# Patient Record
Sex: FEMALE | Race: WHITE | NOT HISPANIC OR LATINO | Employment: STUDENT | URBAN - METROPOLITAN AREA
[De-identification: names, ages, dates, MRNs, and addresses within clinical notes are randomized per-mention and may not be internally consistent; named-entity substitution may affect disease eponyms.]

---
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
## 2022-01-04 ENCOUNTER — CLINICAL SUPPORT (OUTPATIENT)
Dept: REHABILITATION | Facility: OTHER | Age: 22
End: 2022-01-04
Attending: ORTHOPAEDIC SURGERY
Payer: MEDICAID

## 2022-01-04 DIAGNOSIS — R29.898 DECREASED STRENGTH OF TRUNK AND BACK: Primary | ICD-10-CM

## 2022-01-04 PROCEDURE — 97110 THERAPEUTIC EXERCISES: CPT | Mod: CQ

## 2022-01-04 NOTE — PROGRESS NOTES
"[facility]      Name: Angelic Ramos  Clinic Number: [id number]    Therapy Diagnosis:   Encounter Diagnosis   Name Primary?    Decreased strength of trunk and back Yes     Physician: Vicente Min MD    Visit Date: 2022    Physician Orders: PT Eval and Treat   Medical Diagnosis from Referral: Chronic bilateral low back pain without sciatica  Evaluation Date: 2021  Authorization Period Expiration: 2022  Plan of Care Expiration: 2022  Visit # / Visits authorized:     CHANGED IN SYSTEM TO [email or web address].BAN - GOES BY "MIHAELA" AND USES "HE/HIM"     Time In:3:20 PM  Time Out: 4:15 PM  Total Billable Time: 45 minutes     Precautions: Standard     Pattern of pain determined: 1 PEN    Subjective   Mihaela reports that she is feeling OK today but had increased lower back pain yesterday. States that she cannot attribute the increased pain yesterday to anything.    Patient reports tolerating previous visit well with no residual symptoms or issues  Patient reports their pain to be 3/10 on a 0-10 scale with 0 being no pain and 10 being the worst pain imaginable.  Pain Location: Central low back     Work and leisure: Disabled  Pt goals: To be able to stand for longer than 10 minutes without severe pain    Objective     Baseline Isometric Testing on Med X equipment: Testing administered by PT  Date of testin2021  ROM 9-33 deg   Max Peak Torque 102    Min Peak Torque 54    Flex/Ext Ratio 2:1   % below normative data 46     Outcomes: FOTO  Initial score: 56%  Visit 6 score: 40% 22  Goal: <40%    Treatment    Pt was instructed in and performed the following:     Mihaela received therapeutic exercises to develop/improved posture, cardiovascular endurance, muscular endurance, lumbar/cervical ROM, strength and muscular endurance for 45 minutes including the following exercises:     LTR 10x3"  DKTC x10 5" hold  Piriformis stretch 2x30" ea  HSS 3x20" ea  PPT x10  PPT + " "bridge GTB x 15,3"  PPT + BKFO GTB x 10 ea  EIS in comfortable range x 10  90/90 heel taps 10 ea    Not performed 1/4/2022 :  PPT + march x 15 ea    [facility] 1/4/2022   Visit Number 6   VAS Pain Rating 3   Treadmill Time (in min.) 5   Time -   Extension in Standing 10   Flexion in Lying 10   Lumbar Extension Seat Pad -   Femur Restraint -   Top Dead Center -   Counterweight -   Lumbar Flexion -   Lumbar Extension -   Lumbar Peak Torque -   Min Torque -   Test Percent Below Normative Data -   Lumbar Weight 54   Repetitions 20   Rating of Perceived Exertion 3   Ice - Z Lie (in min.) 5       Peripheral muscle strengthening which included 1 set of 15-20 repetitions at a slow, controlled 10-13 second per rep pace focused on strengthening supporting musculature for improved body mechanics and functional mobility.  Pt and therapist focused on proper form during treatment to ensure optimal strengthening of each targeted muscle group.  Machines were utilized including torso rotation, leg extension, leg curl, chest press, upright row. Tricep extension, bicep curl, leg press, and hip abduction added visit 3    Pemberton received the following manual therapy techniques: nil.     Home Exercises Provided and Patient Education Provided   Home exercises include: PPT, LTR, DKTC, SOC  Cardio program: Visit 5 discused possibily swimming or water aerobics for cardio  Lifting education date: Visit 11  Lumbar roll: not yet purchased    Education provided:   - Pacing and technique for initial lumbar MedX strengthening    Written Home Exercises Provided: Patient instructed to cont prior HEP.  Exercises were reviewed and Nilay was able to demonstrate them prior to the end of the session.  Pemberton demonstrated good  understanding of the education provided.     See EMR under Patient Instructions for exercises provided prior visit.    Assessment   Patient presenting with mild lower back symptoms today.  She was progressed to perform " increased resistance to GTB for BKFO and Bridging ex which he was able to perform without c/o and was provided with GTB for home use.  Medx lumbar extension resistance remained at 54 ft/lbs today and she was able to progress to 20 reps with a RPE of 3/10.  Foto completed in clinic today which reflects improvements.    Patient is making good progress towards established goals.  Pt will continue to benefit from skilled outpatient physical therapy to address the deficits stated in the impairment chart, provide pt/family education and to maximize pt's level of independence in the home and community environment.     Anticipated Barriers for therapy: None   Pt's spiritual, cultural and educational needs considered and pt agreeable to plan of care and goals as stated below:     GOALS: Pt is in agreement with the following goals.     Short term goals:  6 weeks or 10 visits   1.  Pt will demonstrate increased lumbar ROM by at least 9 degrees from the initial ROM value with improvements noted in functional ROM and ability to perform ADLs.  (approp and ongoing)  2.  Pt will demonstrate increased MedX average isometric strength value  by 25% from initial test resulting in improved ability to perform bending, lifting, and carrying activities safely, confidently.  (approp and ongoing)  3.  Patient report a reduction in worst pain score by 1-2 points for improved tolerance for standing while doing chores at home.  (approp and ongoing)  4.  Pt able to perform HEP correctly with minimal cueing or supervision from therapist to encourage independent management of symptoms. (approp and ongoing)      Long term goals: 10 weeks or 20 visits   1. Pt will demonstrate increased lumbar ROM by at least 18 degrees from initial ROM value, resulting in improved ability to perform functional fwd bending while standing and sitting. (approp and ongoing)  2. Pt will demonstrate increased MedX average isometric strength value  by 50% from initial test  resulting in improved ability to perform bending, lifting, and carrying activities safely, confidently.  (approp and ongoing)  3. Pt to demonstrate ability to independently control and reduce their pain through posture positioning and mechanical movements throughout a typical day.  (approp and ongoing)  4.  Pt will demonstrate reduced pain and improved functional outcomes as reported on the FOTO by reaching a limitation score of < or = 40% or less in order to demonstrate subjective improvement in pt's condition.    (approp and ongoing)  5. Pt will demonstrate independence with the HEP at discharge  (approp and ongoing)  6.  Patient will report being able to walk for up to an hour with no greater than 3/10 low back pain  (approp and ongoing)        Plan   Continue with established Plan of Care towards established PT goals.     Leodan Williamson, PTA  1/4/2022

## 2022-01-11 ENCOUNTER — CLINICAL SUPPORT (OUTPATIENT)
Dept: REHABILITATION | Facility: OTHER | Age: 22
End: 2022-01-11
Attending: ORTHOPAEDIC SURGERY
Payer: MEDICAID

## 2022-01-11 DIAGNOSIS — R29.898 DECREASED STRENGTH OF TRUNK AND BACK: Primary | ICD-10-CM

## 2022-01-11 PROCEDURE — 97110 THERAPEUTIC EXERCISES: CPT | Mod: CQ

## 2022-01-11 NOTE — PROGRESS NOTES
"[facility]      Name: Angelic Ramos  Clinic Number: [id number]    Therapy Diagnosis:   Encounter Diagnosis   Name Primary?    Decreased strength of trunk and back Yes     Physician: Vicente Min MD    Visit Date: 2022    Physician Orders: PT Eval and Treat   Medical Diagnosis from Referral: Chronic bilateral low back pain without sciatica  Evaluation Date: 2021  Authorization Period Expiration: 2022  Plan of Care Expiration: 2022  Visit # / Visits authorized:  (pending)    CHANGED IN SYSTEM TO [email or web address].BAN - GOES BY "MIHAELA" AND USES "HE/HIM"     Time In: 3:10 PM  Time Out: 4:00 PM  Total Billable Time: 45 minutes     Precautions: Standard     Pattern of pain determined: 1 PEN    Subjective   Pemberton reports minimal lower back pain at the moment but states he experienced increased pain levels he rated as high as 7/10.  Rest and attempting HEP helped relieve his pain    Patient reports tolerating previous visit well with no residual symptoms or issues  Patient reports their pain to be 2/10 on a 0-10 scale with 0 being no pain and 10 being the worst pain imaginable.  Pain Location: Central low back     Work and leisure: Disabled  Pt goals: To be able to stand for longer than 10 minutes without severe pain    Objective     Baseline Isometric Testing on Med X equipment: Testing administered by PT  Date of testin2021  ROM 9-33 deg   Max Peak Torque 102    Min Peak Torque 54    Flex/Ext Ratio 2:1   % below normative data 46     Outcomes: FOTO  Initial score: 56%  Visit 6 score: 40% 22  Goal: <40%    Treatment    Pt was instructed in and performed the following:     Mihaela received therapeutic exercises to develop/improved posture, cardiovascular endurance, muscular endurance, lumbar/cervical ROM, strength and muscular endurance for 45 minutes including the following exercises:     LTR 10x3"  DKTC x10 5" hold  Piriformis stretch 2x30" ea  HSS " "3x20" ea  PPT + bridge GTB x 15,3"  PPT + BKFO GTB x 10 ea  EIS in comfortable range x 10  90/90 heel taps 10 ea    Not performed 1/11/2022 :  PPT + march x 15 ea    [facility] - Short 1/11/2022   Visit Number 7   VAS Pain Rating 2   Treadmill Time (in min.) -   Time 5   Extension in Standing 10   Flexion in Lying 10   Lumbar Extension - Seat Pad -   Femur Restraint -   Top Dead Center -   Counterweight -   Lumbar Flexion -   Lumbar Extension -   Lumbar Peak Torque -   Lumbar Weight 58   Repetitions 15   Rating of Perceived Exertion 4       Peripheral muscle strengthening which included 1 set of 15-20 repetitions at a slow, controlled 10-13 second per rep pace focused on strengthening supporting musculature for improved body mechanics and functional mobility.  Pt and therapist focused on proper form during treatment to ensure optimal strengthening of each targeted muscle group.  Machines were utilized including torso rotation, leg extension, leg curl, chest press, upright row. Tricep extension, bicep curl, leg press, and hip abduction added visit 3    Nilay received the following manual therapy techniques: nil.     Home Exercises Provided and Patient Education Provided   Home exercises include: PPT, LTR, DKTC, SOC  Cardio program: Visit 5 discused possibily swimming or water aerobics for cardio  Lifting education date: Visit 11  Lumbar roll: not yet purchased    Education provided:   - Pacing and technique for initial lumbar MedX strengthening    Written Home Exercises Provided: Patient instructed to cont prior HEP.  Exercises were reviewed and Nilay was able to demonstrate them prior to the end of the session.  Nilay demonstrated good  understanding of the education provided.     See EMR under Patient Instructions for exercises provided prior visit.    Assessment   Nilay returned reporting minimal lower back pain today.  Treatment continued to emphasize lumbopelvic mobility/flexibility and core and hip " strengthening.  Verbal cues needed to keep stretches within comfortable range and not cause increase in pain.  Medx resistance increased to 58#.  He completed 15 reps at a RPE of 4/10.    Patient is making good progress towards established goals.  Pt will continue to benefit from skilled outpatient physical therapy to address the deficits stated in the impairment chart, provide pt/family education and to maximize pt's level of independence in the home and community environment.     Anticipated Barriers for therapy: None   Pt's spiritual, cultural and educational needs considered and pt agreeable to plan of care and goals as stated below:     GOALS: Pt is in agreement with the following goals.     Short term goals:  6 weeks or 10 visits   1.  Pt will demonstrate increased lumbar ROM by at least 9 degrees from the initial ROM value with improvements noted in functional ROM and ability to perform ADLs.  (approp and ongoing)  2.  Pt will demonstrate increased MedX average isometric strength value  by 25% from initial test resulting in improved ability to perform bending, lifting, and carrying activities safely, confidently.  (approp and ongoing)  3.  Patient report a reduction in worst pain score by 1-2 points for improved tolerance for standing while doing chores at home.  (approp and ongoing)  4.  Pt able to perform HEP correctly with minimal cueing or supervision from therapist to encourage independent management of symptoms. (approp and ongoing)      Long term goals: 10 weeks or 20 visits   1. Pt will demonstrate increased lumbar ROM by at least 18 degrees from initial ROM value, resulting in improved ability to perform functional fwd bending while standing and sitting. (approp and ongoing)  2. Pt will demonstrate increased MedX average isometric strength value  by 50% from initial test resulting in improved ability to perform bending, lifting, and carrying activities safely, confidently.  (approp and ongoing)  3.  Pt to demonstrate ability to independently control and reduce their pain through posture positioning and mechanical movements throughout a typical day.  (approp and ongoing)  4.  Pt will demonstrate reduced pain and improved functional outcomes as reported on the FOTO by reaching a limitation score of < or = 40% or less in order to demonstrate subjective improvement in pt's condition.    (approp and ongoing)  5. Pt will demonstrate independence with the HEP at discharge  (approp and ongoing)  6.  Patient will report being able to walk for up to an hour with no greater than 3/10 low back pain  (approp and ongoing)        Plan   Continue with established Plan of Care towards established PT goals.     Jose C Echeverria, PTA  1/11/2022

## 2022-03-24 ENCOUNTER — HOSPITAL ENCOUNTER (EMERGENCY)
Facility: OTHER | Age: 22
Discharge: HOME | End: 2022-03-24
Attending: EMERGENCY MEDICINE
Payer: MEDICAID

## 2022-03-24 VITALS
SYSTOLIC BLOOD PRESSURE: 120 MMHG | HEIGHT: 66 IN | HEART RATE: 92 BPM | OXYGEN SATURATION: 97 % | RESPIRATION RATE: 18 BRPM | BODY MASS INDEX: 47.09 KG/M2 | DIASTOLIC BLOOD PRESSURE: 76 MMHG | WEIGHT: 293 LBS | TEMPERATURE: 98 F

## 2022-03-24 DIAGNOSIS — R56.9 SEIZURE-LIKE ACTIVITY: Primary | ICD-10-CM

## 2022-03-24 DIAGNOSIS — R56.9 SEIZURE: ICD-10-CM

## 2022-03-24 LAB
B-HCG UR QL: NEGATIVE
CREAT SERPL-MCNC: 0.8 MG/DL (ref 0.5–1.4)
CTP QC/QA: YES
HCT VFR BLD CALC: 41 %PCV (ref 36–54)
HGB BLD-MCNC: 14 G/DL
POC IONIZED CALCIUM: 1.3 MMOL/L (ref 1.06–1.42)
POCT GLUCOSE: 101 MG/DL (ref 70–110)
POTASSIUM BLD-SCNC: 4.7 MMOL/L (ref 3.5–5.1)
SAMPLE: NORMAL
SAMPLE: NORMAL
SODIUM BLD-SCNC: 141 MMOL/L (ref 136–145)

## 2022-03-24 PROCEDURE — 99284 EMERGENCY DEPT VISIT MOD MDM: CPT | Mod: 25

## 2022-03-24 PROCEDURE — 82565 ASSAY OF CREATININE: CPT

## 2022-03-24 PROCEDURE — 99900035 HC TECH TIME PER 15 MIN (STAT)

## 2022-03-24 PROCEDURE — 82330 ASSAY OF CALCIUM: CPT

## 2022-03-24 PROCEDURE — 85014 HEMATOCRIT: CPT

## 2022-03-24 PROCEDURE — 84132 ASSAY OF SERUM POTASSIUM: CPT

## 2022-03-24 PROCEDURE — 81025 URINE PREGNANCY TEST: CPT | Performed by: EMERGENCY MEDICINE

## 2022-03-24 PROCEDURE — 82962 GLUCOSE BLOOD TEST: CPT

## 2022-03-24 PROCEDURE — 84295 ASSAY OF SERUM SODIUM: CPT

## 2022-03-24 RX ORDER — DULOXETIN HYDROCHLORIDE 60 MG/1
60 CAPSULE, DELAYED RELEASE ORAL DAILY
COMMUNITY
Start: 2022-03-07 | End: 2022-05-01

## 2022-03-24 RX ORDER — LAMOTRIGINE 300 MG/1
150 TABLET, EXTENDED RELEASE ORAL NIGHTLY
COMMUNITY
Start: 2022-03-07

## 2022-03-24 RX ORDER — EMTRICITABINE AND TENOFOVIR DISOPROXIL FUMARATE 200; 300 MG/1; MG/1
1 TABLET, FILM COATED ORAL DAILY
COMMUNITY
Start: 2022-02-11 | End: 2023-01-02

## 2022-03-24 RX ORDER — BACLOFEN 20 MG/1
20 TABLET ORAL 3 TIMES DAILY
COMMUNITY
Start: 2022-03-07 | End: 2022-09-18 | Stop reason: CLARIF

## 2022-03-24 NOTE — DISCHARGE INSTRUCTIONS
Referral for Neurology follow-up has been placed and someone should be contacting you in the next couple of days to  schedule an appointment.    Also follow up with [facility] physicians to help expedite follow-up.

## 2022-03-24 NOTE — ED PROVIDER NOTES
Source of History:  The patient    Chief complaint:  Seizures (Pt advised while she was dying her hair last date 3/23/2022 her significant other told her she had a seizure - pt's girlfriend told her she blacked out and had what appeared to be a tonic clonic seizure - pt has no recollection of the event - pt has never had a seizure before - pt wants to get evaluated today for the possible seizure)      HPI:  Angelic Ramos is a 21 y.o. female presenting with complaint of seizure-like activity that occurred last night.  She was sitting on the floor dying her hair.  She then stated she became nauseous and felt like she was going to pass it.  Her roommate was there and stated that she had rigid shaking that lasted about 1 minute.  She stopped and then had a moment of confusion before she came around.  Patient does not remember the event.  She has never had a seizure in the past.  She uses Lamictal as well as Cymbalta for mood stabilization but has not had any recent changes in her medications.  She states she does take her medications irregularly.    She also be smokes marijuana and uses can avoid or oils but has been using the same batch about 3 weeks with no issues.    She does state currently she has a mild headache that is 2/10.  States is typical of her regular headaches.    This is the extent to the patients complaints today here in the emergency department.    ROS: As per HPI and below:  Constitutional: No fever.  No chills.  Eyes: No visual changes.  ENT: No sore throat. No ear pain    Cardiovascular: No chest pain.  Respiratory: No shortness of breath.  GI: No abdominal pain.  No nausea or vomiting.  Genitourinary: No abnormal urination.  Neurologic: No headache. No focal weakness.  No numbness.  Positive for seizure  MSK: no back pain.  Integument: No rashes or lesions.  Hematologic: No easy bruising.  Endocrine: No excessive thirst or urination.    Review of patient's allergies indicates:   Allergen  "Reactions    Lactose Other (See Comments)       PMH:  As per HPI and below:  Past Medical History:   Diagnosis Date    Depression     Scoliosis     Spondylolysis     Urinary tract infection      Past Surgical History:   Procedure Laterality Date    BACK SURGERY      TRANSFORAMINAL LUMBAR INTERBODY FUSION  7/14/2020    Procedure: FUSION, SPINE, LUMBAR, TLIF;  Surgeon: Vicente Min MD;  Location: [facility] OR [address] [place] [address];  Service: Orthopedics;;  L4-5    WISDOM TOOTH EXTRACTION         Social History     Tobacco Use    Smoking status: Never Smoker    Smokeless tobacco: Never Used    Tobacco comment: vapes with CBD   Substance Use Topics    Alcohol use: No    Drug use: Yes     Frequency: 3.0 times per week     Types: Marijuana       Physical Exam:    /76 (BP Location: Right arm, Patient Position: Sitting)   Pulse 92   Temp 98.3 °F (36.8 °C) (Oral)   Resp 18   Ht 5' 6" (1.676 m)   Wt (!) 140.6 kg (310 lb)   SpO2 97%   BMI 50.04 kg/m²   Nursing note and vital signs reviewed.  Constitutional: No acute distress.  Nontoxic  Eyes: No conjunctival injection.Extraocular muscles are intact.  ENT: Oropharynx clear.  Normal phonation.   Cardiovascular: Regular rate and rhythm.  No murmurs. No gallops. No rubs  Respiratory: Clear to auscultation bilaterally.  Good air movement.  No wheezes.  No rhonchi. No rales. No accessory muscle use..  Abdomen: Soft.  Not distended.  Nontender.  No guarding.  No rebound. Non-peritoneal.  Musculoskeletal: Good range of motion all joints.  No deformities.  Neck supple.  No meningismus.  Skin: No rashes seen.  Good turgor.  No abrasions.  No ecchymoses.  Neurologic: Motor intact.  Sensation intact.  No ataxia. No focal neurological deficits.  Psych: Appropriate, conversant    Labs that have been ordered have been independently reviewed and interpreted by myself.    I decided to obtain the patient's medical records.  Summary of Medical Records:      MDM/ Differential Dx:  "   21 y.o. female with symptoms suggestive of new onset seizure.  Will get electrolytes check pregnancy test.  Will also get a CT the brain.  Low suspicion for tumor intracranial hemorrhage based on history as well as physical exam.  Could be secondary to her medication or drugs.  Patient is also currently taking testosterone for the last few months for female to male.    ED Course as of 03/24/22 1327   Thu Mar 24, 2022   1314 CT Head Without Contrast  Independently reviewed by myself showing no abnormality. [SM]   1316 POCT Glucose: 101 [SM]   1316 POC Creatinine: 0.8 [SM]   1316 POC Sodium: 141 [SM]   1316 POC Potassium: 4.7 [SM]   1316 Preg Test, Ur: Negative [SM]   1326 CT brain shows no acute abnormality.  No further workup indicated here in the emergency department.  Will discharge with referral to Neurology as an outpatient have a follow-up with primary care as an outpatient.    Patient to be discharged home in stable condition. Diagnosis and treatment plan explained to patient and/or family member who is at bedside. I have answered all questions and the patient is satisfied with the plan of care. Strict return precautions given. The patient demonstrates understanding of the care plan. [SM]      ED Course User Index  [SM] Malachi Gtz DO                 Diagnostic Impression:    1. Seizure-like activity    2. Seizure         ED Disposition Condition    Discharge Stable          ED Prescriptions     None        Follow-up Information     Follow up With Specialties Details Why Contact Info    Billie Khan MD Pediatrics Schedule an appointment as soon as possible for a visit   [address] [facility]  [place] [address]  [phone]             Malachi Gtz DO  03/24/22 3138

## 2022-03-24 NOTE — ED NOTES
HPI:  Patient explained that while getting his hair done his partner observed the patient displaying signs of seizure activity by shaking and being unresponsive. Patient does not remember this event taking place. Patient denies any past medical history of seizures or syncopal episodes.

## 2022-04-26 ENCOUNTER — DOCUMENTATION ONLY (OUTPATIENT)
Dept: REHABILITATION | Facility: OTHER | Age: 22
End: 2022-04-26
Payer: MEDICAID

## 2022-04-26 NOTE — PROGRESS NOTES
Patient attended 7 visits of [facility] program with 5 cancellations. After last two consecutive cancellations, patient did not contact clinic to schedule further or respond to calls to schedule, and is therefore discharged from the program at this time.    Wiliam Kelly PT, DPT

## 2022-04-27 NOTE — PROGRESS NOTES
Nilay is here for a follow up for spondy and L4-5 fusion and TLIF.  Started the healthy back program but due to mental health issues/admissions, was nocompliant and dismissed.  Was placed on baclofen recently that has helped the pain considerably.    No outpatient medications have been marked as taking for the 4/28/22 encounter (Appointment) with Vicente Min MD.       Review of Symptoms: No fevers or neuro changess  Active Ambulatory Problems     Diagnosis Date Noted    Overactive bladder 04/05/2013    Incomplete bladder emptying 04/05/2013    Constipation - functional 05/13/2013    Overweight 08/13/2013    Family history of hearing loss 06/22/2015    Family history of hypothyroidism 06/22/2015    Dandruff 09/14/2015    Vasovagal syncope 08/13/2019    Family history of early death 08/13/2019    Chronic pain of both knees 08/13/2019    Patellofemoral pain syndrome of both knees 08/13/2019    Spondylolisthesis at L4-L5 level 10/04/2019    Spondylolysis of lumbar region 10/04/2019    Chronic low back pain 10/18/2019    Spondylisthesis 07/14/2020    Severe episode of recurrent major depressive disorder, without psychotic features 09/02/2020    Gastroesophageal reflux disease without esophagitis 09/02/2020    Pain 02/02/2021    Seasonal allergic rhinitis 02/20/2021    Decreased strength of trunk and back 12/09/2021     Resolved Ambulatory Problems     Diagnosis Date Noted    Decreased strength of trunk and back 10/18/2019    Encounter for medical screening examination 09/02/2020    Depression with suicidal ideation 09/19/2020    Depression with suicidal ideation 02/19/2021     Past Medical History:   Diagnosis Date    Depression     Scoliosis     Spondylolysis     Urinary tract infection        Physical Exam    Patient alert and oriented  Obese  All extremities pink and warm with good cap refill and no edema.   Gait normal.    Motor exam  And dtr  lower extremities intact  Back shows full  rom.  Rotation and deformity Obese, no real deformity    Xrays  Xrays were done today  and by my reading, L4-5 fusion stable without complication.       Impresion   spondy looks good post fusion  Chronic low back pain.     Plan   Overall back pain is better.  Is dealing with Borderline vs Bipolar issues as well as some family issues.   Re-enroll healthy back.   Will come back to us as needed or if not improving.   Greater then 30 minutes spent on this case including time with patient, chart and xray review, discussion and charting.

## 2022-04-28 ENCOUNTER — OFFICE VISIT (OUTPATIENT)
Dept: ORTHOPEDICS | Facility: CLINIC | Age: 22
End: 2022-04-28
Payer: MEDICAID

## 2022-04-28 VITALS — WEIGHT: 293 LBS | HEIGHT: 65 IN | BODY MASS INDEX: 48.82 KG/M2

## 2022-04-28 DIAGNOSIS — G89.29 CHRONIC BILATERAL LOW BACK PAIN WITHOUT SCIATICA: ICD-10-CM

## 2022-04-28 DIAGNOSIS — M43.16 SPONDYLOLISTHESIS AT L4-L5 LEVEL: Primary | ICD-10-CM

## 2022-04-28 DIAGNOSIS — M54.50 CHRONIC BILATERAL LOW BACK PAIN WITHOUT SCIATICA: ICD-10-CM

## 2022-04-28 PROCEDURE — 99999 PR PBB SHADOW E&M-EST. PATIENT-LVL III: CPT | Mod: PBBFAC,,, | Performed by: ORTHOPAEDIC SURGERY

## 2022-04-28 PROCEDURE — 99214 PR OFFICE/OUTPT VISIT, EST, LEVL IV, 30-39 MIN: ICD-10-PCS | Mod: S$PBB,,, | Performed by: ORTHOPAEDIC SURGERY

## 2022-04-28 PROCEDURE — 1159F PR MEDICATION LIST DOCUMENTED IN MEDICAL RECORD: ICD-10-PCS | Mod: CPTII,,, | Performed by: ORTHOPAEDIC SURGERY

## 2022-04-28 PROCEDURE — 1159F MED LIST DOCD IN RCRD: CPT | Mod: CPTII,,, | Performed by: ORTHOPAEDIC SURGERY

## 2022-04-28 PROCEDURE — 99213 OFFICE O/P EST LOW 20 MIN: CPT | Mod: PBBFAC | Performed by: ORTHOPAEDIC SURGERY

## 2022-04-28 PROCEDURE — 3008F PR BODY MASS INDEX (BMI) DOCUMENTED: ICD-10-PCS | Mod: CPTII,,, | Performed by: ORTHOPAEDIC SURGERY

## 2022-04-28 PROCEDURE — 3008F BODY MASS INDEX DOCD: CPT | Mod: CPTII,,, | Performed by: ORTHOPAEDIC SURGERY

## 2022-04-28 PROCEDURE — 99999 PR PBB SHADOW E&M-EST. PATIENT-LVL III: ICD-10-PCS | Mod: PBBFAC,,, | Performed by: ORTHOPAEDIC SURGERY

## 2022-04-28 PROCEDURE — 99214 OFFICE O/P EST MOD 30 MIN: CPT | Mod: S$PBB,,, | Performed by: ORTHOPAEDIC SURGERY

## 2022-05-10 ENCOUNTER — CLINICAL SUPPORT (OUTPATIENT)
Dept: REHABILITATION | Facility: OTHER | Age: 22
End: 2022-05-10
Attending: ORTHOPAEDIC SURGERY
Payer: MEDICAID

## 2022-05-10 DIAGNOSIS — M53.86 DECREASED ROM OF LUMBAR SPINE: ICD-10-CM

## 2022-05-10 DIAGNOSIS — R29.898 WEAKNESS OF BOTH LOWER EXTREMITIES: ICD-10-CM

## 2022-05-10 DIAGNOSIS — Z74.09 IMPAIRED FUNCTIONAL MOBILITY, BALANCE, GAIT, AND ENDURANCE: ICD-10-CM

## 2022-05-10 DIAGNOSIS — R29.898 DECREASED STRENGTH OF TRUNK AND BACK: ICD-10-CM

## 2022-05-10 DIAGNOSIS — M43.16 SPONDYLOLISTHESIS AT L4-L5 LEVEL: ICD-10-CM

## 2022-05-10 DIAGNOSIS — M54.50 CHRONIC BILATERAL LOW BACK PAIN WITHOUT SCIATICA: ICD-10-CM

## 2022-05-10 DIAGNOSIS — G89.29 CHRONIC BILATERAL LOW BACK PAIN WITHOUT SCIATICA: ICD-10-CM

## 2022-05-10 PROCEDURE — 97110 THERAPEUTIC EXERCISES: CPT

## 2022-05-10 PROCEDURE — 97161 PT EVAL LOW COMPLEX 20 MIN: CPT

## 2022-05-10 NOTE — PATIENT INSTRUCTIONS
WALKING PROGRAM  Walking is one of the easiest forms of exercise.  All you need is a good pair of shoes, comfortable clothing, and desire.   Walking on a regular basis has been shown to have many healthy benefits:   Decrease in pain  Improved mood & overall sense of well-being: decrease in depression & anxiety  Improved sleep  Decrease stress levels. Decreased cortisol levels.  Improved blood pressure and heart strength  Improved fitness and musculature strength  GETTING STARTED:    Goal is to walk for at least 20-30 mins every day of the week  Tips:  Watch your posture.  Walk tall.  Pretend you are being pulled up by a rope attached to the crown of your head.  Your shoulders should be down, back and relaxed. Feel the natural swing of your arms in opposition to your leg swing.  Land on your heel and roll off on your toes.   Be sure to drink plenty of water before, during, and after walking.   Incorporate a warm up and cool down into your routine.  Start your walk at a slow warm up pace, then walk desired length of time.  End your walk with slower cool down.  Any stretches that your therapist has recommended for you may be done before and after your walk to prevent injury.  PROGRESSING:  GOAL: 30-45 minutes, 5 days a week   Increase your walking time by 2-3 minutes. Do this for several days until it gets easy. Then increase your time again until you have reached your maximum desired time.     Once you have achieved your time goal, you can further progress your program by increasing the speed at which you walk.    Make it a Habit:   Things to help with motivation to continue your walking program:   Get a walking partner  Listen to favorite music  SET GOALS & reward yourself for achieving them  Make it fun & remember all of the health benefits of simply walking  Record your progress  WEEK MON TUES WED THUR FRI SAT SUN   Week 1  Date:______  Goal:______          Week  2  Date:______  Goal:______          Week 3  Date:______  Goal:______          Week 4  Date:______  Goal:______          Week 5  Date:______  Goal:______          Week 6  Date:______  Goal:______          Week 7  Date:______  Goal:______          Week 8  Date:______  Goal:______          Week 9  Date:______  Goal:______          Week 10  Date:______  Goal:______          Week 11  Date:______  Goal:______

## 2022-05-10 NOTE — PLAN OF CARE
"  [facility] - PHYSICAL THERAPY EVALUATION     Name: Angelic Ramos  Clinic Number: [id number]    Therapy Diagnosis:   Encounter Diagnoses   Name Primary?    Chronic bilateral low back pain without sciatica     Spondylolisthesis at L4-L5 level     Decreased ROM of lumbar spine     Decreased strength of trunk and back     Impaired functional mobility, balance, gait, and endurance     Weakness of both lower extremities      Physician: Vicente Min MD    Physician Orders: PT Eval and Treat   Note: Patient was admitted for mental health issues and did not follow up.  He is doing much better and is excited for the chance to re-enroll.    Medical Diagnosis from Referral:   M54.50,G89.29 (ICD-10-CM) - Chronic bilateral low back pain without sciatica  M43.16 (ICD-10-CM) - Spondylolisthesis at L4-L5 level    Evaluation Date: 5/10/2022  Authorization Period Expiration: 04/28/2023  Plan of Care Expiration: 07/19/22 (10 weeks)  Reassessment Due: 06/10/22  Visit # / Visits authorized: 1/ 1 (Second time around with HB, incomplete 1st episode).     CHANGED IN SYSTEM TO [email or web address] - GOES BY "MIHAELA" AND USES "HE/HIM"    Time In: 0900  Time Out: 1010  Total Billable Time: 70 minutes    Precautions: Standard s/p spondy and L4-5 fusion and TLIF (7/14/2020); Hx of depression; B knee pain; Psych. Reported seizure 2 months ago.     Pattern of pain determined: 1 PEN    Subjective     Date of onset: July 2020    History of current condition - Mihaela reports "my back pain has been going up and down recently; overall it has been worst than when I started". "I started the program and life happened; I dropped the program, but I am back so is my back pain". The pain is is across the lower back and radiates to the hips; no radiating symptoms to the legs. He denies any falls in the past year. He is having difficulties with standing for long period, dishes, loading . He takes baclofen 20mg for 2x/day; it is helps with " "overall pain at rest; but not with physical and dynamic activities. He also takes 800mg Ibuprofen+1000m og Tylenol. "The doctors are aware that I am taking that". He also does Marijuana for extreme pain and it helps a lot. Ice pack helps. "The stretches make me hurt a bit more sometimes".      Medical History:   Past Medical History:   Diagnosis Date    Depression     Scoliosis     Spondylolysis     Urinary tract infection        Surgical History:   Angelic BUSBY Workman  has a past surgical history that includes [place] tooth extraction; Transforaminal lumbar interbody fusion (7/14/2020); and Back surgery.    Medications:   Angelic has a current medication list which includes the following prescription(s): baclofen, emtricitabine-tenofovir 200-300 mg, ibuprofen, lamotrigine, naproxen, needle (disp) 25 gauge, omeprazole, syringe (disposable), and testosterone cypionate.    Allergies:   Review of patient's allergies indicates:   Allergen Reactions    Lactose Other (See Comments)        Imaging: see Epic    Prior Therapy:  PT HB program incomplete; helped with flexibility and moving longer distance; pain was still there but more manageable.   Prior Treatment: s/p Lumbar fusion; no other tx.  Social History:  lives with their family; parents  Occupation: Unemployed  Leisure: Petra; be with friends      Prior Level of Function: Unlimited  Current Level of Function: "It is pretty bad; I cannot walk more than a mile due to pain and fatigue".   DME owned/used: NA    Pain:  Current 3/10, worst 10/10, best 0/10   Location: Across the lower back and to the hips  Description: Achy usually; in worst it feels like stabbing.  Aggravating Factors: Prolonged standing/walking.  Easing Factors: pain medication, ice, heating pad and rest  Disturbed Sleep: Occasionally     Pattern of pain questions:  1.  Where is your pain the worst? See above  2.  Is your pain constant or intermittent? Constant mostly  3.  Does bending forward make " "your typical pain worse? No  4.  Since the start of your back pain, has there been a change in your bowel or bladder? No  5.  What can't you do now that you use to be able to do? "Standing longer than 30 minutes"      Pts goals: "Being able to walker long and tolerate standing longer".     Red Flag Screening:   Cough  Sneeze  Strain: (--)  Bladder/ bowel: (--)  Falls: (--)  Night pain: (--)  Unexplained weight loss: (--)  General health: "Fair, it is in the rough spot".     OBJECTIVE     Postural examination/scapula alignment: Rounded shoulder, Head forward and Slouched posture  Sitting: Fair  Standing: Fair  Correction of posture: no effect with lumbar roll    MOVEMENT LOSS    ROM Loss   Flexion within functional limits   Extension (painful) minimal loss   Side glide Right within functional limits   Side glide Left within functional limits   Rotation Right within functional limits   Rotation Left within functional limits     Lower Extremity Strength  Right LE  Left LE    Hip flexion: 4/5 Hip flexion: 4/5   Hip extension:  4-/5 Hip extension: 4-/5   Hip abduction: 4-/5 Hip abduction: 4-/5   Hip adduction:  4/5 Hip adduction:  4/5   Hip External Rotation 4-/5 Hip External Rotation 4-/5   Hip Internal rotation   4/5 Hip Internal rotation 4/5   Knee Flexion 4+/5 Knee Flexion 4+/5   Knee Extension 5/5 Knee Extension 5/5   Ankle dorsiflexion: 4+/5 Ankle dorsiflexion: 4+/5   Ankle plantarflexion: 5/5 Ankle plantarflexion: 5/5       GAIT:  Assistive Device used: none  Level of Assistance: independent  Patient displays the following gait deviations: No noticeable deviations at this time, but Hx of Knee pain.     Special Tests:   Test Name  Test Result   Prone Instability Test (--)   SI Joint Provocation Test (--)   Straight Leg Raise (--)   Neural Tension Test (--)   Crossed Straight Leg Raise (--)   Walking on toes (--)   Walking on heels  (--)       NEUROLOGICAL SCREENING     Sensory deficit: WNL B LE's    Reflexes: Did " "not test today    REPEATED TEST MOVEMENTS:    Baseline symptoms:  Repeated Flexion in Standing no effect; no worse   Repeated Extension in Standing pain during motion  no worse   Repeated Flexion in lying no worse   Repeated Extension in lying  pain during motion  a little worse     Prone lying: Comfortable.   SULLY: Comfortable    Baseline Isometric Testing on Med X equipment: Testing administered by PT  Date of testin/10/22   ROM 0-36 deg   Max Peak Torque 174    Min Peak Torque 45    Flex/Ext Ratio 3.86:1   % below normative data 18     Recommended starting weight: 50-60#    Limitation/Restriction for FOTO Lumbar  Survey    Therapist reviewed FOTO scores for Angelic Callman on 5/10/2022.   FOTO documents entered into [employer] - see Media section.                   Treatment     Total Treatment time separate from Evaluation: 40 minutes      Angelic received therapeutic exercises to develop/improve posture, lumbar/cervical ROM, strength and muscular endurance for 30 minutes including the following exercises:     Med x dynamic exercise and baseline IM test  [facility] 5/10/2022   Visit Number 1   VAS Pain Rating 3   Lumbar Stretches - Slouch Overcorrection 10   Extension in Standing 10   Flexion in Lying 10   Lumbar Extension Seat Pad 0   Femur Restraint 7   Top Dead Center 24   Counterweight 435   Lumbar Flexion 36   Lumbar Extension 0   Lumbar Peak Torque 174   Min Torque 45   Test Percent Below Normative Data 18   Lumbar Weight 45   Ice - Z Lie (in min.) 10       DKTC 10x5"  [id number]"  PPT 10x5"  SOC    Piriformis stretch 3x20" ea  HSS 3x20" ea  PPT + bridge GTB x 10,3"  PPT + BKFO GTB x 10 ea  EIS in comfortable range x 10  90/90 heel taps 10 ea      Written Home Exercises Provided: Patient instructed to cont prior HEP.  Exercises were reviewed and Nilay was able to demonstrate them prior to the end of the session.  Nilay demonstrated good  understanding of the education provided.     See EMR under " Patient Instructions for exercises provided prior visit.      Education provided:   - Patient received education regarding proper posture and body mechanics.  Patient was given top [facility] Visit 1 handouts which discuss what to expect in therapy, the purpose and opportunity for health coaching, the program,  wellness when discharged from therapy, back education and care specifically for posture seated, standing, lifting correctly, components of exercise, importance of nutrition and hydration, and importance of sleep.   Information on lumbar rolls provided.  - Marycarmen roll tried, recommended, and purchase information was provided.    - Patient received a handout regarding anticipated muscular soreness following the isometric test and strategies for management were reviewed with patient including stretching, using ice and scheduled rest.   - Patient received education on the [facility] program, purpose of the isometric test, progression of back strengthening as well as wellness approach and systemic strengthening.  Details of the program were discussed.  Reviewed that patient should feel support/pressure from med ex restraints but no pain or discomfort and patient expressed understanding.  -Walking program education with pacing for tolerance.    Pemberton received cold pack for 10 minutes to lower back in z-lie.    Assessment   Angelic is a 22 y.o. female referred to [facility] with  medical diagnoses of Chronic bilateral low back pain without sciatica; and Spondylolisthesis at L4-L5 level. Pt presents with post op status of spondy and L4-5 fusion and TLIF (7/14/2020). Pain is back dominant at this time. Patient started the [facility] Program but did not complete (last visit date was on 01/11/22). He returned to  today motivated to take charge of his pain and life for a better lifestyle. Evaluation revealed Decreased ROM of lumbar spine; Decreased strength of trunk and back; Impaired  functional mobility, balance, gait, and endurance; and Weakness of both lower extremities. FOTO survey for lumbar spine revealed moderate impairment with function, with 56% limitation score. Lumbar Extensors IM strength  Testing on medx revealed an average of 18% strength deficits, 54% at 0 deg angle. Patient was re-educated on HEP, benefits of HB program and availability of Health  to improve QOL and lifestyle. POC will follow the HB rehab protocol.        Pain Pattern: 1 PEN       Pt prognosis is Good.   Pt will benefit from skilled outpatient Physical Therapy to address the deficits stated above and in the chart below, provide pt/family education, and to maximize pt's level of independence. Based on the above history and physical examination an active physical therapy program is recommended.  Pt will continue to benefit from skilled outpatient physical therapy to address the deficits listed below in the chart, provide pt/family education and to maximize pt's level of independence in the home and community environment. .       Plan of care discussed with patient: Yes  Pt's spiritual, cultural and educational needs considered and patient is agreeable to the plan of care and goals as stated below:     Anticipated Barriers for therapy: Adherence and compliance to program. Obesity.    PT Evaluation Completed? Yes    Medical necessity is demonstrated by the following problem list.    Pt presents with the following impairments:     History  Co-morbidities and personal factors that may impact the plan of care Co-morbidities:   s/p spondy and L4-5 fusion and TLIF (7/14/2020); Hx of depression; B knee pain; Psych. Reported seizure 2 months ago.  Obesity.    Personal Factors:   age  coping style  lifestyle     moderate   Examination  Body Structures and Functions, activity limitations and participation restrictions that may impact the plan of care Body Regions:   back  lower extremities  trunk    Body Systems:     ROM  strength  balance  gait  transfers  transitions    Participation Restrictions:   Per tolerance    Activity limitations:   Learning and applying knowledge  no deficits    General Tasks and Commands  no deficits    Communication  no deficits    Mobility  prolonged standing and walking tasks/activities    Self care  no deficits    Domestic Life  shopping  cooking  doing house work (cleaning house, washing dishes, laundry)  assisting others  Standing in line at groceries or events.    Interactions/Relationships  Gender transitioning.     Life Areas  employment  seeking disability    Community and Social Life  community life  recreation and leisure         low   Clinical Presentation stable and uncomplicated low   Decision Making/ Complexity Score: low       GOALS: Pt is in agreement with the following goals.    Short term goals:  6 weeks or 10 visits   1.  Pt will demonstrate increased lumbar ROM by at least 3 degrees from the initial ROM value with improvements noted in functional ROM and ability to perform ADLs.  (approp and ongoing)  2.  Pt will demonstrate increased MedX average isometric strength value  by 10% from initial test resulting in improved ability to perform bending, lifting, and carrying activities safely, confidently.  (approp and ongoing)  3.  Patient report a reduction in worst pain score by 1-2 points for improved tolerance for functional activities.  (approp and ongoing)  4.  Pt able to perform HEP correctly with minimal cueing or supervision from therapist to encourage independent management of symptoms. (approp and ongoing)      Long term goals: 10 weeks or 20 visits   1. Pt will demonstrate increased lumbar ROM by at least 6 degrees from initial ROM value, resulting in improved ability to perform functional fwd bending while standing and sitting. (approp and ongoing)  2. Pt will demonstrate increased MedX average isometric strength value  by 20% from initial test resulting in improved  "ability to perform bending, lifting, and carrying activities safely, confidently.  (approp and ongoing)  3. Pt to demonstrate ability to independently control and reduce their pain through posture positioning and mechanical movements throughout a typical day.  (approp and ongoing)  4.  Pt will demonstrate reduced pain and improved functional outcomes as reported on the FOTO by reaching a limitation score of < or = 20% or less in order to demonstrate subjective improvement in pt's condition.    (approp and ongoing)  5. Pt will demonstrate independence with the HEP at discharge  (approp and ongoing)  6. Pt will report able to tolerance standing or walking for more than 30 minutes without increasing LBP by more than 1-2 points form baseline at rest  (approp and ongoing)  7. Pt will display a 4+/5 or better with BETO PIMENTEL's MMT strength (approp and ongoing)    Plan   Outpatient physical therapy 2x week for 10 weeks or 20 visits to include the following:   - Patient education  - Therapeutic exercise  - Manual therapy  - Performance testing   - Neuromuscular Re-education  - Therapeutic activity   - Modalities    Pt may be seen by PTA as part of the rehabilitation team.     Therapist: Radha Ferreira, PT  5/10/2022    "I certify the need for these services furnished under this plan of treatment and while under my care."    ____________________________________  Physician/Referring Practitioner    _______________  Date of Signature              "

## 2022-05-12 ENCOUNTER — TELEPHONE (OUTPATIENT)
Dept: REHABILITATION | Facility: OTHER | Age: 22
End: 2022-05-12
Payer: MEDICAID

## 2022-05-12 NOTE — TELEPHONE ENCOUNTER
HC talked to Pt about her experience during the evaluation; she reports that it was good and that her lower back was not sore after testing on our Med X machine.  I also talked to her about our health coaching service and she is interested.

## 2022-05-25 NOTE — PROGRESS NOTES
"[facility]      Name: Angelic Ramos  Clinic Number: [id number]    Therapy Diagnosis:   Encounter Diagnoses   Name Primary?    Decreased ROM of lumbar spine Yes    Decreased strength of trunk and back     Impaired functional mobility, balance, gait, and endurance     Weakness of both lower extremities      Physician: Vicente Min MD    Visit Date: 5/26/2022    Physician Orders: PT Eval and Treat   Note: Patient was admitted for mental health issues and did not follow up.  He is doing much better and is excited for the chance to re-enroll.     Medical Diagnosis from Referral:   M54.50,G89.29 (ICD-10-CM) - Chronic bilateral low back pain without sciatica  M43.16 (ICD-10-CM) - Spondylolisthesis at L4-L5 level     Evaluation Date: 5/10/2022  Authorization Period Expiration: 04/28/2023  Plan of Care Expiration: 07/19/22 (10 weeks)  Visit # / Visits authorized: 2/20 (may not need 20, got 7 visits into HB late 2021)      CHANGED IN SYSTEM TO [email or web address].BAN - GOES BY "MIHAELA" AND USES "HE/HIM"     Time In: 245 pm  Time Out: 330 pm  Total Billable Time: 45 minutes     Precautions: Standard s/p spondy and L4-5 fusion and TLIF (7/14/2020); Hx of depression; B knee pain; Psych. Reported seizure 2 months ago.      Pattern of pain determined: 1 PEN    Subjective   Angelic reports minimal change in symptoms since eval, still trying to figure out how to coordinate transportation because he's living in [place] since last time doing HB. Has been doing HEP and felt good with it.      Patient reports tolerating previous visit well with no residual symptoms or issues  Patient reports their pain to be 2/10 on a 0-10 scale with 0 being no pain and 10 being the worst pain imaginable.  Pain Location: B low back and hips     Work and leisure: unemployed currently  Pt goals: Be more active with less pain, get in shape    Objective     Baseline Isometric Testing on Med X equipment: Testing " "administered by PT  Date of testin/10/22   ROM 0-36 deg   Max Peak Torque 174    Min Peak Torque 45    Flex/Ext Ratio 3.86:1   % below normative data 18         Outcomes: FOTO  Initial score: 48% limited  Visit 5 score:  Goal: <40%      Treatment    Pt was instructed in and performed the following:     Angelic received therapeutic exercises to develop/improved posture, cardiovascular endurance, muscular endurance, lumbar/cervical ROM, strength and muscular endurance for 45 minutes including the following exercises:     LTR  Piriformis stretch   Active HSS x 10, 5" ea  PPT x 20  PPT + min elevation bridge x 10    [facility] - [facility] 2022   Visit Number 2   VAS Pain Rating 2   Treadmill Time (in min.) -   Time 5   Lumbar Stretches - Slouch -   Extension in Standing 10   Flexion in Lying 10   Lumbar Extension - Seat Pad -   Femur Restraint -   Top Dead Center -   Counterweight -   Lumbar Flexion -   Lumbar Extension -   Lumbar Peak Torque -   Lumbar Weight 60   Repetitions 16   Rating of Perceived Exertion 4         Peripheral muscle strengthening which included 1 set of 15-20 repetitions at a slow, controlled 10-13 second per rep pace focused on strengthening supporting musculature for improved body mechanics and functional mobility.  Pt and therapist focused on proper form during treatment to ensure optimal strengthening of each targeted muscle group.  Machines were utilized including torso rotation, leg extension, leg curl, chest press, upright row. Tricep extension, bicep curl, leg press, and hip abduction added visit 3    Angelic received the following manual therapy techniques: nil.         Home Exercises Provided and Patient Education Provided   Home exercises include: PPT, SKTC, LTR, SOC, Piriformis stretch  Cardio program: visit 5 (may remember from last time, see if he still has the handout)  Lifting education date: visit 11  Lumbar roll: not yet purchased, can't afford    Education " provided:   - Pacing and technique for initial lumbar MedX strengthening    Written Home Exercises Provided: Patient instructed to cont prior HEP.  Exercises were reviewed and Nilay was able to demonstrate them prior to the end of the session.  Nilay demonstrated good  understanding of the education provided.     See EMR under Patient Instructions for exercises provided prior visit.          Assessment   Nilay returns to second PT visit stating pain is up and down but overall decent, is still doing HEP from last time in the program when he remembers to. Had three cancels since eval due to transportation issues which he is still trying to get ironed out. May continue discussing transfer to [facility] if it continues to interfere with continuity of care. He performed well with mat exercises and felt better after, was able to initiate lumbar MedX strengthening at 60 ft/lb and completed 16 repetitions with a final RPE of 4/10. Was familiar with machines so did full peripheral circuit except for leg press and hip abd, will complete circuit starting next session.    Patient is making fair progress towards established goals.  Pt will continue to benefit from skilled outpatient physical therapy to address the deficits stated in the impairment chart, provide pt/family education and to maximize pt's level of independence in the home and community environment.     Anticipated Barriers for therapy: Transportation  Pt's spiritual, cultural and educational needs considered and pt agreeable to plan of care and goals as stated below:             GOALS: Pt is in agreement with the following goals.     Short term goals:  6 weeks or 10 visits   1.  Pt will demonstrate increased lumbar ROM by at least 3 degrees from the initial ROM value with improvements noted in functional ROM and ability to perform ADLs.  (approp and ongoing)  2.  Pt will demonstrate increased MedX average isometric strength value  by 10% from initial test  resulting in improved ability to perform bending, lifting, and carrying activities safely, confidently.  (approp and ongoing)  3.  Patient report a reduction in worst pain score by 1-2 points for improved tolerance for functional activities.  (approp and ongoing)  4.  Pt able to perform HEP correctly with minimal cueing or supervision from therapist to encourage independent management of symptoms. (approp and ongoing)        Long term goals: 10 weeks or 20 visits   1. Pt will demonstrate increased lumbar ROM by at least 6 degrees from initial ROM value, resulting in improved ability to perform functional fwd bending while standing and sitting. (approp and ongoing)  2. Pt will demonstrate increased MedX average isometric strength value  by 20% from initial test resulting in improved ability to perform bending, lifting, and carrying activities safely, confidently.  (approp and ongoing)  3. Pt to demonstrate ability to independently control and reduce their pain through posture positioning and mechanical movements throughout a typical day.  (approp and ongoing)  4.  Pt will demonstrate reduced pain and improved functional outcomes as reported on the FOTO by reaching a limitation score of < or = 20% or less in order to demonstrate subjective improvement in pt's condition.    (approp and ongoing)  5. Pt will demonstrate independence with the HEP at discharge  (approp and ongoing)  6. Pt will report able to tolerance standing or walking for more than 30 minutes without increasing LBP by more than 1-2 points form baseline at rest  (approp and ongoing)          Plan   Continue with established Plan of Care towards established PT goals.     Wiliam Kelly, PT, DPT  5/26/2022

## 2022-05-26 ENCOUNTER — CLINICAL SUPPORT (OUTPATIENT)
Dept: REHABILITATION | Facility: OTHER | Age: 22
End: 2022-05-26
Attending: ORTHOPAEDIC SURGERY
Payer: MEDICAID

## 2022-05-26 DIAGNOSIS — R29.898 DECREASED STRENGTH OF TRUNK AND BACK: ICD-10-CM

## 2022-05-26 DIAGNOSIS — R29.898 WEAKNESS OF BOTH LOWER EXTREMITIES: ICD-10-CM

## 2022-05-26 DIAGNOSIS — Z74.09 IMPAIRED FUNCTIONAL MOBILITY, BALANCE, GAIT, AND ENDURANCE: ICD-10-CM

## 2022-05-26 DIAGNOSIS — M53.86 DECREASED ROM OF LUMBAR SPINE: Primary | ICD-10-CM

## 2022-05-26 PROCEDURE — 97110 THERAPEUTIC EXERCISES: CPT

## 2022-05-31 ENCOUNTER — CLINICAL SUPPORT (OUTPATIENT)
Dept: REHABILITATION | Facility: OTHER | Age: 22
End: 2022-05-31
Attending: ORTHOPAEDIC SURGERY
Payer: MEDICAID

## 2022-05-31 DIAGNOSIS — R29.898 DECREASED STRENGTH OF TRUNK AND BACK: ICD-10-CM

## 2022-05-31 DIAGNOSIS — Z74.09 IMPAIRED FUNCTIONAL MOBILITY, BALANCE, GAIT, AND ENDURANCE: ICD-10-CM

## 2022-05-31 DIAGNOSIS — R29.898 WEAKNESS OF BOTH LOWER EXTREMITIES: ICD-10-CM

## 2022-05-31 DIAGNOSIS — M53.86 DECREASED ROM OF LUMBAR SPINE: Primary | ICD-10-CM

## 2022-05-31 PROCEDURE — 97110 THERAPEUTIC EXERCISES: CPT | Mod: CQ

## 2022-05-31 NOTE — PROGRESS NOTES
"[facility]      Name: Angelic Ramos  Clinic Number: [id number]    Therapy Diagnosis:   Encounter Diagnoses   Name Primary?    Decreased ROM of lumbar spine Yes    Decreased strength of trunk and back     Impaired functional mobility, balance, gait, and endurance     Weakness of both lower extremities      Physician: Vicente Min MD    Visit Date: 2022    Physician Orders: PT Eval and Treat   Note: Patient was admitted for mental health issues and did not follow up.  He is doing much better and is excited for the chance to re-enroll.     Medical Diagnosis from Referral:   M54.50,G89.29 (ICD-10-CM) - Chronic bilateral low back pain without sciatica  M43.16 (ICD-10-CM) - Spondylolisthesis at L4-L5 level     Evaluation Date: 5/10/2022  Authorization Period Expiration: 2023  Plan of Care Expiration: 22 (10 weeks)  Visit # / Visits authorized: 3/20 (may not need 20, got 7 visits into HB late )      CHANGED IN SYSTEM TO [email or web address].BAN - GOES BY "MIHAELA" AND USES "HE/HIM"     Time In: 2:45 pm  Time Out: 3:30 pm  Total Billable Time: 45 minutes     Precautions: Standard s/p spondy and L4-5 fusion and TLIF (2020); Hx of depression; B knee pain; Psych. Reported seizure 2 months ago.      Pattern of pain determined: 1 PEN    Subjective   Angelic reports continued lower back pain which varies in intensity.     Patient reports tolerating previous visit well with no residual symptoms or issues  Patient reports their pain to be 2/10 on a 0-10 scale with 0 being no pain and 10 being the worst pain imaginable.  Pain Location: B low back and hips     Work and leisure: unemployed currently  Pt goals: Be more active with less pain, get in shape    Objective     Baseline Isometric Testing on Med X equipment: Testing administered by PT  Date of testin/10/22   ROM 0-36 deg   Max Peak Torque 174    Min Peak Torque 45    Flex/Ext Ratio 3.86:1   % below normative data " "18     Outcomes: FOTO  Initial score: 48% limited  Visit 5 score:  Goal: <40%    Treatment    Pt was instructed in and performed the following:     Angelic received therapeutic exercises to develop/improved posture, cardiovascular endurance, muscular endurance, lumbar/cervical ROM, strength and muscular endurance for 45 minutes including the following exercises:     LTR x 10,5"  Piriformis stretch 3 x 20 sec  Active HSS x 10, 5" ea  PPT x 20  PPT + min elevation bridge +GTB x 10  +PPT with BKFO GTBx 10 each  EIS x 10    [facility] - [facility] 5/31/2022   Visit Number 3   VAS Pain Rating 3   Treadmill Time (in min.) 5   Time -   Lumbar Stretches - Slouch -   Extension in Standing 10   Flexion in Lying 10   Lumbar Extension - Seat Pad -   Femur Restraint -   Top Dead Center -   Counterweight -   Lumbar Flexion -   Lumbar Extension -   Lumbar Peak Torque -   Lumbar Weight 60   Repetitions 20   Rating of Perceived Exertion 4     Peripheral muscle strengthening which included 1 set of 15-20 repetitions at a slow, controlled 10-13 second per rep pace focused on strengthening supporting musculature for improved body mechanics and functional mobility.  Pt and therapist focused on proper form during treatment to ensure optimal strengthening of each targeted muscle group.  Machines were utilized including torso rotation, leg extension, leg curl, chest press, upright row. Tricep extension, bicep curl, leg press, and hip abduction added visit 3    Angelic received the following manual therapy techniques: nil.     Home Exercises Provided and Patient Education Provided   Home exercises include: PPT, SKTC, LTR, SOC, Piriformis stretch  Cardio program: visit 5 (may remember from last time, see if he still has the handout)  Lifting education date: visit 11  Lumbar roll: not yet purchased, can't afford    Education provided:   - Pacing and technique for initial lumbar MedX strengthening    Written Home Exercises Provided: " Patient instructed to cont prior HEP.  Exercises were reviewed and Nilay was able to demonstrate them prior to the end of the session.  Nilay demonstrated good  understanding of the education provided.     See EMR under Patient Instructions for exercises provided prior visit.    Assessment   Nilay returns for his 3rd [facility] visit with tolerable report of back pain today.  Treatment continued with lumbopelvic/core flexibility and strengthening ex's which he was able to perform without increased symptoms including the addition of BKFO RTB. Lumbar MedX resistance was  maintained at 60# progressing to complete 20 reps with RPE = 4/10.  He was also able to complete the full circuit of peripheral ex's without c/o. Will look to progress per HB protocol and patient tolerance.    Patient is making fair progress towards established goals.  Pt will continue to benefit from skilled outpatient physical therapy to address the deficits stated in the impairment chart, provide pt/family education and to maximize pt's level of independence in the home and community environment.     Anticipated Barriers for therapy: Transportation  Pt's spiritual, cultural and educational needs considered and pt agreeable to plan of care and goals as stated below:     GOALS: Pt is in agreement with the following goals.     Short term goals:  6 weeks or 10 visits   1.  Pt will demonstrate increased lumbar ROM by at least 3 degrees from the initial ROM value with improvements noted in functional ROM and ability to perform ADLs.  (approp and ongoing)  2.  Pt will demonstrate increased MedX average isometric strength value  by 10% from initial test resulting in improved ability to perform bending, lifting, and carrying activities safely, confidently.  (approp and ongoing)  3.  Patient report a reduction in worst pain score by 1-2 points for improved tolerance for functional activities.  (approp and ongoing)  4.  Pt able to perform HEP correctly with  minimal cueing or supervision from therapist to encourage independent management of symptoms. (approp and ongoing)        Long term goals: 10 weeks or 20 visits   1. Pt will demonstrate increased lumbar ROM by at least 6 degrees from initial ROM value, resulting in improved ability to perform functional fwd bending while standing and sitting. (approp and ongoing)  2. Pt will demonstrate increased MedX average isometric strength value  by 20% from initial test resulting in improved ability to perform bending, lifting, and carrying activities safely, confidently.  (approp and ongoing)  3. Pt to demonstrate ability to independently control and reduce their pain through posture positioning and mechanical movements throughout a typical day.  (approp and ongoing)  4.  Pt will demonstrate reduced pain and improved functional outcomes as reported on the FOTO by reaching a limitation score of < or = 20% or less in order to demonstrate subjective improvement in pt's condition.    (approp and ongoing)  5. Pt will demonstrate independence with the HEP at discharge  (approp and ongoing)  6. Pt will report able to tolerance standing or walking for more than 30 minutes without increasing LBP by more than 1-2 points form baseline at rest  (approp and ongoing)    Plan   Continue with established Plan of Care towards established PT goals.     Leodan Williamson, PTA

## 2022-06-02 ENCOUNTER — CLINICAL SUPPORT (OUTPATIENT)
Dept: REHABILITATION | Facility: OTHER | Age: 22
End: 2022-06-02
Attending: ORTHOPAEDIC SURGERY
Payer: MEDICAID

## 2022-06-02 DIAGNOSIS — R29.898 WEAKNESS OF BOTH LOWER EXTREMITIES: ICD-10-CM

## 2022-06-02 DIAGNOSIS — R29.898 DECREASED STRENGTH OF TRUNK AND BACK: ICD-10-CM

## 2022-06-02 DIAGNOSIS — M53.86 DECREASED ROM OF LUMBAR SPINE: Primary | ICD-10-CM

## 2022-06-02 DIAGNOSIS — Z74.09 IMPAIRED FUNCTIONAL MOBILITY, BALANCE, GAIT, AND ENDURANCE: ICD-10-CM

## 2022-06-02 PROCEDURE — 97110 THERAPEUTIC EXERCISES: CPT

## 2022-06-02 NOTE — PROGRESS NOTES
"[facility]      Name: Angelic Ramos  Clinic Number: [id number]    Therapy Diagnosis:   Encounter Diagnoses   Name Primary?    Decreased ROM of lumbar spine Yes    Decreased strength of trunk and back     Impaired functional mobility, balance, gait, and endurance     Weakness of both lower extremities      Physician: Vicente Min MD    Visit Date: 2022    Physician Orders: PT Eval and Treat   Note: Patient was admitted for mental health issues and did not follow up.  He is doing much better and is excited for the chance to re-enroll.     Medical Diagnosis from Referral:   M54.50,G89.29 (ICD-10-CM) - Chronic bilateral low back pain without sciatica  M43.16 (ICD-10-CM) - Spondylolisthesis at L4-L5 level     Evaluation Date: 5/10/2022  Authorization Period Expiration: 2023  Plan of Care Expiration: 22 (10 weeks)  Visit # / Visits authorized:  (may not need 20, got 7 visits into HB late )     CHANGED IN SYSTEM TO [email or web address].ABN - GOES BY "MIHAELA" AND USES "HE/HIM"     Time In: 2:45 pm  Time Out: 3:45 pm  Total Billable Time: 60 minutes     Precautions: Standard s/p spondy and L4-5 fusion and TLIF (2020); Hx of depression; B knee pain; Psych. Reported seizure 2 months ago.      Pattern of pain determined: 1 PEN    Subjective   Pemberton reports continued lower back pain which varies in intensity, generally does LTR and it helps with the pain but yesterday he slept a lot because he was worried     Patient reports tolerating previous visit well with no residual symptoms or issues  Patient reports their pain to be 2/10 on a 0-10 scale with 0 being no pain and 10 being the worst pain imaginable.  Pain Location: B low back and hips     Work and leisure: unemployed currently  Pt goals: Be more active with less pain, get in shape    Objective     Baseline Isometric Testing on Med X equipment: Testing administered by PT  Date of testin/10/22   ROM 0-36 deg " "  Max Peak Torque 174    Min Peak Torque 45    Flex/Ext Ratio 3.86:1   % below normative data 18     Outcomes: FOTO  Initial score: 48% limited  Visit 5 score:  Goal: <40%    Treatment    Pt was instructed in and performed the following:     Angelic received therapeutic exercises to develop/improved posture, cardiovascular endurance, muscular endurance, lumbar/cervical ROM, strength and muscular endurance for 55 minutes including the following exercises:     LTR x 10,5"  Piriformis stretch 3 x 20 sec  Active HSS x 10, 5" ea  PPT x 20  PPT + min elevation bridge x 10  PPT with BKFO GTBx 10 each  EIS x 10    [facility] - Short 6/2/2022   Visit Number 4   VAS Pain Rating 2   Treadmill Time (in min.) 5   Time -   Lumbar Stretches - Slouch -   Extension in Standing 10   Flexion in Lying 10   Lumbar Extension - Seat Pad -   Femur Restraint -   Top Dead Center -   Counterweight -   Lumbar Flexion 39   Lumbar Extension 0   Lumbar Peak Torque -   Lumbar Weight 66   Repetitions 15   Rating of Perceived Exertion 5         Peripheral muscle strengthening which included 1 set of 15-20 repetitions at a slow, controlled 10-13 second per rep pace focused on strengthening supporting musculature for improved body mechanics and functional mobility.  Pt and therapist focused on proper form during treatment to ensure optimal strengthening of each targeted muscle group.  Machines were utilized including torso rotation, leg extension, leg curl, chest press, upright row. Tricep extension, bicep curl, leg press, and hip abduction added visit 3    Angelic received the following manual therapy techniques: nil.     Home Exercises Provided and Patient Education Provided   Home exercises include: PPT, SKTC, LTR, SOC, Piriformis stretch  Cardio program: visit 5 (may remember from last time, see if he still has the handout)  Lifting education date: visit 11  Lumbar roll: not yet purchased, can't afford    Education provided:   - Pacing " and progression for lumbar MedX strengthening  - Need for more regular stretching and ice at home    Written Home Exercises Provided: Patient instructed to cont prior HEP.  Exercises were reviewed and Nilay was able to demonstrate them prior to the end of the session.  Nilay demonstrated good  understanding of the education provided.     See EMR under Patient Instructions for exercises provided prior visit.    Assessment   Nilay returns for his [facility] visit with tolerable report of back pain today though was resting a lot today because of it. Encouraged more regular stretching and ice in order to more steadily regulate symptoms and prevent them from being so significant he considers not moving for any extended time.  Treatment continued with lumbopelvic/core flexibility and strengthening ex's which he was able to perform without increased symptoms including the addition of BKFO RTB. Lumbar MedX resistance was progressed to 66 ft/lb and ROM increased to 0-39 deg and he was able to complete 15 reps with RPE = 5/10.  He was also able to complete the full circuit of peripheral ex's without c/o. Will look to progress per HB protocol and patient tolerance.    Patient is making fair progress towards established goals.  Pt will continue to benefit from skilled outpatient physical therapy to address the deficits stated in the impairment chart, provide pt/family education and to maximize pt's level of independence in the home and community environment.     Anticipated Barriers for therapy: Transportation  Pt's spiritual, cultural and educational needs considered and pt agreeable to plan of care and goals as stated below:     GOALS: Pt is in agreement with the following goals.     Short term goals:  6 weeks or 10 visits   1.  Pt will demonstrate increased lumbar ROM by at least 3 degrees from the initial ROM value with improvements noted in functional ROM and ability to perform ADLs.  (approp and ongoing)  2.  Pt will  demonstrate increased MedX average isometric strength value  by 10% from initial test resulting in improved ability to perform bending, lifting, and carrying activities safely, confidently.  (approp and ongoing)  3.  Patient report a reduction in worst pain score by 1-2 points for improved tolerance for functional activities.  (approp and ongoing)  4.  Pt able to perform HEP correctly with minimal cueing or supervision from therapist to encourage independent management of symptoms. (approp and ongoing)        Long term goals: 10 weeks or 20 visits   1. Pt will demonstrate increased lumbar ROM by at least 6 degrees from initial ROM value, resulting in improved ability to perform functional fwd bending while standing and sitting. (approp and ongoing)  2. Pt will demonstrate increased MedX average isometric strength value  by 20% from initial test resulting in improved ability to perform bending, lifting, and carrying activities safely, confidently.  (approp and ongoing)  3. Pt to demonstrate ability to independently control and reduce their pain through posture positioning and mechanical movements throughout a typical day.  (approp and ongoing)  4.  Pt will demonstrate reduced pain and improved functional outcomes as reported on the FOTO by reaching a limitation score of < or = 20% or less in order to demonstrate subjective improvement in pt's condition.    (approp and ongoing)  5. Pt will demonstrate independence with the HEP at discharge  (approp and ongoing)  6. Pt will report able to tolerance standing or walking for more than 30 minutes without increasing LBP by more than 1-2 points form baseline at rest  (approp and ongoing)    Plan   Continue with established Plan of Care towards established PT goals.     Wiliam Kelly, PT, DPT  6/2/2022

## 2022-06-07 ENCOUNTER — CLINICAL SUPPORT (OUTPATIENT)
Dept: REHABILITATION | Facility: OTHER | Age: 22
End: 2022-06-07
Attending: ORTHOPAEDIC SURGERY
Payer: MEDICAID

## 2022-06-07 DIAGNOSIS — R29.898 DECREASED STRENGTH OF TRUNK AND BACK: ICD-10-CM

## 2022-06-07 DIAGNOSIS — Z74.09 IMPAIRED FUNCTIONAL MOBILITY, BALANCE, GAIT, AND ENDURANCE: ICD-10-CM

## 2022-06-07 DIAGNOSIS — R29.898 WEAKNESS OF BOTH LOWER EXTREMITIES: ICD-10-CM

## 2022-06-07 DIAGNOSIS — M53.86 DECREASED ROM OF LUMBAR SPINE: Primary | ICD-10-CM

## 2022-06-07 PROCEDURE — 97110 THERAPEUTIC EXERCISES: CPT | Mod: CQ

## 2022-06-07 NOTE — PROGRESS NOTES
"[facility]      Name: Angelic Ramos  Clinic Number: [id number]    Therapy Diagnosis:   Encounter Diagnoses   Name Primary?    Decreased ROM of lumbar spine Yes    Decreased strength of trunk and back     Impaired functional mobility, balance, gait, and endurance     Weakness of both lower extremities      Physician: Vicente Min MD    Visit Date: 2022    Physician Orders: PT Eval and Treat   Note: Patient was admitted for mental health issues and did not follow up.  He is doing much better and is excited for the chance to re-enroll.     Medical Diagnosis from Referral:   M54.50,G89.29 (ICD-10-CM) - Chronic bilateral low back pain without sciatica  M43.16 (ICD-10-CM) - Spondylolisthesis at L4-L5 level     Evaluation Date: 5/10/2022  Authorization Period Expiration: 2023  Plan of Care Expiration: 22 (10 weeks)  Visit # / Visits authorized:  (may not need 20, got 7 visits into HB late )     CHANGED IN SYSTEM TO [email or web address].BAN - GOES BY "MIHAELA" AND USES "HE/HIM"     Time In: 2:45 pm  Time Out:3:35 pm  Total Billable Time: 45 minutes     Precautions: Standard s/p spondy and L4-5 fusion and TLIF (2020); Hx of depression; B knee pain; Psych. Reported seizure 2 months ago.      Pattern of pain determined: 1 PEN    Subjective   Pemberton reports having her usual mild lower back discomfort. States that he has been trying to be a little more consistent with HEP performance.     Patient reports tolerating previous visit well with no residual symptoms or issues  Patient reports their pain to be 2/10 on a 0-10 scale with 0 being no pain and 10 being the worst pain imaginable.  Pain Location: B low back and hips     Work and leisure: unemployed currently  Pt goals: Be more active with less pain, get in shape    Objective     Baseline Isometric Testing on Med X equipment: Testing administered by PT  Date of testin/10/22   ROM 0-36 deg   Max Peak Torque 174  " "  Min Peak Torque 45    Flex/Ext Ratio 3.86:1   % below normative data 18     Outcomes: FOTO  Initial score: 48% limited  Visit 5 score:  Goal: <40%    Treatment    Pt was instructed in and performed the following:     Angelic received therapeutic exercises to develop/improved posture, cardiovascular endurance, muscular endurance, lumbar/cervical ROM, strength and muscular endurance for 50 minutes including the following exercises:     LTR x 10,5"  Piriformis stretch 3 x 20 sec  Active HSS x 10, 5" ea  PPT x 20  PPT + min elevation bridge x 15  PPT with BKFO GTBx 15 each   EIS x 10    [facility] - Short 6/7/2022   Visit Number 5   VAS Pain Rating 2   Treadmill Time (in min.) -   Time 5   Lumbar Stretches - Slouch -   Extension in Standing 10   Flexion in Lying 10   Lumbar Extension - Seat Pad -   Femur Restraint -   Top Dead Center -   Counterweight -   Lumbar Flexion -   Lumbar Extension -   Lumbar Peak Torque -   Lumbar Weight 66   Repetitions 18   Rating of Perceived Exertion 4     Peripheral muscle strengthening which included 1 set of 15-20 repetitions at a slow, controlled 10-13 second per rep pace focused on strengthening supporting musculature for improved body mechanics and functional mobility.  Pt and therapist focused on proper form during treatment to ensure optimal strengthening of each targeted muscle group.  Machines were utilized including torso rotation, leg extension, leg curl, chest press, upright row. Tricep extension, bicep curl, leg press, and hip abduction added visit 3    Angelic received the following manual therapy techniques: nil.     Home Exercises Provided and Patient Education Provided   Home exercises include: PPT, SKTC, LTR, SOC, Piriformis stretch  Cardio program: visit 5 (may remember from last time, see if he still has the handout)  Lifting education date: visit 11  Lumbar roll: not yet purchased, can't afford    Education provided:   - Pacing and progression for lumbar " MedX strengthening  - Need for more regular stretching and ice at home    Written Home Exercises Provided: Patient instructed to cont prior HEP.  Exercises were reviewed and Nilay was able to demonstrate them prior to the end of the session.  Nilay demonstrated good  understanding of the education provided.     See EMR under Patient Instructions for exercises provided prior visit.    Assessment   Nilay returns with mild report of back pain today.  Treatment continued with lumbopelvic/core flexibility and strengthening ex's which he was able to perform without increased symptoms including progressed reps for bridging and BKFO.. Lumbar MedX resistance was maintained at 66 ft/lb and he was able to complete 18 reps with RPE = 4/10.  Will look to progress per HB protocol and patient tolerance.    Patient is making fair progress towards established goals.  Pt will continue to benefit from skilled outpatient physical therapy to address the deficits stated in the impairment chart, provide pt/family education and to maximize pt's level of independence in the home and community environment.     Anticipated Barriers for therapy: Transportation  Pt's spiritual, cultural and educational needs considered and pt agreeable to plan of care and goals as stated below:     GOALS: Pt is in agreement with the following goals.     Short term goals:  6 weeks or 10 visits   1.  Pt will demonstrate increased lumbar ROM by at least 3 degrees from the initial ROM value with improvements noted in functional ROM and ability to perform ADLs.  (approp and ongoing)  2.  Pt will demonstrate increased MedX average isometric strength value  by 10% from initial test resulting in improved ability to perform bending, lifting, and carrying activities safely, confidently.  (approp and ongoing)  3.  Patient report a reduction in worst pain score by 1-2 points for improved tolerance for functional activities.  (approp and ongoing)  4.  Pt able to perform HEP  correctly with minimal cueing or supervision from therapist to encourage independent management of symptoms. (approp and ongoing)        Long term goals: 10 weeks or 20 visits   1. Pt will demonstrate increased lumbar ROM by at least 6 degrees from initial ROM value, resulting in improved ability to perform functional fwd bending while standing and sitting. (approp and ongoing)  2. Pt will demonstrate increased MedX average isometric strength value  by 20% from initial test resulting in improved ability to perform bending, lifting, and carrying activities safely, confidently.  (approp and ongoing)  3. Pt to demonstrate ability to independently control and reduce their pain through posture positioning and mechanical movements throughout a typical day.  (approp and ongoing)  4.  Pt will demonstrate reduced pain and improved functional outcomes as reported on the FOTO by reaching a limitation score of < or = 20% or less in order to demonstrate subjective improvement in pt's condition.    (approp and ongoing)  5. Pt will demonstrate independence with the HEP at discharge  (approp and ongoing)  6. Pt will report able to tolerance standing or walking for more than 30 minutes without increasing LBP by more than 1-2 points form baseline at rest  (approp and ongoing)    Plan   Continue with established Plan of Care towards established PT goals.     Leodan Williamson, PTA,    6/7/2022

## 2022-06-15 ENCOUNTER — OFFICE VISIT (OUTPATIENT)
Dept: NEUROLOGY | Facility: CLINIC | Age: 22
End: 2022-06-15
Payer: MEDICAID

## 2022-06-15 DIAGNOSIS — R56.9 SEIZURE-LIKE ACTIVITY: ICD-10-CM

## 2022-06-15 PROCEDURE — 1160F PR REVIEW ALL MEDS BY PRESCRIBER/CLIN PHARMACIST DOCUMENTED: ICD-10-PCS | Mod: CPTII,95,, | Performed by: PSYCHIATRY & NEUROLOGY

## 2022-06-15 PROCEDURE — 99205 PR OFFICE/OUTPT VISIT, NEW, LEVL V, 60-74 MIN: ICD-10-PCS | Mod: 95,,, | Performed by: PSYCHIATRY & NEUROLOGY

## 2022-06-15 PROCEDURE — 1159F PR MEDICATION LIST DOCUMENTED IN MEDICAL RECORD: ICD-10-PCS | Mod: CPTII,95,, | Performed by: PSYCHIATRY & NEUROLOGY

## 2022-06-15 PROCEDURE — 1159F MED LIST DOCD IN RCRD: CPT | Mod: CPTII,95,, | Performed by: PSYCHIATRY & NEUROLOGY

## 2022-06-15 PROCEDURE — 99205 OFFICE O/P NEW HI 60 MIN: CPT | Mod: 95,,, | Performed by: PSYCHIATRY & NEUROLOGY

## 2022-06-15 PROCEDURE — 1160F RVW MEDS BY RX/DR IN RCRD: CPT | Mod: CPTII,95,, | Performed by: PSYCHIATRY & NEUROLOGY

## 2022-06-15 RX ORDER — MIRTAZAPINE 30 MG/1
30 TABLET, FILM COATED ORAL NIGHTLY
COMMUNITY

## 2022-06-15 NOTE — PROGRESS NOTES
[facility] - NEUROLOGY [facility]TH [place] [facility], [place]    Date: Shelley 15, 2022   Patient Name: Angelic Ramos   MRN: [id number]   PCP: Billie Khan  Referring Provider: Malachi Gtz, DO    The patient location is: home   The chief complaint leading to consultation is: seizure    Visit type: audiovisual  Face to Face time with patient: 60 minutes of total time spent on the encounter, which includes face to face time and non-face to face time preparing to see the patient (eg, review of tests), Obtaining and/or reviewing separately obtained history, Documenting clinical information in the electronic or other health record, Independently interpreting results (not separately reported) and communicating results to the patient/family/caregiver, or Care coordination (not separately reported).   Each patient to whom he or she provides medical services by telemedicine is:  (1) informed of the relationship between the physician and patient and the respective role of any other health care provider with respect to management of the patient; and (2) notified that he or she may decline to receive medical services by telemedicine and may withdraw from such care at any time.    Assessment:      This is Angelic Ramos, 22 y.o. female to male with single generalized convulsion, will need to evaluate for underlying seizure tendency     Plan:      -  MRI Brain  -  EEG    Discussed that he may continue to drive as long as he remains seizure free       Greater than 60 minutes spent in chart review, documentation, independent review of imaging, and face to face time with patient    I discussed side effects of the medications. I asked the patient to stop the medication if He notices serious adverse effects as we discussed and to seek immediate medical attention at an ER.     Alec Jackman MD  [facility]   Department of Neurology    Subjective:        HPI:   Ms. Angelic Ramos  is a 22 y.o. female to male who presents with a chief complaint of seizure    March 2022 patient was at home with girlfriend when he had witnessed generalized convulsion lasting about 30 seconds with minimal post ictal period.  He notes several missed doses of Lamictal (taken for mood) and Cymbalta in the days leading up to this and was taking baclofen 20mg bid to tid around this time although he has since transitioned from Cymbalta to Remeron and stopped baclofen.  He also has frequent MJ use.  He presented to the ED the next day where CT head was normal and sent home.  He has remained without seizure since that time although he had an instance of time lapse of unclear duration while using LSD with friends in May.    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      PAST MEDICAL HISTORY:  Past Medical History:   Diagnosis Date    Depression     Scoliosis     Spondylolysis     Urinary tract infection        PAST SURGICAL HISTORY:  Past Surgical History:   Procedure Laterality Date    BACK SURGERY      TRANSFORAMINAL LUMBAR INTERBODY FUSION  7/14/2020    Procedure: FUSION, SPINE, LUMBAR, TLIF;  Surgeon: Vicente Min MD;  Location: [facility] OR [address] [place] [address];  Service: Orthopedics;;  L4-5    WISDOM TOOTH EXTRACTION         CURRENT MEDS:  Current Outpatient Medications   Medication Sig Dispense Refill    mirtazapine (REMERON) 30 MG tablet Take 30 mg by mouth every evening.      baclofen (LIORESAL) 20 MG tablet Take 20 mg by mouth 3 (three) times daily.      emtricitabine-tenofovir 200-300 mg (TRUVADA) 200-300 mg Tab Take 1 tablet by mouth once daily.      ibuprofen (ADVIL,MOTRIN) 600 MG tablet Take 1 tablet (600 mg total) by mouth every 6 (six) hours as needed for Pain. (Patient not taking: No sig reported) 30 tablet 0    lamoTRIgine (LAMICTAL) 150 MG Tab Take 150 mg by mouth 2 (two) times daily.      naproxen (NAPROSYN) 500 MG tablet Take 1 tablet (500 mg  "total) by mouth 2 (two) times daily with meals. 60 tablet 1    needle, disp, 25 gauge 25 gauge x 5/8" Ndle Use as directed to inject testosterone 100 each 2    omeprazole (PRILOSEC) 40 MG capsule Take 1 capsule (40 mg total) by mouth every morning. 30 capsule 1    syringe, disposable, 1 mL Syrg Use as directed to inject testosterone 50 Syringe 3    testosterone cypionate (DEPOTESTOTERONE CYPIONATE) 100 mg/mL injection Inject 0.5 mLs (50 mg total) into the muscle every 14 (fourteen) days. 10 mL 1     No current facility-administered medications for this visit.       ALLERGIES:  Review of patient's allergies indicates:   Allergen Reactions    Lactose Other (See Comments)       FAMILY HISTORY:  Family History   Problem Relation Age of Onset    Breast cancer Maternal Aunt     Ovarian cancer Maternal Aunt     Congenital heart disease Maternal Uncle     Heart disease Maternal Grandfather     Colon cancer Neg Hx        SOCIAL HISTORY:  Social History     Tobacco Use    Smoking status: Never Smoker    Smokeless tobacco: Never Used    Tobacco comment: vapes with CBD   Substance Use Topics    Alcohol use: No    Drug use: Yes     Frequency: 3.0 times per week     Types: Marijuana       Review of Systems:  12 review of systems is negative except for the symptoms mentioned in HPI.        Objective:   There were no vitals filed for this visit.    General: NAD, well nourished   Eyes: no tearing, discharge, no erythema   ENT: moist mucous membranes of the oral cavity, nares patent    Neck: Supple, full range of motion  Cardiovascular: well perfused  Lungs: Normal work of breathing, normal chest wall excursions  Skin: No rash, lesions, or breakdown on exposed skin  Psychiatry: Mood and affect are appropriate   Abdomen: non distended  Extremeties: No cyanosis, clubbing or edema.    Neurological   MENTAL STATUS: Alert and oriented to person, place, and time. Attention and concentration within normal limits. Speech " without dysarthria, able to name and repeat without difficulty. Recent and remote memory within normal limits   CRANIAL NERVES: Visual fields intact. PERRL. EOMI. Facial sensation intact. Face symmetrical. Hearing grossly intact. Full shoulder shrug bilaterally. Tongue protrudes midline   SENSORY: Sensation is intact to light touch throughout.  Negative Romberg.   MOTOR: Normal bulk and tone. No pronator drift.    REFLEXES: NA  CEREBELLAR/COORDINATION/GAIT: Gait steady with normal arm swing and stride length.

## 2022-06-17 ENCOUNTER — CLINICAL SUPPORT (OUTPATIENT)
Dept: REHABILITATION | Facility: OTHER | Age: 22
End: 2022-06-17
Attending: ORTHOPAEDIC SURGERY
Payer: MEDICAID

## 2022-06-17 DIAGNOSIS — M53.86 DECREASED ROM OF LUMBAR SPINE: Primary | ICD-10-CM

## 2022-06-17 DIAGNOSIS — Z74.09 IMPAIRED FUNCTIONAL MOBILITY, BALANCE, GAIT, AND ENDURANCE: ICD-10-CM

## 2022-06-17 DIAGNOSIS — R29.898 WEAKNESS OF BOTH LOWER EXTREMITIES: ICD-10-CM

## 2022-06-17 DIAGNOSIS — R29.898 DECREASED STRENGTH OF TRUNK AND BACK: ICD-10-CM

## 2022-06-17 PROCEDURE — 97110 THERAPEUTIC EXERCISES: CPT

## 2022-06-17 NOTE — PROGRESS NOTES
"[facility]      Name: Angelic Ramos  Clinic Number: [id number]    Therapy Diagnosis:   Encounter Diagnoses   Name Primary?    Decreased ROM of lumbar spine Yes    Decreased strength of trunk and back     Impaired functional mobility, balance, gait, and endurance     Weakness of both lower extremities      Physician: Vicente Min MD    Visit Date: 6/17/2022    Physician Orders: PT Eval and Treat   Note: Patient was admitted for mental health issues and did not follow up.  He is doing much better and is excited for the chance to re-enroll.     Medical Diagnosis from Referral:   M54.50,G89.29 (ICD-10-CM) - Chronic bilateral low back pain without sciatica  M43.16 (ICD-10-CM) - Spondylolisthesis at L4-L5 level     Evaluation Date: 5/10/2022  Authorization Period Expiration: 04/28/2023  Plan of Care Expiration: 07/19/22 (10 weeks)  Visit # / Visits authorized: 6/20 (may not need 20, got 7 visits into HB late 2021)     CHANGED IN SYSTEM TO [email or web address].BAN - GOES BY "MIHAELA" AND USES "HE/HIM"     Time In: 2:10 pm  Time Out:3:00 pm  Total Billable Time: 50 minutes     Precautions: Standard s/p spondy and L4-5 fusion and TLIF (7/14/2020); Hx of depression; B knee pain; Psych. Reported seizure 2 months ago.      Pattern of pain determined: 1 PEN    Subjective   Pemberton reports having his usual mild lower back discomfort. States that he has been trying to be a little more consistent with HEP performance but LTR has definitely felt good all the time.     Patient reports tolerating previous visit well with no residual symptoms or issues  Patient reports their pain to be 2/10 on a 0-10 scale with 0 being no pain and 10 being the worst pain imaginable.  Pain Location: B low back and hips     Work and leisure: unemployed currently  Pt goals: Be more active with less pain, get in shape    Objective     Baseline Isometric Testing on Med X equipment: Testing administered by PT  Date of testing: " "05/10/22   ROM 0-36 deg   Max Peak Torque 174    Min Peak Torque 45    Flex/Ext Ratio 3.86:1   % below normative data 18     Outcomes: FOTO  Initial score: 51% limited  Visit 6 score: 50%  Goal: <40%    Treatment    Pt was instructed in and performed the following:     Angelic received therapeutic exercises to develop/improved posture, cardiovascular endurance, muscular endurance, lumbar/cervical ROM, strength and muscular endurance for 50 minutes including the following exercises:     LTR x 10,5"  Piriformis stretch 3 x 20 sec  Active HSS x 10, 5" ea  PPT + min elevation bridge c/pilates ring iso ABD x 10  PPT + min elevation bridge c/pilates ring iso ADD x 10  PPT with BKFO GTBx 15 each   EIS x 10    [facility] - Short 6/17/2022   Visit Number 6   VAS Pain Rating 1   Treadmill Time (in min.) 5   Time -   Lumbar Stretches - Slouch -   Extension in Standing 10   Flexion in Lying 10   Lumbar Extension - Seat Pad -   Femur Restraint -   Top Dead Center -   Counterweight -   Lumbar Flexion 42   Lumbar Extension 0   Lumbar Peak Torque -   Lumbar Weight 66   Repetitions 20   Rating of Perceived Exertion 4         Peripheral muscle strengthening which included 1 set of 15-20 repetitions at a slow, controlled 10-13 second per rep pace focused on strengthening supporting musculature for improved body mechanics and functional mobility.  Pt and therapist focused on proper form during treatment to ensure optimal strengthening of each targeted muscle group.  Machines were utilized including torso rotation, leg extension, leg curl, chest press, upright row. Tricep extension, bicep curl, leg press, and hip abduction added visit 3    Angelic received the following manual therapy techniques: nil.     Home Exercises Provided and Patient Education Provided   Home exercises include: PPT, SKTC, LTR, SOC, Piriformis stretch  Cardio program: visit 5 (may remember from last time, see if he still has the handout)  Lifting " education date: visit 11  Lumbar roll: not yet purchased, can't afford    Education provided:   - Pacing and progression for lumbar MedX strengthening  - Need for more regular stretching and ice at home    Written Home Exercises Provided: Patient instructed to cont prior HEP.  Exercises were reviewed and Nilay was able to demonstrate them prior to the end of the session.  Pemberton demonstrated good  understanding of the education provided.     See EMR under Patient Instructions for exercises provided prior visit.    Assessment   Pemberton returns with report of slightly improved but still present back pain today.  Treatment continued with lumbopelvic/core flexibility and strengthening ex's which he was able to perform without increased symptoms including progressed reps for bridging and BKFO. Good technique with progressions but realtively limited tolerance, will benefit from gradual progression. Lumbar MedX ROM increased to 0-42 and resistance was maintained at 66 ft/lb and he was able to complete 20 reps with RPE = 4/10.  Will look to progress per HB protocol and patient tolerance.    Patient is making fair progress towards established goals.  Pt will continue to benefit from skilled outpatient physical therapy to address the deficits stated in the impairment chart, provide pt/family education and to maximize pt's level of independence in the home and community environment.     Anticipated Barriers for therapy: Transportation  Pt's spiritual, cultural and educational needs considered and pt agreeable to plan of care and goals as stated below:     GOALS: Pt is in agreement with the following goals.     Short term goals:  6 weeks or 10 visits   1.  Pt will demonstrate increased lumbar ROM by at least 3 degrees from the initial ROM value with improvements noted in functional ROM and ability to perform ADLs.  (approp and ongoing)  2.  Pt will demonstrate increased MedX average isometric strength value  by 10% from initial test  resulting in improved ability to perform bending, lifting, and carrying activities safely, confidently.  (approp and ongoing)  3.  Patient report a reduction in worst pain score by 1-2 points for improved tolerance for functional activities.  (approp and ongoing)  4.  Pt able to perform HEP correctly with minimal cueing or supervision from therapist to encourage independent management of symptoms. (approp and ongoing)        Long term goals: 10 weeks or 20 visits   1. Pt will demonstrate increased lumbar ROM by at least 6 degrees from initial ROM value, resulting in improved ability to perform functional fwd bending while standing and sitting. (approp and ongoing)  2. Pt will demonstrate increased MedX average isometric strength value  by 20% from initial test resulting in improved ability to perform bending, lifting, and carrying activities safely, confidently.  (approp and ongoing)  3. Pt to demonstrate ability to independently control and reduce their pain through posture positioning and mechanical movements throughout a typical day.  (approp and ongoing)  4.  Pt will demonstrate reduced pain and improved functional outcomes as reported on the FOTO by reaching a limitation score of < or = 20% or less in order to demonstrate subjective improvement in pt's condition.    (approp and ongoing)  5. Pt will demonstrate independence with the HEP at discharge  (approp and ongoing)  6. Pt will report able to tolerance standing or walking for more than 30 minutes without increasing LBP by more than 1-2 points form baseline at rest  (approp and ongoing)    Plan   Continue with established Plan of Care towards established PT goals.     Wiliam Kelly, PT,    6/17/2022

## 2022-06-21 ENCOUNTER — CLINICAL SUPPORT (OUTPATIENT)
Dept: REHABILITATION | Facility: OTHER | Age: 22
End: 2022-06-21
Attending: ORTHOPAEDIC SURGERY
Payer: MEDICAID

## 2022-06-21 DIAGNOSIS — M53.86 DECREASED ROM OF LUMBAR SPINE: Primary | ICD-10-CM

## 2022-06-21 DIAGNOSIS — Z74.09 IMPAIRED FUNCTIONAL MOBILITY, BALANCE, GAIT, AND ENDURANCE: ICD-10-CM

## 2022-06-21 DIAGNOSIS — R29.898 WEAKNESS OF BOTH LOWER EXTREMITIES: ICD-10-CM

## 2022-06-21 DIAGNOSIS — R29.898 DECREASED STRENGTH OF TRUNK AND BACK: ICD-10-CM

## 2022-06-21 PROCEDURE — 97110 THERAPEUTIC EXERCISES: CPT

## 2022-06-21 NOTE — PROGRESS NOTES
"[facility]      Name: Angelic Ramos  Clinic Number: [id number]    Therapy Diagnosis:   Encounter Diagnoses   Name Primary?    Decreased ROM of lumbar spine Yes    Decreased strength of trunk and back     Impaired functional mobility, balance, gait, and endurance     Weakness of both lower extremities      Physician: Vicente Min MD    Visit Date: 6/21/2022    Physician Orders: PT Eval and Treat   Note: Patient was admitted for mental health issues and did not follow up.  He is doing much better and is excited for the chance to re-enroll.     Medical Diagnosis from Referral:   M54.50,G89.29 (ICD-10-CM) - Chronic bilateral low back pain without sciatica  M43.16 (ICD-10-CM) - Spondylolisthesis at L4-L5 level     Evaluation Date: 5/10/2022  Authorization Period Expiration: 04/28/2023  Plan of Care Expiration: 07/19/22 (10 weeks)  Visit # / Visits authorized: 7/20 (may not need 20, got 7 visits into HB late 2021)     CHANGED IN SYSTEM TO [email or web address] - GOES BY "MIHAELA" AND USES "HE/HIM"     Time In: 2:10 pm  Time Out: 3:10 pm  Total Billable Time: 50 minutes     Precautions: Standard s/p spondy and L4-5 fusion and TLIF (7/14/2020); Hx of depression; B knee pain; Psych. Reported seizure 2 months ago.      Pattern of pain determined: 1 PEN    Subjective   Pemberton reports having his usual mild lower back discomfort. States that he has been trying to be a little more consistent with HEP performance but LTR has definitely felt good all the time.    Patient reports tolerating previous visit well with no residual symptoms or issues  Patient reports their pain to be 2/10 on a 0-10 scale with 0 being no pain and 10 being the worst pain imaginable.  Pain Location: B low back and hips     Work and leisure: unemployed currently  Pt goals: Be more active with less pain, get in shape    Objective     Baseline Isometric Testing on Med X equipment: Testing administered by PT  Date of testing: " "05/10/22   ROM 0-36 deg   Max Peak Torque 174    Min Peak Torque 45    Flex/Ext Ratio 3.86:1   % below normative data 18     Outcomes: FOTO  Initial score: 51% limited  Visit 6 score: 50%  Goal: <40%    Treatment    Pt was instructed in and performed the following:     Angelic received therapeutic exercises to develop/improved posture, cardiovascular endurance, muscular endurance, lumbar/cervical ROM, strength and muscular endurance for 55 minutes including the following exercises:     LTR x 10,5"  Piriformis stretch 3 x 20 sec  Active HSS x 10, 5" ea  PPT + min elevation bridge c/pilates ring iso ABD x 15  PPT + min elevation bridge c/pilates ring iso ADD x 10  PPT with BKFO BTBx 15 each   EIS x 10    [facility] 6/21/2022   Visit Number 7   VAS Pain Rating 1   Treadmill Time (in min.) -   Time 5   Lumbar Stretches - Slouch -   Extension in Standing 10   Flexion in Lying 10   Lumbar Extension - Seat Pad -   Femur Restraint -   Top Dead Center -   Counterweight -   Lumbar Flexion -   Lumbar Extension -   Lumbar Peak Torque -   Lumbar Weight 69   Repetitions 20   Rating of Perceived Exertion 3         Peripheral muscle strengthening which included 1 set of 15-20 repetitions at a slow, controlled 10-13 second per rep pace focused on strengthening supporting musculature for improved body mechanics and functional mobility.  Pt and therapist focused on proper form during treatment to ensure optimal strengthening of each targeted muscle group.  Machines were utilized including torso rotation, leg extension, leg curl, chest press, upright row. Tricep extension, bicep curl, leg press, and hip abduction added visit 3    Angelic received the following manual therapy techniques: nil.     Home Exercises Provided and Patient Education Provided   Home exercises include: PPT, SKTC, LTR, SOC, Piriformis stretch  Cardio program: visit 5 (may remember from last time, see if he still has the handout)  Lifting education " date: visit 11  Lumbar roll: not yet purchased, can't afford    Education provided:   - Pacing and progression for lumbar MedX strengthening  - Need for more regular stretching and ice at home    Written Home Exercises Provided: Patient instructed to cont prior HEP.  Exercises were reviewed and Nilay was able to demonstrate them prior to the end of the session.  Pemberton demonstrated good  understanding of the education provided.     See EMR under Patient Instructions for exercises provided prior visit.    Assessment   Pemberton returns with report of slightly improved but still present back pain today. Treatment continued with lumbopelvic/core flexibility and strengthening ex's which he was able to perform without increased symptoms including progressed reps for bridging and BKFO. Good technique with progressions but still relatively limited tolerance, will benefit from gradual progression. Lumbar MedX ROM increased to 0-42 and resistance was maintained at 69 ft/lb and he was able to complete 20 reps with RPE = 3/10.  Will look to progress per HB protocol and patient tolerance.    Patient is making fair progress towards established goals.  Pt will continue to benefit from skilled outpatient physical therapy to address the deficits stated in the impairment chart, provide pt/family education and to maximize pt's level of independence in the home and community environment.     Anticipated Barriers for therapy: Transportation  Pt's spiritual, cultural and educational needs considered and pt agreeable to plan of care and goals as stated below:     GOALS: Pt is in agreement with the following goals.     Short term goals:  6 weeks or 10 visits   1.  Pt will demonstrate increased lumbar ROM by at least 3 degrees from the initial ROM value with improvements noted in functional ROM and ability to perform ADLs.  (approp and ongoing)  2.  Pt will demonstrate increased MedX average isometric strength value  by 10% from initial test  resulting in improved ability to perform bending, lifting, and carrying activities safely, confidently.  (approp and ongoing)  3.  Patient report a reduction in worst pain score by 1-2 points for improved tolerance for functional activities.  (approp and ongoing)  4.  Pt able to perform HEP correctly with minimal cueing or supervision from therapist to encourage independent management of symptoms. (approp and ongoing)        Long term goals: 10 weeks or 20 visits   1. Pt will demonstrate increased lumbar ROM by at least 6 degrees from initial ROM value, resulting in improved ability to perform functional fwd bending while standing and sitting. (approp and ongoing)  2. Pt will demonstrate increased MedX average isometric strength value  by 20% from initial test resulting in improved ability to perform bending, lifting, and carrying activities safely, confidently.  (approp and ongoing)  3. Pt to demonstrate ability to independently control and reduce their pain through posture positioning and mechanical movements throughout a typical day.  (approp and ongoing)  4.  Pt will demonstrate reduced pain and improved functional outcomes as reported on the FOTO by reaching a limitation score of < or = 20% or less in order to demonstrate subjective improvement in pt's condition.    (approp and ongoing)  5. Pt will demonstrate independence with the HEP at discharge  (approp and ongoing)  6. Pt will report able to tolerance standing or walking for more than 30 minutes without increasing LBP by more than 1-2 points form baseline at rest  (approp and ongoing)    Plan   Continue with established Plan of Care towards established PT goals.     Wiliam Kelly, PT,    6/21/2022

## 2022-06-23 ENCOUNTER — CLINICAL SUPPORT (OUTPATIENT)
Dept: REHABILITATION | Facility: OTHER | Age: 22
End: 2022-06-23
Attending: ORTHOPAEDIC SURGERY
Payer: MEDICAID

## 2022-06-23 DIAGNOSIS — R29.898 DECREASED STRENGTH OF TRUNK AND BACK: ICD-10-CM

## 2022-06-23 DIAGNOSIS — Z74.09 IMPAIRED FUNCTIONAL MOBILITY, BALANCE, GAIT, AND ENDURANCE: ICD-10-CM

## 2022-06-23 DIAGNOSIS — R29.898 WEAKNESS OF BOTH LOWER EXTREMITIES: ICD-10-CM

## 2022-06-23 DIAGNOSIS — M53.86 DECREASED ROM OF LUMBAR SPINE: Primary | ICD-10-CM

## 2022-06-23 PROCEDURE — 97110 THERAPEUTIC EXERCISES: CPT

## 2022-06-23 NOTE — PROGRESS NOTES
"[facility]      Name: Angelic Ramos  Clinic Number: [id number]    Therapy Diagnosis:   Encounter Diagnoses   Name Primary?    Decreased ROM of lumbar spine Yes    Decreased strength of trunk and back     Impaired functional mobility, balance, gait, and endurance     Weakness of both lower extremities      Physician: Vicente Min MD    Visit Date: 2022    Physician Orders: PT Eval and Treat   Note: Patient was admitted for mental health issues and did not follow up.  He is doing much better and is excited for the chance to re-enroll.     Medical Diagnosis from Referral:   M54.50,G89.29 (ICD-10-CM) - Chronic bilateral low back pain without sciatica  M43.16 (ICD-10-CM) - Spondylolisthesis at L4-L5 level     Evaluation Date: 5/10/2022  Authorization Period Expiration: 2023  Plan of Care Expiration: 22 (10 weeks)  Visit # / Visits authorized:      CHANGED IN SYSTEM TO [email or web address].BAN - GOES BY "MIHAELA" AND USES "HE/HIM"     Time In: 1245  Time Out: 1335  Total Billable Time: 50 minutes     Precautions: Standard s/p spondy and L4-5 fusion and TLIF (2020); Hx of depression; B knee pain; Psych. Reported seizure 2 months ago.      Pattern of pain determined: 1 PEN    Subjective   Pemberton returns to PT with c/o of 1-2/10 LBP; "it is good day today".       Patient reports tolerating previous visit well with no residual symptoms or issues  Patient reports their pain to be 1- 2/10 on a 0-10 scale with 0 being no pain and 10 being the worst pain imaginable.  Pain Location: B low back and hips     Work and leisure: unemployed currently  Pt goals: Be more active with less pain, get in shape    Objective     Baseline Isometric Testing on Med X equipment: Testing administered by PT  Date of testin/10/22   ROM 0-36 deg   Max Peak Torque 174    Min Peak Torque 45    Flex/Ext Ratio 3.86:1   % below normative data 18     Outcomes: FOTO  Initial score: 51% " "limited  Visit 6 score: 50%  Goal: <40%    Treatment    Pt was instructed in and performed the following:     Angelic received therapeutic exercises to develop/improved posture, cardiovascular endurance, muscular endurance, lumbar/cervical ROM, strength and muscular endurance for 50 minutes including the following exercises:     Aerobic Exercise: Treadmill for 5 minutes at 1/7 mph    Go To Stretches:  LTR x 10,5"  Piriformis stretch 3 x 20 sec  Active HSS x 10, 5" ea  PPT  with BKFO BTB x20   EIS x 10    PPT + min elevation bridge c/pilates ring iso ABD x 15  PPT + min elevation bridge c/pilates ring iso ADD x 10    [facility] 6/23/2022   Visit Number 8   VAS Pain Rating 2   Treadmill Time (in min.) 5   Speed 1.7   Extension in Standing 10   Lumbar Weight 72   Repetitions 15   Rating of Perceived Exertion 4   Ice - Z Lie (in min.) 5       Peripheral muscle strengthening which included 1 set of 15-20 repetitions at a slow, controlled 10-13 second per rep pace focused on strengthening supporting musculature for improved body mechanics and functional mobility.  Pt and therapist focused on proper form during treatment to ensure optimal strengthening of each targeted muscle group.  Machines were utilized including torso rotation, leg extension, leg curl, chest press, upright row. Tricep extension, bicep curl, leg press, and hip abduction added visit 3    Angelic received the following manual therapy techniques: nil.     Home Exercises Provided and Patient Education Provided   Home exercises include: PPT, SKTC, LTR, SOC, Piriformis stretch  Cardio program: visit 5 (may remember from last time, see if he still has the handout)  Lifting education date: visit 11  Lumbar roll: not yet purchased, can't afford    Education provided:   - Pacing and progression for lumbar MedX strengthening  - Need for more regular stretching and ice at home    Written Home Exercises Provided: Patient instructed to cont prior " HEP.  Exercises were reviewed and Nilay was able to demonstrate them prior to the end of the session.  Nilay demonstrated good  understanding of the education provided.     See EMR under Patient Instructions for exercises provided prior visit.    Assessment     Nilay returned to PT today with very minimal pain 1/-2/10. She completed go to stretches, that gave her best relieve today. She received a 5% weight increase on lumbar medx extension, to 72#. Nilay completed 15 reps at RPE of 4/10. Continue to progress per tolerance.     Patient is making fair progress towards established goals.  Pt will continue to benefit from skilled outpatient physical therapy to address the deficits stated in the impairment chart, provide pt/family education and to maximize pt's level of independence in the home and community environment.     Anticipated Barriers for therapy: Transportation  Pt's spiritual, cultural and educational needs considered and pt agreeable to plan of care and goals as stated below:     GOALS: Pt is in agreement with the following goals.     Short term goals:  6 weeks or 10 visits   1.  Pt will demonstrate increased lumbar ROM by at least 3 degrees from the initial ROM value with improvements noted in functional ROM and ability to perform ADLs.  (approp and ongoing)  2.  Pt will demonstrate increased MedX average isometric strength value  by 10% from initial test resulting in improved ability to perform bending, lifting, and carrying activities safely, confidently.  (approp and ongoing)  3.  Patient report a reduction in worst pain score by 1-2 points for improved tolerance for functional activities.  (approp and ongoing)  4.  Pt able to perform HEP correctly with minimal cueing or supervision from therapist to encourage independent management of symptoms. (approp and ongoing)     Long term goals: 10 weeks or 20 visits   1. Pt will demonstrate increased lumbar ROM by at least 6 degrees from initial ROM value,  resulting in improved ability to perform functional fwd bending while standing and sitting. (approp and ongoing)  2. Pt will demonstrate increased MedX average isometric strength value  by 20% from initial test resulting in improved ability to perform bending, lifting, and carrying activities safely, confidently.  (approp and ongoing)  3. Pt to demonstrate ability to independently control and reduce their pain through posture positioning and mechanical movements throughout a typical day.  (approp and ongoing)  4.  Pt will demonstrate reduced pain and improved functional outcomes as reported on the FOTO by reaching a limitation score of < or = 20% or less in order to demonstrate subjective improvement in pt's condition.    (approp and ongoing)  5. Pt will demonstrate independence with the HEP at discharge  (approp and ongoing)  6. Pt will report able to tolerance standing or walking for more than 30 minutes without increasing LBP by more than 1-2 points form baseline at rest  (approp and ongoing)    Plan   Continue with established Plan of Care towards established PT goals.     Radha Ferreira, PT,    6/23/2022

## 2022-06-27 ENCOUNTER — HOSPITAL ENCOUNTER (OUTPATIENT)
Dept: NEUROLOGY | Facility: CLINIC | Age: 22
Discharge: HOME | End: 2022-06-27
Payer: MEDICAID

## 2022-06-27 DIAGNOSIS — R56.9 SEIZURE-LIKE ACTIVITY: ICD-10-CM

## 2022-06-27 PROCEDURE — 95812 EEG 41-60 MINUTES: CPT | Mod: 26,S$PBB,, | Performed by: PSYCHIATRY & NEUROLOGY

## 2022-06-27 PROCEDURE — 95812 PR EEG,EXTENDED MONITORING,41-60 MINUTES: ICD-10-PCS | Mod: 26,S$PBB,, | Performed by: PSYCHIATRY & NEUROLOGY

## 2022-06-27 PROCEDURE — 95812 EEG 41-60 MINUTES: CPT | Mod: PBBFAC | Performed by: PSYCHIATRY & NEUROLOGY

## 2022-06-27 NOTE — PROCEDURES
EEG REPORT      Angelic Ramos  [id number]  2000    DATE OF SERVICE: 6/27/2022         METHODOLOGY      Extended electroencephalographic recording is made while the patient is ambulatory and continuing normal daily activities.  Electrodes are placed according to the International 10-20 placement system and included T1 and T2 electrode placement.  Twenty four (24) channels of digital signal (sampling rate of 512/sec) was simultaneously recorded from the scalp including EKG and eye monitors.  Recording band pass was 0.1 to 100 hz and all data was stored digitally on the recorder.  The patient is instructed to press an event button when clinical symptoms occur and write the symptoms into a diary. Activation procedures which include photic stimulation, hyperventilation and instructing patients to perform simple task are done in selected patients.        The EEG is displayed on a monitor screen and can be reformatted into different montages for evaluation.  The entire recoding is submitted for computer assisted analysis to detect spike and electrographic seizure activity.  The entire recording is visually reviewed and the times identified by computer analysis as being spikes or seizures are reviewed again.  Compresses spectral analysis (CSA) is also performed on the activity recorded from each individual channel.  This is displayed as a power display of frequencies from 0 to 30 Hz over time.   The CSA analysis is done and displayed continuously.  This is reviewed for asymmetries in power between homologous areas of the scalp and for presence of changes in power which canbe seen when seizures occur.  Sections of suspected abnormalities on the CSA is then compared with the original EEG recording.  .     [employer] software was also utilized in the review of this study.  This software suite analyzes the EEG recording in multiple domains.  Coherence and rhythmicity is computed to identify EEG sections which may  contain organized seizures.  Each channel undergoes analysis to detect presence of spike and sharp waves which have special and morphological characteristic of epileptic activity.  The routine EEG recording is converted from spacial into frequency domain.  This is then displayed comparing homologous areas to identify areas of significant asymmetry.  Algorithm to identify non-cortically generated artifact is used to separate eye movement, EMG and other artifact from the EEG     Recording Times    A total of 00:52:22 hours of EEG was recorded.      EEG FINDINGS:  Background activity:   The background rhythm was characterized by alpha and anterior dominant beta activity with a 10Hz posterior dominant alpha rhythm at 30-70 microvolts.   Symmetry and continuity: the background was continuous and symmetric     Sleep:   Normal sleep transients including sleep spindles, K complexes, vertex waves and POSTS were seen.    Activation procedures:   Photic stimulation was performed with no abnormalities seen  Hyperventilation was performed with no abnormalities seen    Abnormal activity:   No epileptiform discharges, periodic discharges, lateralized rhythmic delta activity or electrographic seizures were seen.    IMPRESSION:   Normal EEG of light sleep and the waking state.      Alce Jackman MD  Neurology-Epilepsy.  [facility].

## 2022-06-28 ENCOUNTER — CLINICAL SUPPORT (OUTPATIENT)
Dept: REHABILITATION | Facility: OTHER | Age: 22
End: 2022-06-28
Attending: ORTHOPAEDIC SURGERY
Payer: MEDICAID

## 2022-06-28 DIAGNOSIS — R29.898 WEAKNESS OF BOTH LOWER EXTREMITIES: ICD-10-CM

## 2022-06-28 DIAGNOSIS — Z74.09 IMPAIRED FUNCTIONAL MOBILITY, BALANCE, GAIT, AND ENDURANCE: ICD-10-CM

## 2022-06-28 DIAGNOSIS — M53.86 DECREASED ROM OF LUMBAR SPINE: Primary | ICD-10-CM

## 2022-06-28 DIAGNOSIS — R29.898 DECREASED STRENGTH OF TRUNK AND BACK: ICD-10-CM

## 2022-06-28 PROCEDURE — 97110 THERAPEUTIC EXERCISES: CPT | Mod: CQ

## 2022-06-28 NOTE — PROGRESS NOTES
"[facility]      Name: Angelic Ramos  Clinic Number: [id number]    Therapy Diagnosis:   Encounter Diagnoses   Name Primary?    Decreased ROM of lumbar spine Yes    Decreased strength of trunk and back     Impaired functional mobility, balance, gait, and endurance     Weakness of both lower extremities      Physician: Vicente Min MD    Visit Date: 2022    Physician Orders: PT Eval and Treat   Note: Patient was admitted for mental health issues and did not follow up.  He is doing much better and is excited for the chance to re-enroll.     Medical Diagnosis from Referral:   M54.50,G89.29 (ICD-10-CM) - Chronic bilateral low back pain without sciatica  M43.16 (ICD-10-CM) - Spondylolisthesis at L4-L5 level     Evaluation Date: 5/10/2022  Authorization Period Expiration: 2023  Plan of Care Expiration: 22 (10 weeks)  Visit # / Visits authorized:      CHANGED IN SYSTEM TO [email or web address] - GOES BY "MIHAELA" AND USES "HE/HIM"     Time In: 2:15 PM  Time Out: 3:10 PM  Total Billable Time: 50 minutes     Precautions: Standard s/p spondy and L4-5 fusion and TLIF (2020); Hx of depression; B knee pain; Psych. Reported seizure 2 months ago.      Pattern of pain determined: 1 PEN    Subjective   Pemberton returns with mild report of lower back pain. He states that the he still has some days where the pain is bad but states that it is less frequent and more manageable.    Patient reports tolerating previous visit well with no residual symptoms or issues  Patient reports their pain to be  2/10 on a 0-10 scale with 0 being no pain and 10 being the worst pain imaginable.  Pain Location: B low back and hips     Work and leisure: unemployed currently  Pt goals: Be more active with less pain, get in shape    Objective     Baseline Isometric Testing on Med X equipment: Testing administered by PT  Date of testin/10/22   ROM 0-36 deg   Max Peak Torque 174    Min Peak Torque 45 " "   Flex/Ext Ratio 3.86:1   % below normative data 18     Outcomes: FOTO  Initial score: 51% limited  Visit 6 score: 50%  Goal: <40%    Treatment    Pt was instructed in and performed the following:     Angelic received therapeutic exercises to develop/improved posture, cardiovascular endurance, muscular endurance, lumbar/cervical ROM, strength and muscular endurance for 50 minutes including the following exercises:     Aerobic Exercise: Treadmill for 5 minutes at 1/7 mph  Stationary bike level 3 x 5 minutes    LTR x 10,5"  Piriformis stretch 3 x 20 sec  Active HSS x 10, 5" ea  PPT  with BKFO BTB x20   +Bridging with BTB x 10  +PPT 90/90 Heel taps x 10 each  EIS x 10    [facility] Short 6/28/2022   Visit Number 9   VAS Pain Rating 2   Treadmill Time (in min.) -   Speed -   Time 5   Lumbar Stretches - Slouch -   Extension in Standing 10   Flexion in Lying -   Lumbar Extension - Seat Pad -   Femur Restraint -   Top Dead Center -   Counterweight -   Lumbar Flexion -   Lumbar Extension -   Lumbar Peak Torque -   Lumbar Weight 72   Repetitions 20   Rating of Perceived Exertion 5     Not performed  PPT + min elevation bridge c/pilates ring iso ABD x 15  PPT + min elevation bridge c/pilates ring iso ADD x 10    Peripheral muscle strengthening which included 1 set of 15-20 repetitions at a slow, controlled 10-13 second per rep pace focused on strengthening supporting musculature for improved body mechanics and functional mobility.  Pt and therapist focused on proper form during treatment to ensure optimal strengthening of each targeted muscle group.  Machines were utilized including torso rotation, leg extension, leg curl, chest press, upright row. Tricep extension, bicep curl, leg press, and hip abduction added visit 3    Angelic received the following manual therapy techniques: nil.     Home Exercises Provided and Patient Education Provided   Home exercises include: PPT, SKTC, LTR, SOC, Piriformis " stretch  Cardio program: visit 5 (may remember from last time, see if he still has the handout)  Lifting education date: visit 11  Lumbar roll: not yet purchased, can't afford    Education provided:   - Pacing and progression for lumbar MedX strengthening  - Need for more regular stretching and ice at home    Written Home Exercises Provided: Patient instructed to cont prior HEP.  Exercises were reviewed and Pemberton was able to demonstrate them prior to the end of the session.  Pemberton demonstrated good  understanding of the education provided.     See EMR under Patient Instructions for exercises provided prior visit.    Assessment   Pemberton returns with mild report of lower back pain.  Treatment continued with lumbopelvic/core flexibility and strengthening ex's which he was able to perform without increased back pain symptoms. States that the program has been helpful thus far and the pain is more manageable.  Lumbar MedX resistance was maintained at 72# completing 20 reps with a RPE = 5/10. Will look to progress per HB protocol and patient tolerance.    Patient is making progress towards established goals.  Pt will continue to benefit from skilled outpatient physical therapy to address the deficits stated in the impairment chart, provide pt/family education and to maximize pt's level of independence in the home and community environment.     Anticipated Barriers for therapy: Transportation  Pt's spiritual, cultural and educational needs considered and pt agreeable to plan of care and goals as stated below:     GOALS: Pt is in agreement with the following goals.     Short term goals:  6 weeks or 10 visits   1.  Pt will demonstrate increased lumbar ROM by at least 3 degrees from the initial ROM value with improvements noted in functional ROM and ability to perform ADLs.  (approp and ongoing)  2.  Pt will demonstrate increased MedX average isometric strength value  by 10% from initial test resulting in improved ability to  perform bending, lifting, and carrying activities safely, confidently.  (approp and ongoing)  3.  Patient report a reduction in worst pain score by 1-2 points for improved tolerance for functional activities.  (approp and ongoing)  4.  Pt able to perform HEP correctly with minimal cueing or supervision from therapist to encourage independent management of symptoms. (approp and ongoing)     Long term goals: 10 weeks or 20 visits   1. Pt will demonstrate increased lumbar ROM by at least 6 degrees from initial ROM value, resulting in improved ability to perform functional fwd bending while standing and sitting. (approp and ongoing)  2. Pt will demonstrate increased MedX average isometric strength value  by 20% from initial test resulting in improved ability to perform bending, lifting, and carrying activities safely, confidently.  (approp and ongoing)  3. Pt to demonstrate ability to independently control and reduce their pain through posture positioning and mechanical movements throughout a typical day.  (approp and ongoing)  4.  Pt will demonstrate reduced pain and improved functional outcomes as reported on the FOTO by reaching a limitation score of < or = 20% or less in order to demonstrate subjective improvement in pt's condition.    (approp and ongoing)  5. Pt will demonstrate independence with the HEP at discharge  (approp and ongoing)  6. Pt will report able to tolerance standing or walking for more than 30 minutes without increasing LBP by more than 1-2 points form baseline at rest  (approp and ongoing)    Plan   Continue with established Plan of Care towards established PT goals.     Leodan Williamson, PTA,    6/28/2022

## 2022-06-30 ENCOUNTER — PATIENT MESSAGE (OUTPATIENT)
Dept: REHABILITATION | Facility: OTHER | Age: 22
End: 2022-06-30
Payer: MEDICAID

## 2022-06-30 NOTE — PROGRESS NOTES
Health  Consult Note    Name: Angelic Ramos  Clinic Number: [id number]  Physician: Vicente Min MD  Past Medical History:   Diagnosis Date    Depression     Scoliosis     Spondylolysis     Urinary tract infection      Time In: 11:00 AM  Time Out: 11:50 AM    Health  Agreement signed: yes; 6/28/2022    Coaching performed performed: in person    Subjective:   Patient reports that he is feeling pretty good today. He is currently in school at [employer], resides in [place], and has interest in crafts (mainly subhash and painting). Pt lives with mother. He states that he's currently contributing to his wellness in the following ways: being more mindful about nutritional choices, getting outside more often, participating in HB program, walking when possible. Pt states that he is really wanting to get outside and active more in hopes that this would provide more motivation to go walking. He also has long-term weight-loss goals in preparation for top surgery. He is very understanding that this goal will take time. He currently is a member at [employer], but rarely works out; he would like to start working out more often and thinks that a written out exercise plan would help him get started. Pt has been meeting with a dietician and is exploring intuitive eating; he states that he's enjoyed it so far, since it's not restrictive and focuses on meal satisfaction rather than counting calories.     Vision:  Mentally the same, doing what's best for him in the moment, going to the gym and being active regularly    Values:     Strengths:      Challenges:      Support:  Mother, girlfriend, friends, dietician, gym membership, HB program, knowledge    Hobbies: crafts, art, subhash, pets          INITIAL date: provided on 6/28/2022  One a scale of 1-10, with 10 being 100% happy, how would you rate your happiness in each of the wellness areas below?    Happiness:         1     2     3     4     5     6     7     8      9     10    Initial Date: DC Date: +/- Total Change   Exercise/Movement      Physical Health      Stress Level      Nutrition      Sleep      Play      Body Image      Relationships      Energy/Vitality        Assessment:   Patient has an undeniably positive outlook on life and is ready to make realistic changes in his life. He is compassionate, resilient, and will benefit from accountability and goal-setting. He has a comprehensive understanding of taking small steps toward his goals.    Plan:  Patient goals for next consult include:    1. As a prompt to be outside more often, take Hany outside at every paulino ring.   2. Take a 5 minute walk every day, or every other day depending on pain levels.   3. Ask mom to get more low carb, low sugar, etc, foods in the house  4. Read more: during nightly routine, read for about 20 minutes every night    3-month Goals:   1. Workout at [employer] 2-3x/week  2. Better handle and grasp on well-rounded nutritional habits    Health : Selene Gonzalez  6/30/2022

## 2022-07-07 ENCOUNTER — CLINICAL SUPPORT (OUTPATIENT)
Dept: REHABILITATION | Facility: OTHER | Age: 22
End: 2022-07-07
Attending: ORTHOPAEDIC SURGERY
Payer: MEDICAID

## 2022-07-07 DIAGNOSIS — M53.86 DECREASED ROM OF LUMBAR SPINE: Primary | ICD-10-CM

## 2022-07-07 DIAGNOSIS — R29.898 DECREASED STRENGTH OF TRUNK AND BACK: ICD-10-CM

## 2022-07-07 DIAGNOSIS — Z74.09 IMPAIRED FUNCTIONAL MOBILITY, BALANCE, GAIT, AND ENDURANCE: ICD-10-CM

## 2022-07-07 DIAGNOSIS — R29.898 WEAKNESS OF BOTH LOWER EXTREMITIES: ICD-10-CM

## 2022-07-07 PROCEDURE — 97110 THERAPEUTIC EXERCISES: CPT

## 2022-07-07 NOTE — PROGRESS NOTES
"[facility]      Name: Angelic Ramos  Clinic Number: [id number]    Therapy Diagnosis:   Encounter Diagnoses   Name Primary?    Decreased ROM of lumbar spine Yes    Decreased strength of trunk and back     Impaired functional mobility, balance, gait, and endurance     Weakness of both lower extremities      Physician: Vicente Min MD    Visit Date: 2022    Physician Orders: PT Eval and Treat      Medical Diagnosis from Referral:   M54.50,G89.29 (ICD-10-CM) - Chronic bilateral low back pain without sciatica  M43.16 (ICD-10-CM) - Spondylolisthesis at L4-L5 level     Evaluation Date: 5/10/2022  Authorization Period Expiration: 2023  Plan of Care Expiration: 22 (10 weeks)  Visit # / Visits authorized: 10/20     CHANGED IN SYSTEM TO [email or web address].BAN - GOES BY "MIHAELA" AND USES "HE/HIM"     Time In: 2:45 PM  Time Out: 3:30 PM  Total Billable Time: 45 minutes     Precautions: Standard s/p spondy and L4-5 fusion and TLIF (2020); Hx of depression; B knee pain; Psych. Reported seizure 2 months ago.      Pattern of pain determined: 1 PEN    Subjective   Pemberton returns with continued low level of lower back pain. He states that he still has some days where the pain is bad but states that it is less frequent and more manageable. Definitely notices the problem the most with prolonged standing and has a hard time being on his feet for more than 10-15 minutes without needing a break or position change of some sort.    Patient reports tolerating previous visit well with no residual symptoms or issues  Patient reports their pain to be  2/10 on a 0-10 scale with 0 being no pain and 10 being the worst pain imaginable.  Pain Location: B low back and hips     Work and leisure: unemployed currently  Pt goals: Be more active with less pain, get in shape    Objective     Baseline Isometric Testing on Med X equipment: Testing administered by PT  Date of testin/10/22   ROM 0-36 " "deg   Max Peak Torque 174    Min Peak Torque 45    Flex/Ext Ratio 3.86:1   % below normative data 18     Baseline Isometric Testing on Med X equipment: Testing administered by PT  Date of testin22   ROM 0-42 deg   Max Peak Torque 162    Min Peak Torque 66    Flex/Ext Ratio 2.5:1   % below normative data  % gain from initial 8  32   *32% below at 0 degrees    Outcomes: FOTO  Initial score: 51% limited  Visit 6 score: 50%  Visit 10: 44%  Goal: <40%    Treatment    Pt was instructed in and performed the following:     Angelic received therapeutic exercises to develop/improved posture, cardiovascular endurance, muscular endurance, lumbar/cervical ROM, strength and muscular endurance for 50 minutes including the following exercises:     Aerobic Exercise: Stationary bike level 3 x 5 minutes    LTR x 10,5"  Piriformis stretch 3 x 20 sec  Active HSS x 10, 5" ea  EIS x 10    [facility] 2022   Visit Number 10   VAS Pain Rating 2   Treadmill Time (in min.) -   Speed -   Time 5   Lumbar Stretches - Slouch Overcorrection -   Extension in Standing 10   Flexion in Lying 10   Lumbar Extension Seat Pad -   Femur Restraint -   Top Dead Center -   Counterweight -   Lumbar Flexion 42   Lumbar Extension 0   Lumbar Peak Torque 162   Min Torque 66   Test Percent Below Normative Data 8   Test Percent Gain in Strength from Initial  32   Lumbar Weight -   Repetitions -   Rating of Perceived Exertion -   Ice - Z Lie (in min.) 5         Not performed 2022:   PPT + min elevation bridge c/pilates ring iso ABD x 15  PPT + min elevation bridge c/pilates ring iso ADD x 10  PPT  with BKFO BTB x20   +Bridging with BTB x 10  +PPT 90/90 Heel taps x 10 each    Peripheral muscle strengthening which included 1 set of 15-20 repetitions at a slow, controlled 10-13 second per rep pace focused on strengthening supporting musculature for improved body mechanics and functional mobility.  Pt and therapist focused on proper form during " treatment to ensure optimal strengthening of each targeted muscle group.  Machines were utilized including torso rotation, leg extension, leg curl, chest press, upright row. Tricep extension, bicep curl, leg press, and hip abduction added visit 3    Angelic received the following manual therapy techniques: nil.     Home Exercises Provided and Patient Education Provided   Home exercises include: PPT, SKTC, LTR, SOC, Piriformis stretch  Cardio program: visit 5 (may remember from last time, see if he still has the handout)  Lifting education date: visit 11  Lumbar roll: not yet purchased, can't afford    Education provided:   - Midpoint MedX testing and results    Written Home Exercises Provided: Patient instructed to cont prior HEP.  Exercises were reviewed and Nilay was able to demonstrate them prior to the end of the session.  Pemberton demonstrated good  understanding of the education provided.     See EMR under Patient Instructions for exercises provided prior visit.    Assessment   Patient has attended 10 visits at [facility] which included MD evaluation, PT evaluation with isometric testing, and physical therapy treatment including HEP instruction, education, aerobic work, dynamic strengthening on med ex equipment for the spine, and whole body strengthening on med ex equipment with increasing weight loads.  Patient  is demonstrating increased ability to reduce symptoms, improved posture, improved   ROM, and improved   strength as follows:    -Improved posture,   better using lumbar roll  -Improved lumbar ROM,  initially on med ex test 0-36 and   currently 0-42  -Improved strength at each test point on lumbar med ex IM test with   32% average improvement noted with Reduced pain  Noted by patient  -Initial outcome tool score 49 and current outcome tool score  56 indicating reduced pain and improved function          Patient is making progress towards established goals.  Pt will continue to benefit from skilled  outpatient physical therapy to address the deficits stated in the impairment chart, provide pt/family education and to maximize pt's level of independence in the home and community environment.     Anticipated Barriers for therapy: Transportation  Pt's spiritual, cultural and educational needs considered and pt agreeable to plan of care and goals as stated below:     GOALS: Pt is in agreement with the following goals.     Short term goals:  6 weeks or 10 visits   1.  Pt will demonstrate increased lumbar ROM by at least 3 degrees from the initial ROM value with improvements noted in functional ROM and ability to perform ADLs.  (MET)  2.  Pt will demonstrate increased MedX average isometric strength value  by 10% from initial test resulting in improved ability to perform bending, lifting, and carrying activities safely, confidently.  (MET)  3.  Patient report a reduction in worst pain score by 1-2 points for improved tolerance for functional activities.  (MET)  4.  Pt able to perform HEP correctly with minimal cueing or supervision from therapist to encourage independent management of symptoms. (MET)     Long term goals: 10 weeks or 20 visits   1. Pt will demonstrate increased lumbar ROM by at least 6 degrees from initial ROM value, resulting in improved ability to perform functional fwd bending while standing and sitting. (MET)   1a. Pt will demonstrate increased lumbar ROM by at least 3 degrees from midpoint ROM value, resulting in improved ability to perform functional fwd bending while standing and sitting. (MET)  2. Pt will demonstrate increased MedX average isometric strength value  by 20% from initial test resulting in improved ability to perform bending, lifting, and carrying activities safely, confidently.  (MET)   2a. Pt will demonstrate increased MedX average isometric strength value  by 20% from midpoint test resulting in improved ability to perform bending, lifting, and carrying activities safely,  confidently.  (MET)  3. Pt to demonstrate ability to independently control and reduce their pain through posture positioning and mechanical movements throughout a typical day.  (approp and ongoing)  4.  Pt will demonstrate reduced pain and improved functional outcomes as reported on the FOTO by reaching a limitation score of < or = 20% or less in order to demonstrate subjective improvement in pt's condition.    (approp and ongoing)  5. Pt will demonstrate independence with the HEP at discharge  (approp and ongoing)  6. Pt will report able to tolerance standing or walking for more than 30 minutes without increasing LBP by more than 1-2 points form baseline at rest  (approp and ongoing)    Plan   Continue with established Plan of Care towards established PT goals.     Wiliam Kelly, PT,    7/7/2022

## 2022-07-14 ENCOUNTER — CLINICAL SUPPORT (OUTPATIENT)
Dept: REHABILITATION | Facility: OTHER | Age: 22
End: 2022-07-14
Attending: ORTHOPAEDIC SURGERY
Payer: MEDICAID

## 2022-07-14 DIAGNOSIS — R29.898 DECREASED STRENGTH OF TRUNK AND BACK: ICD-10-CM

## 2022-07-14 DIAGNOSIS — M53.86 DECREASED ROM OF LUMBAR SPINE: Primary | ICD-10-CM

## 2022-07-14 PROCEDURE — 97110 THERAPEUTIC EXERCISES: CPT

## 2022-07-14 NOTE — PROGRESS NOTES
"[facility]      Name: Angelic Ramos  Clinic Number: [id number]    Therapy Diagnosis:   No diagnosis found.  Physician: Vicente Min MD    Visit Date: 2022    Physician Orders: PT Eval and Treat      Medical Diagnosis from Referral:   M54.50,G89.29 (ICD-10-CM) - Chronic bilateral low back pain without sciatica  M43.16 (ICD-10-CM) - Spondylolisthesis at L4-L5 level     Evaluation Date: 5/10/2022  Authorization Period Expiration: 2023  UPDATED Plan of Care Expiration: 22  Visit # / Visits authorized:      CHANGED IN SYSTEM TO [email or web address] - GOES BY "MIHAELA" AND USES "HE/HIM"     Time In: 2:45 PM  Time Out: 3:40 PM  Total Billable Time: 50 minutes     Precautions: Standard s/p spondy and L4-5 fusion and TLIF (2020); Hx of depression; B knee pain; Psych. Reported seizure 2 months ago.      Pattern of pain determined: 1 PEN    Subjective   Pemberton returns with continued low level of lower back pain. 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     Patient reports tolerating previous visit well with no residual symptoms or issues  Patient reports their pain to be  2/10 on a 0-10 scale with 0 being no pain and 10 being the worst pain imaginable.  Pain Location: B low back and hips     Work and leisure: unemployed currently  Pt goals: Be more active with less pain, get in shape    Objective     Baseline Isometric Testing on Med X equipment: Testing administered by PT  Date of testin/10/22   ROM 0-36 deg   Max Peak Torque 174    Min Peak Torque 45    Flex/Ext Ratio 3.86:1   % below normative data 18     Baseline Isometric Testing on Med X equipment: Testing administered by PT  Date of testin22   ROM 0-42 " "deg   Max Peak Torque 162    Min Peak Torque 66    Flex/Ext Ratio 2.5:1   % below normative data  % gain from initial 8  32   *32% below at 0 degrees    Outcomes: FOTO  Initial score: 51% limited  Visit 6 score: 50%  Visit 10: 44%  Goal: <40%    Treatment    Pt was instructed in and performed the following:     Angelic received therapeutic exercises to develop/improved posture, cardiovascular endurance, muscular endurance, lumbar/cervical ROM, strength and muscular endurance for 50 minutes including the following exercises:     Aerobic Exercise: Stationary bike level 3 x 5 minutes    LTR x 10,5"  Piriformis stretch 3 x 20 sec  Active HSS x 10, 5" ea  EIS x 10  Lifting education:   Hip hinge x 15   Golfer's lift 2x10 ea    [facility] - Short 7/14/2022   Visit Number 11   VAS Pain Rating 2   Treadmill Time (in min.) -   Speed -   Time 5   Lumbar Stretches - Slouch -   Extension in Standing 10   Flexion in Lying 10   Lumbar Extension - Seat Pad -   Femur Restraint -   Top Dead Center -   Counterweight -   Lumbar Flexion -   Lumbar Extension -   Lumbar Peak Torque -   Lumbar Weight 76   Repetitions 15   Rating of Perceived Exertion 4         Not performed 7/7/2022:   PPT + min elevation bridge c/pilates ring iso ABD x 15  PPT + min elevation bridge c/pilates ring iso ADD x 10  PPT  with BKFO BTB x20   +Bridging with BTB x 10  +PPT 90/90 Heel taps x 10 each    Peripheral muscle strengthening which included 1 set of 15-20 repetitions at a slow, controlled 10-13 second per rep pace focused on strengthening supporting musculature for improved body mechanics and functional mobility.  Pt and therapist focused on proper form during treatment to ensure optimal strengthening of each targeted muscle group.  Machines were utilized including torso rotation, leg extension, leg curl, chest press, upright row. Tricep extension, bicep curl, leg press, and hip abduction added visit 3    Angelic received the following manual " therapy techniques: nil.     Home Exercises Provided and Patient Education Provided   Home exercises include: PPT, SKTC, LTR, SOC, Piriformis stretch  Cardio program: visit 5 (may remember from last time, see if he still has the handout)  Lifting education date: visit 11  Lumbar roll: not yet purchased, can't afford    Education provided:   - Pacing and progression of MedX strengthening  - Do's and Don'ts of Lifting (including handout)    Written Home Exercises Provided: Patient instructed to cont prior HEP.  Exercises were reviewed and Nilay was able to demonstrate them prior to the end of the session.  Nilay demonstrated good  understanding of the education provided.     See EMR under Patient Instructions for exercises provided prior visit.    Assessment   Nilay returns to therapy feeling good, pain is ok today but has been variant and a bit higher this week due to time spent helping with his girlfriend's move. Included lifting education and he is looking forward to trying these new techniques to see if they can make doing the laundry less strenuous and maybe decreasing his overall pain. He was able to resume MedX strengthening at an increased resistance of 76 ft/lb and completed 15 reps with a final RPE of 4/10. Will continue to progress per HB protocol and patient tolerance.        Patient is making progress towards established goals.  Pt will continue to benefit from skilled outpatient physical therapy to address the deficits stated in the impairment chart, provide pt/family education and to maximize pt's level of independence in the home and community environment.     Anticipated Barriers for therapy: Transportation  Pt's spiritual, cultural and educational needs considered and pt agreeable to plan of care and goals as stated below:     GOALS: Pt is in agreement with the following goals.     Short term goals:  6 weeks or 10 visits   1.  Pt will demonstrate increased lumbar ROM by at least 3 degrees from the  initial ROM value with improvements noted in functional ROM and ability to perform ADLs.  (MET)  2.  Pt will demonstrate increased MedX average isometric strength value  by 10% from initial test resulting in improved ability to perform bending, lifting, and carrying activities safely, confidently.  (MET)  3.  Patient report a reduction in worst pain score by 1-2 points for improved tolerance for functional activities.  (MET)  4.  Pt able to perform HEP correctly with minimal cueing or supervision from therapist to encourage independent management of symptoms. (MET)     Long term goals: 10 weeks or 20 visits   1. Pt will demonstrate increased lumbar ROM by at least 6 degrees from initial ROM value, resulting in improved ability to perform functional fwd bending while standing and sitting. (MET)   1a. Pt will demonstrate increased lumbar ROM by at least 3 degrees from midpoint ROM value, resulting in improved ability to perform functional fwd bending while standing and sitting. (MET)  2. Pt will demonstrate increased MedX average isometric strength value  by 20% from initial test resulting in improved ability to perform bending, lifting, and carrying activities safely, confidently.  (MET)   2a. Pt will demonstrate increased MedX average isometric strength value  by 20% from midpoint test resulting in improved ability to perform bending, lifting, and carrying activities safely, confidently.  (MET)  3. Pt to demonstrate ability to independently control and reduce their pain through posture positioning and mechanical movements throughout a typical day.  (approp and ongoing)  4.  Pt will demonstrate reduced pain and improved functional outcomes as reported on the FOTO by reaching a limitation score of < or = 20% or less in order to demonstrate subjective improvement in pt's condition.    (approp and ongoing)  5. Pt will demonstrate independence with the HEP at discharge  (approp and ongoing)  6. Pt will report able to  tolerance standing or walking for more than 30 minutes without increasing LBP by more than 1-2 points form baseline at rest  (approp and ongoing)    Plan   Continue with established Plan of Care towards established PT goals; extend POC through 8/31/2022 in order to complete full 20-visit [facility] program.     Wiliam Kelly, PT,    7/14/2022

## 2022-07-19 ENCOUNTER — CLINICAL SUPPORT (OUTPATIENT)
Dept: REHABILITATION | Facility: OTHER | Age: 22
End: 2022-07-19
Attending: ORTHOPAEDIC SURGERY
Payer: MEDICAID

## 2022-07-19 DIAGNOSIS — R29.898 DECREASED STRENGTH OF TRUNK AND BACK: ICD-10-CM

## 2022-07-19 DIAGNOSIS — M53.86 DECREASED ROM OF LUMBAR SPINE: Primary | ICD-10-CM

## 2022-07-19 DIAGNOSIS — Z74.09 IMPAIRED FUNCTIONAL MOBILITY, BALANCE, GAIT, AND ENDURANCE: ICD-10-CM

## 2022-07-19 DIAGNOSIS — R29.898 WEAKNESS OF BOTH LOWER EXTREMITIES: ICD-10-CM

## 2022-07-19 PROCEDURE — 97110 THERAPEUTIC EXERCISES: CPT | Mod: CQ

## 2022-07-19 NOTE — PROGRESS NOTES
"[facility]      Name: Angelic Ramos  Clinic Number: [id number]    Therapy Diagnosis:   Encounter Diagnoses   Name Primary?    Decreased ROM of lumbar spine Yes    Decreased strength of trunk and back     Impaired functional mobility, balance, gait, and endurance     Weakness of both lower extremities      Physician: Vicente Min MD    Visit Date: 2022    Physician Orders: PT Eval and Treat      Medical Diagnosis from Referral:   M54.50,G89.29 (ICD-10-CM) - Chronic bilateral low back pain without sciatica  M43.16 (ICD-10-CM) - Spondylolisthesis at L4-L5 level     Evaluation Date: 5/10/2022  Authorization Period Expiration: 2023  UPDATED Plan of Care Expiration: 22  Visit # / Visits authorized:      CHANGED IN SYSTEM TO [email or web address] - GOES BY "MIHAELA" AND USES "HE/HIM"     Time In: 2:40 PM  Time Out:3:35 PM  Total Billable Time: 50 minutes     Precautions: Standard s/p spondy and L4-5 fusion and TLIF (2020); Hx of depression; B knee pain; Psych. Reported seizure 2 months ago.      Pattern of pain determined: 1 PEN    Subjective   Pemberton returns with mild report of lower back pain. He states that he walked to the clinic today from his girlfriend's new apartment which was about a 5 minute walk.     Patient reports tolerating previous visit well with no residual symptoms or issues  Patient reports their pain to be  2/10 on a 0-10 scale with 0 being no pain and 10 being the worst pain imaginable.  Pain Location: B low back and hips     Work and leisure: unemployed currently  Pt goals: Be more active with less pain, get in shape    Objective     Baseline Isometric Testing on Med X equipment: Testing administered by PT  Date of testin/10/22   ROM 0-36 deg   Max Peak Torque 174    Min Peak Torque 45    Flex/Ext Ratio 3.86:1   % below normative data 18     Baseline Isometric Testing on Med X equipment: Testing administered by PT  Date of testing: " "07/7/22   ROM 0-42 deg   Max Peak Torque 162    Min Peak Torque 66    Flex/Ext Ratio 2.5:1   % below normative data  % gain from initial 8  32   *32% below at 0 degrees    Outcomes: FOTO  Initial score: 51% limited  Visit 6 score: 50%  Visit 10: 44%  Goal: <40%    Treatment    Pt was instructed in and performed the following:     Angelic received therapeutic exercises to develop/improved posture, cardiovascular endurance, muscular endurance, lumbar/cervical ROM, strength and muscular endurance for 50 minutes including the following exercises:     Aerobic Exercise: Stationary bike level 3 x 5 minutes--NP    LTR x 10,5"  Piriformis stretch 3 x 20 sec  Active HSS x 10, 5" ea  Bridging with BTB x 10  PPT  with BKFO BTB x20   +TrA (Tball) SLR  X 10   EIS x 10    [facility] - Short 7/19/2022   Visit Number 12   VAS Pain Rating 2   Treadmill Time (in min.) -   Speed -   Time -   Lumbar Stretches - Slouch -   Extension in Standing 10   Flexion in Lying 10   Lumbar Extension - Seat Pad -   Femur Restraint -   Top Dead Center -   Counterweight -   Lumbar Flexion -   Lumbar Extension -   Lumbar Peak Torque -   Lumbar Weight 76   Repetitions 18   Rating of Perceived Exertion 5     Not performed 7/7/2022:   PPT + min elevation bridge c/pilates ring iso ABD x 15  PPT + min elevation bridge c/pilates ring iso ADD x 10  PPT 90/90 Heel taps x 10 each   Lifting education:   Hip hinge x 15   Golfer's lift 2x10 ea    Peripheral muscle strengthening which included 1 set of 15-20 repetitions at a slow, controlled 10-13 second per rep pace focused on strengthening supporting musculature for improved body mechanics and functional mobility.  Pt and therapist focused on proper form during treatment to ensure optimal strengthening of each targeted muscle group.  Machines were utilized including torso rotation, leg extension, leg curl, chest press, upright row. Tricep extension, bicep curl, leg press, and hip abduction added visit " 3    Angelic received the following manual therapy techniques: nil.     Home Exercises Provided and Patient Education Provided   Home exercises include: PPT, SKTC, LTR, SOC, Piriformis stretch  Cardio program: visit 5 (may remember from last time, see if he still has the handout)  Lifting education date: visit 11  Lumbar roll: not yet purchased, can't afford    Education provided:   - Pacing and progression of MedX strengthening  - Do's and Don'ts of Lifting (including handout)    Written Home Exercises Provided: Patient instructed to cont prior HEP.  Exercises were reviewed and Nilay was able to demonstrate them prior to the end of the session.  Nilay demonstrated good  understanding of the education provided.     See EMR under Patient Instructions for exercises provided prior visit.    Assessment   Nilay returns with mild report of back pain. He states that he was able to get his cardio ex in by ambulating to the clinic today.  Treatment continued with lumbopelvic/core flexibility and strengthening ex's performing within tolerable muscular fatigue and without increased pain including the addition of TrA w/SLR. Lumbar MedX resistance was maintained at 76ft/lbs progressing to complete 18 reps with a RPE =5/10.  Will look to progress per HB protocol and patient tolerance.     Patient is making progress towards established goals.  Pt will continue to benefit from skilled outpatient physical therapy to address the deficits stated in the impairment chart, provide pt/family education and to maximize pt's level of independence in the home and community environment.     Anticipated Barriers for therapy: Transportation  Pt's spiritual, cultural and educational needs considered and pt agreeable to plan of care and goals as stated below:     GOALS: Pt is in agreement with the following goals.     Short term goals:  6 weeks or 10 visits   1.  Pt will demonstrate increased lumbar ROM by at least 3 degrees from the initial ROM  value with improvements noted in functional ROM and ability to perform ADLs.  (MET)  2.  Pt will demonstrate increased MedX average isometric strength value  by 10% from initial test resulting in improved ability to perform bending, lifting, and carrying activities safely, confidently.  (MET)  3.  Patient report a reduction in worst pain score by 1-2 points for improved tolerance for functional activities.  (MET)  4.  Pt able to perform HEP correctly with minimal cueing or supervision from therapist to encourage independent management of symptoms. (MET)     Long term goals: 10 weeks or 20 visits   1. Pt will demonstrate increased lumbar ROM by at least 6 degrees from initial ROM value, resulting in improved ability to perform functional fwd bending while standing and sitting. (MET)   1a. Pt will demonstrate increased lumbar ROM by at least 3 degrees from midpoint ROM value, resulting in improved ability to perform functional fwd bending while standing and sitting. (MET)  2. Pt will demonstrate increased MedX average isometric strength value  by 20% from initial test resulting in improved ability to perform bending, lifting, and carrying activities safely, confidently.  (MET)   2a. Pt will demonstrate increased MedX average isometric strength value  by 20% from midpoint test resulting in improved ability to perform bending, lifting, and carrying activities safely, confidently.  (MET)  3. Pt to demonstrate ability to independently control and reduce their pain through posture positioning and mechanical movements throughout a typical day.  (approp and ongoing)  4.  Pt will demonstrate reduced pain and improved functional outcomes as reported on the FOTO by reaching a limitation score of < or = 20% or less in order to demonstrate subjective improvement in pt's condition.    (approp and ongoing)  5. Pt will demonstrate independence with the HEP at discharge  (approp and ongoing)  6. Pt will report able to tolerance  standing or walking for more than 30 minutes without increasing LBP by more than 1-2 points form baseline at rest  (approp and ongoing)    Plan   Continue with established Plan of Care towards established PT goals.    Leodan Williamson, PTA,    7/19/2022

## 2022-07-22 ENCOUNTER — HOSPITAL ENCOUNTER (OUTPATIENT)
Dept: RADIOLOGY | Facility: HOSPITAL | Age: 22
Discharge: HOME | End: 2022-07-22
Attending: PSYCHIATRY & NEUROLOGY
Payer: MEDICAID

## 2022-07-22 DIAGNOSIS — R56.9 SEIZURE-LIKE ACTIVITY: ICD-10-CM

## 2022-07-22 PROCEDURE — 70551 MRI BRAIN EPILEPSY WITHOUT CONTRAST: ICD-10-PCS | Mod: 26,,, | Performed by: RADIOLOGY

## 2022-07-22 PROCEDURE — 70551 MRI BRAIN STEM W/O DYE: CPT | Mod: TC

## 2022-07-22 PROCEDURE — 70551 MRI BRAIN STEM W/O DYE: CPT | Mod: 26,,, | Performed by: RADIOLOGY

## 2022-07-26 ENCOUNTER — CLINICAL SUPPORT (OUTPATIENT)
Dept: REHABILITATION | Facility: OTHER | Age: 22
End: 2022-07-26
Attending: ORTHOPAEDIC SURGERY
Payer: MEDICAID

## 2022-07-26 DIAGNOSIS — R29.898 WEAKNESS OF BOTH LOWER EXTREMITIES: ICD-10-CM

## 2022-07-26 DIAGNOSIS — M53.86 DECREASED ROM OF LUMBAR SPINE: Primary | ICD-10-CM

## 2022-07-26 DIAGNOSIS — Z74.09 IMPAIRED FUNCTIONAL MOBILITY, BALANCE, GAIT, AND ENDURANCE: ICD-10-CM

## 2022-07-26 DIAGNOSIS — R29.898 DECREASED STRENGTH OF TRUNK AND BACK: ICD-10-CM

## 2022-07-26 PROCEDURE — 97110 THERAPEUTIC EXERCISES: CPT | Mod: CQ

## 2022-07-26 NOTE — PROGRESS NOTES
"[facility]      Name: Angelic Ramos  Clinic Number: [id number]    Therapy Diagnosis:   Encounter Diagnoses   Name Primary?    Decreased ROM of lumbar spine Yes    Decreased strength of trunk and back     Impaired functional mobility, balance, gait, and endurance     Weakness of both lower extremities      Physician: Vicente Min MD    Visit Date: 2022    Physician Orders: PT Eval and Treat      Medical Diagnosis from Referral:   M54.50,G89.29 (ICD-10-CM) - Chronic bilateral low back pain without sciatica  M43.16 (ICD-10-CM) - Spondylolisthesis at L4-L5 level     Evaluation Date: 5/10/2022  Authorization Period Expiration: 2023  UPDATED Plan of Care Expiration: 22  Visit # / Visits authorized:      CHANGED IN SYSTEM TO [email or web address] - GOES BY "MIHAELA" AND USES "HE/HIM"     Time In: 3:15 PM  Time Out: 4:05 PM  Total Billable Time: 45 minutes     Precautions: Standard s/p spondy and L4-5 fusion and TLIF (2020); Hx of depression; B knee pain; Psych. Reported seizure 2 months ago.      Pattern of pain determined: 1 PEN    Subjective   Pemberton reports "I have been having more days with more pain" He reports the pain  still comes and goes, with most of the pain coming in the morning when he wakes up.  He experienced "sharp" thoracic pain while walking to clinic today.    Patient reports tolerating previous visit well with no residual symptoms or issues  Patient reports their pain to be  4/10 on a 0-10 scale with 0 being no pain and 10 being the worst pain imaginable.  Pain Location: B low back and hips     Work and leisure: unemployed currently  Pt goals: Be more active with less pain, get in shape    Objective     Baseline Isometric Testing on Med X equipment: Testing administered by PT  Date of testin/10/22   ROM 0-36 deg   Max Peak Torque 174    Min Peak Torque 45    Flex/Ext Ratio 3.86:1   % below normative data 18     Baseline Isometric Testing " "on Med X equipment: Testing administered by PT  Date of testin22   ROM 0-42 deg   Max Peak Torque 162    Min Peak Torque 66    Flex/Ext Ratio 2.5:1   % below normative data  % gain from initial 8  32   *32% below at 0 degrees    Outcomes: FOTO  Initial score: 51% limited  Visit 6 score: 50%  Visit 10: 44%  Goal: <40%    Treatment    Pt was instructed in and performed the following:     Angelic received therapeutic exercises to develop/improved posture, cardiovascular endurance, muscular endurance, lumbar/cervical ROM, strength and muscular endurance for 45 minutes including the following exercises:     Aerobic Exercise: Stationary bike level 3 x 5 minutes--NP             Treadmill 1.7 mph for 5 minutes    LTR x 10,5"  Piriformis stretch 3 x 20 sec  Active HSS x 10, 5" ea  Bridging with BTB x 10  PPT  with BKFO BTB x20   TrA (Tball) SLR  X 10   Cat cow x10  EIS x 10    [facility] 2022   Visit Number 13   VAS Pain Rating 4   Treadmill Time (in min.) 5   Speed -   Time -   Lumbar Stretches - Slouch -   Extension in Standing 10   Flexion in Lying 10   Lumbar Extension - Seat Pad -   Femur Restraint -   Top Dead Center -   Counterweight -   Lumbar Flexion -   Lumbar Extension -   Lumbar Peak Torque -   Lumbar Weight 76   Repetitions 20   Rating of Perceived Exertion 4         Not performed 2022:   PPT + min elevation bridge c/pilates ring iso ABD x 15  PPT + min elevation bridge c/pilates ring iso ADD x 10  PPT 90/90 Heel taps x 10 each   Lifting education:   Hip hinge x 15   Golfer's lift 2x10 ea    Peripheral muscle strengthening which included 1 set of 15-20 repetitions at a slow, controlled 10-13 second per rep pace focused on strengthening supporting musculature for improved body mechanics and functional mobility.  Pt and therapist focused on proper form during treatment to ensure optimal strengthening of each targeted muscle group.  Machines were utilized including torso rotation, " leg extension, leg curl, chest press, upright row. Tricep extension, bicep curl, leg press, and hip abduction added visit 3    Angelic received the following manual therapy techniques: nil.     Home Exercises Provided and Patient Education Provided   Home exercises include: PPT, SKTC, LTR, SOC, Piriformis stretch  Cardio program: visit 5 (may remember from last time, see if he still has the handout)  Lifting education date: visit 11  Lumbar roll: not yet purchased, can't afford    Education provided:   - Pacing and progression of MedX strengthening  - Do's and Don'ts of Lifting (including handout)    Written Home Exercises Provided: Patient instructed to cont prior HEP.  Exercises were reviewed and Nilay was able to demonstrate them prior to the end of the session.  Pemberton demonstrated good  understanding of the education provided.     See EMR under Patient Instructions for exercises provided prior visit.    Assessment   Pemberton returned reporting increased lower back pain and reports experiencing thoracic pain while walking to today's visit.  Treatment progressed by initiating cat cow to address pt's report of thoracic muscle tightness/pain.  He tolerated today's treatment well with no increase in pain.  Medx resistance remained at 76#.  He completed 20 reps at a RPE of 4/10.  Consider a 5% increase in resistance NV per HB protocol.      Patient is making progress towards established goals.  Pt will continue to benefit from skilled outpatient physical therapy to address the deficits stated in the impairment chart, provide pt/family education and to maximize pt's level of independence in the home and community environment.     Anticipated Barriers for therapy: Transportation  Pt's spiritual, cultural and educational needs considered and pt agreeable to plan of care and goals as stated below:     GOALS: Pt is in agreement with the following goals.     Short term goals:  6 weeks or 10 visits   1.  Pt will demonstrate  increased lumbar ROM by at least 3 degrees from the initial ROM value with improvements noted in functional ROM and ability to perform ADLs.  (MET)  2.  Pt will demonstrate increased MedX average isometric strength value  by 10% from initial test resulting in improved ability to perform bending, lifting, and carrying activities safely, confidently.  (MET)  3.  Patient report a reduction in worst pain score by 1-2 points for improved tolerance for functional activities.  (MET)  4.  Pt able to perform HEP correctly with minimal cueing or supervision from therapist to encourage independent management of symptoms. (MET)     Long term goals: 10 weeks or 20 visits   1. Pt will demonstrate increased lumbar ROM by at least 6 degrees from initial ROM value, resulting in improved ability to perform functional fwd bending while standing and sitting. (MET)   1a. Pt will demonstrate increased lumbar ROM by at least 3 degrees from midpoint ROM value, resulting in improved ability to perform functional fwd bending while standing and sitting. (MET)  2. Pt will demonstrate increased MedX average isometric strength value  by 20% from initial test resulting in improved ability to perform bending, lifting, and carrying activities safely, confidently.  (MET)   2a. Pt will demonstrate increased MedX average isometric strength value  by 20% from midpoint test resulting in improved ability to perform bending, lifting, and carrying activities safely, confidently.  (MET)  3. Pt to demonstrate ability to independently control and reduce their pain through posture positioning and mechanical movements throughout a typical day.  (approp and ongoing)  4.  Pt will demonstrate reduced pain and improved functional outcomes as reported on the FOTO by reaching a limitation score of < or = 20% or less in order to demonstrate subjective improvement in pt's condition.    (approp and ongoing)  5. Pt will demonstrate independence with the HEP at discharge   (approp and ongoing)  6. Pt will report able to tolerance standing or walking for more than 30 minutes without increasing LBP by more than 1-2 points form baseline at rest  (approp and ongoing)    Plan   Continue with established Plan of Care towards established PT goals.    Jose C Echeverria, PTA,    7/26/2022

## 2022-07-29 ENCOUNTER — CLINICAL SUPPORT (OUTPATIENT)
Dept: REHABILITATION | Facility: OTHER | Age: 22
End: 2022-07-29
Attending: ORTHOPAEDIC SURGERY
Payer: MEDICAID

## 2022-07-29 DIAGNOSIS — R29.898 DECREASED STRENGTH OF TRUNK AND BACK: ICD-10-CM

## 2022-07-29 DIAGNOSIS — R29.898 WEAKNESS OF BOTH LOWER EXTREMITIES: ICD-10-CM

## 2022-07-29 DIAGNOSIS — M53.86 DECREASED ROM OF LUMBAR SPINE: Primary | ICD-10-CM

## 2022-07-29 DIAGNOSIS — Z74.09 IMPAIRED FUNCTIONAL MOBILITY, BALANCE, GAIT, AND ENDURANCE: ICD-10-CM

## 2022-07-29 PROCEDURE — 97110 THERAPEUTIC EXERCISES: CPT

## 2022-07-29 NOTE — PROGRESS NOTES
"[facility]      Name: Angelic Ramos  Clinic Number: [id number]    Therapy Diagnosis:   Encounter Diagnoses   Name Primary?    Decreased ROM of lumbar spine Yes    Decreased strength of trunk and back     Impaired functional mobility, balance, gait, and endurance     Weakness of both lower extremities      Physician: Vicente Min MD    Visit Date: 7/29/2022    Physician Orders: PT Eval and Treat      Medical Diagnosis from Referral:   M54.50,G89.29 (ICD-10-CM) - Chronic bilateral low back pain without sciatica  M43.16 (ICD-10-CM) - Spondylolisthesis at L4-L5 level     Evaluation Date: 5/10/2022  Authorization Period Expiration: 04/28/2023  Plan of Care Expiration: 08/31/22  Visit # / Visits authorized: 14/20     CHANGED IN SYSTEM TO [email or web address].BAN - GOES BY "MIHAELA" AND USES "HE/HIM"     Time In: 10:15 am  Time Out: 11:00 am   Total Billable Time: 45 minutes     Precautions: Standard s/p spondy and L4-5 fusion and TLIF (7/14/2020); Hx of depression; B knee pain; Psych. Reported seizure 2 months ago.      Pattern of pain determined: 1 PEN    Subjective   Pemberton reports some improvement in thoracic discomfort from last visit. Pt report "less and less days of immobility" from pain but still having times of sig discomfort and feeling less mobile.  Pt clarify that days /c inc stress levels often coincide /c days of inc pain.        Pt notes having a gym membership but using sparingly ("maybe 1 x wk").  Pt report having a workout friend to help /c accountability.       Patient reports tolerating previous visit well with no residual symptoms or issues  Patient reports their pain to be 2/10 on a 0-10 scale with 0 being no pain and 10 being the worst pain imaginable.  Pain Location: B low back and hips     Work and leisure: unemployed currently, student (mid Aug)   Pt goals: Be more active with less pain, get in shape    Objective     Baseline Isometric Testing on Med X equipment: " "Testing administered by PT  Date of testin/10/22   ROM 0-36 deg   Max Peak Torque 174    Min Peak Torque 45    Flex/Ext Ratio 3.86:1   % below normative data 18     Baseline Isometric Testing on Med X equipment: Testing administered by PT  Date of testin22   ROM 0-42 deg   Max Peak Torque 162    Min Peak Torque 66    Flex/Ext Ratio 2.5:1   % below normative data  % gain from initial 8  32   *32% below at 0 degrees    Outcomes: FOTO  Initial score: 51% limited  Visit 6 score: 50%  Visit 10: 44%  Goal: <40%    Treatment    Pt was instructed in and performed the following:     Angelic received therapeutic exercises to develop/improved posture, cardiovascular endurance, muscular endurance, lumbar/cervical ROM, strength and muscular endurance for 45 minutes including the following exercises:     Aerobic Exercise:         Treadmill 1.5 mph for 5 minutes     LTR x 10,5"  Piriformis stretch 3 x 20 sec  Active HSS x 10, 5" ea    PPT x 10 tactile cue for TA activation  TrA (Tball) SLR x 10 B  (Return to) PPT x 10     Quad   Cat cow x 10 hypermobile upper lumbar region    Not performed:   EIS x 10  Bridging with Blue TB x 10  PPT  with BKFO BTB x20   PPT + min elevation bridge c/pilates ring iso ABD x 15  PPT + min elevation bridge c/pilates ring iso ADD x 10  PPT 90/90 Heel taps x 10 each   Lifting education:   Hip hinge x 15   Golfer's lift 2x10 ea    [facility] 20[facility]   Visit Number 14   VAS Pain Rating 2   Treadmill Time (in min.) 5   Speed -   Time -   Lumbar Stretches - Slouch Overcorrection -   Extension in Standing -   Flexion in Lying -   Lumbar Extension Seat Pad -   Femur Restraint -   Top Dead Center -   Counterweight -   Lumbar Flexion 45   Lumbar Extension 0   Lumbar Peak Torque -   Min Torque -   Test Percent Below Normative Data -   Test Percent Gain in Strength from Initial  -   Lumbar Weight 80   Repetitions 18   Rating of Perceived Exertion 4   Ice - Z Lie (in min.) 5 "           Peripheral muscle strengthening which included 1 set of 15-20 repetitions at a slow, controlled 10-13 second per rep pace focused on strengthening supporting musculature for improved body mechanics and functional mobility.  Pt and therapist focused on proper form during treatment to ensure optimal strengthening of each targeted muscle group.  Machines were utilized including torso rotation, leg extension, leg curl, chest press, upright row. Tricep extension, bicep curl, leg press, and hip abduction added visit 3    Angelic received the following manual therapy techniques: nil.     Home Exercises Provided and Patient Education Provided   Home exercises include:      SKTC,   LTR,   SOC,   Piriformis stretch  PPT,  Bridges    Cardio program: visit 5 (may remember from last time, see if he still has the handout)  Lifting education date: visit 11  Lumbar roll: not obtained as of 07/29/22 stating financial concerns, advised to use rolled towel    Education provided:       Written Home Exercises Provided: Patient instructed to cont prior HEP.  Exercises were reviewed and Nilay was able to demonstrate them prior to the end of the session.  Pemberton demonstrated good  understanding of the education provided.     See EMR under Patient Instructions for exercises provided prior visit.    Assessment     Pt subjective report regarding stress level and pain indicate good awareness of her psychosocial factors affecting her sx.  Pt encouraged to cont visits /c therapist for stress reduction techniques outside of exercise.  Pt subjective report also indicate good resources for cont workouts. Pt encouraged to inc utilization including performing HEP at gym for inc comfort (vs pt's soft bed).  Pt need sig cue for TA activation indicating cont motor control deficit.  After Tball exercise, pt demo improved activation but will need f/u.  Regarding posture, although pt not own lumbar roll, pt use towel simulation thereby indicating  some understanding of importance of postural recognition and correction.  Lumbar MedX weight increased per pt tolerance last visit. Also inc flexion ROM for mobility challenge.  Today pt perform 18 reps at 4 RPE indicating inc strength and mobility.  Plan to progress reps next visit.      Patient is making progress towards established goals.  Pt will continue to benefit from skilled outpatient physical therapy to address the deficits stated in the impairment chart, provide pt/family education and to maximize pt's level of independence in the home and community environment.     Anticipated Barriers for therapy: Transportation  Pt's spiritual, cultural and educational needs considered and pt agreeable to plan of care and goals as stated below:     GOALS: Pt is in agreement with the following goals.     Short term goals:  6 weeks or 10 visits   1.  Pt will demonstrate increased lumbar ROM by at least 3 degrees from the initial ROM value with improvements noted in functional ROM and ability to perform ADLs.  (MET)  2.  Pt will demonstrate increased MedX average isometric strength value  by 10% from initial test resulting in improved ability to perform bending, lifting, and carrying activities safely, confidently.  (MET)  3.  Patient report a reduction in worst pain score by 1-2 points for improved tolerance for functional activities.  (MET)  4.  Pt able to perform HEP correctly with minimal cueing or supervision from therapist to encourage independent management of symptoms. (MET)     Long term goals: 10 weeks or 20 visits   1. Pt will demonstrate increased lumbar ROM by at least 6 degrees from initial ROM value, resulting in improved ability to perform functional fwd bending while standing and sitting. (MET)   1a. Pt will demonstrate increased lumbar ROM by at least 3 degrees from midpoint ROM value, resulting in improved ability to perform functional fwd bending while standing and sitting. (MET)  2. Pt will  demonstrate increased MedX average isometric strength value  by 20% from initial test resulting in improved ability to perform bending, lifting, and carrying activities safely, confidently.  (MET)   2a. Pt will demonstrate increased MedX average isometric strength value  by 20% from midpoint test resulting in improved ability to perform bending, lifting, and carrying activities safely, confidently.  (MET)  3. Pt to demonstrate ability to independently control and reduce their pain through posture positioning and mechanical movements throughout a typical day.  (approp and ongoing)  4.  Pt will demonstrate reduced pain and improved functional outcomes as reported on the FOTO by reaching a limitation score of < or = 20% or less in order to demonstrate subjective improvement in pt's condition.    (approp and ongoing)  5. Pt will demonstrate independence with the HEP at discharge  (approp and ongoing)  6. Pt will report able to tolerance standing or walking for more than 30 minutes without increasing LBP by more than 1-2 points form baseline at rest  (approp and ongoing)    Plan   Continue with established Plan of Care towards established PT goals.    Tyler Dye, PT,    7/29/2022

## 2022-09-18 ENCOUNTER — HOSPITAL ENCOUNTER (EMERGENCY)
Facility: OTHER | Age: 22
Discharge: HOME | End: 2022-09-19
Attending: EMERGENCY MEDICINE
Payer: MEDICAID

## 2022-09-18 DIAGNOSIS — R10.32 ACUTE BILATERAL LOWER ABDOMINAL PAIN: ICD-10-CM

## 2022-09-18 DIAGNOSIS — R10.31 ACUTE BILATERAL LOWER ABDOMINAL PAIN: ICD-10-CM

## 2022-09-18 DIAGNOSIS — K42.9 UMBILICAL HERNIA WITHOUT OBSTRUCTION AND WITHOUT GANGRENE: Primary | ICD-10-CM

## 2022-09-18 PROCEDURE — 87077 CULTURE AEROBIC IDENTIFY: CPT | Performed by: EMERGENCY MEDICINE

## 2022-09-18 PROCEDURE — 99285 EMERGENCY DEPT VISIT HI MDM: CPT | Mod: 25

## 2022-09-18 PROCEDURE — 81000 URINALYSIS NONAUTO W/SCOPE: CPT | Performed by: EMERGENCY MEDICINE

## 2022-09-18 PROCEDURE — 87088 URINE BACTERIA CULTURE: CPT | Performed by: EMERGENCY MEDICINE

## 2022-09-18 PROCEDURE — 87086 URINE CULTURE/COLONY COUNT: CPT | Performed by: EMERGENCY MEDICINE

## 2022-09-18 PROCEDURE — 87186 SC STD MICRODIL/AGAR DIL: CPT | Performed by: EMERGENCY MEDICINE

## 2022-09-18 RX ORDER — SODIUM CHLORIDE 9 MG/ML
1000 INJECTION, SOLUTION INTRAVENOUS
Status: COMPLETED | OUTPATIENT
Start: 2022-09-19 | End: 2022-09-19

## 2022-09-18 RX ORDER — ATOMOXETINE 60 MG/1
60 CAPSULE ORAL NIGHTLY
COMMUNITY
Start: 2022-09-09

## 2022-09-18 RX ORDER — TESTOSTERONE CYPIONATE 200 MG/ML
0.4 INJECTION, SOLUTION INTRAMUSCULAR WEEKLY
COMMUNITY
Start: 2022-09-15

## 2022-09-18 RX ORDER — ONDANSETRON 8 MG/1
8 TABLET, ORALLY DISINTEGRATING ORAL 2 TIMES DAILY PRN
COMMUNITY
Start: 2022-09-11

## 2022-09-19 VITALS
OXYGEN SATURATION: 100 % | TEMPERATURE: 98 F | SYSTOLIC BLOOD PRESSURE: 114 MMHG | HEART RATE: 83 BPM | WEIGHT: 293 LBS | DIASTOLIC BLOOD PRESSURE: 71 MMHG | BODY MASS INDEX: 47.09 KG/M2 | RESPIRATION RATE: 18 BRPM | HEIGHT: 66 IN

## 2022-09-19 LAB
ALBUMIN SERPL BCP-MCNC: 3.8 G/DL (ref 3.5–5.2)
ALP SERPL-CCNC: 77 U/L (ref 55–135)
ALT SERPL W/O P-5'-P-CCNC: 32 U/L (ref 10–44)
ANION GAP SERPL CALC-SCNC: 10 MMOL/L (ref 8–16)
AST SERPL-CCNC: 20 U/L (ref 10–40)
B-HCG UR QL: NEGATIVE
BACTERIA #/AREA URNS HPF: ABNORMAL /HPF
BASOPHILS # BLD AUTO: 0.03 K/UL (ref 0–0.2)
BASOPHILS NFR BLD: 0.3 % (ref 0–1.9)
BILIRUB SERPL-MCNC: 0.5 MG/DL (ref 0.1–1)
BILIRUB UR QL STRIP: NEGATIVE
BUN SERPL-MCNC: 6 MG/DL (ref 6–20)
CALCIUM SERPL-MCNC: 9.2 MG/DL (ref 8.7–10.5)
CHLORIDE SERPL-SCNC: 107 MMOL/L (ref 95–110)
CLARITY UR: ABNORMAL
CO2 SERPL-SCNC: 20 MMOL/L (ref 23–29)
COLOR UR: YELLOW
CREAT SERPL-MCNC: 0.8 MG/DL (ref 0.5–1.4)
CTP QC/QA: YES
DIFFERENTIAL METHOD: NORMAL
EOSINOPHIL # BLD AUTO: 0.2 K/UL (ref 0–0.5)
EOSINOPHIL NFR BLD: 1.7 % (ref 0–8)
ERYTHROCYTE [DISTWIDTH] IN BLOOD BY AUTOMATED COUNT: 14.2 % (ref 11.5–14.5)
EST. GFR  (NO RACE VARIABLE): >60 ML/MIN/1.73 M^2
GLUCOSE SERPL-MCNC: 90 MG/DL (ref 70–110)
GLUCOSE UR QL STRIP: NEGATIVE
HCT VFR BLD AUTO: 40.9 % (ref 37–48.5)
HCV AB SERPL QL IA: NEGATIVE
HGB BLD-MCNC: 13.5 G/DL (ref 12–16)
HGB UR QL STRIP: ABNORMAL
HIV 1+2 AB+HIV1 P24 AG SERPL QL IA: NEGATIVE
HYALINE CASTS #/AREA URNS LPF: 0 /LPF
IMM GRANULOCYTES # BLD AUTO: 0.04 K/UL (ref 0–0.04)
IMM GRANULOCYTES NFR BLD AUTO: 0.4 % (ref 0–0.5)
KETONES UR QL STRIP: NEGATIVE
LEUKOCYTE ESTERASE UR QL STRIP: ABNORMAL
LIPASE SERPL-CCNC: 11 U/L (ref 4–60)
LYMPHOCYTES # BLD AUTO: 2.2 K/UL (ref 1–4.8)
LYMPHOCYTES NFR BLD: 23.6 % (ref 18–48)
MCH RBC QN AUTO: 27.3 PG (ref 27–31)
MCHC RBC AUTO-ENTMCNC: 33 G/DL (ref 32–36)
MCV RBC AUTO: 83 FL (ref 82–98)
MICROSCOPIC COMMENT: ABNORMAL
MONOCYTES # BLD AUTO: 0.9 K/UL (ref 0.3–1)
MONOCYTES NFR BLD: 9.7 % (ref 4–15)
NEUTROPHILS # BLD AUTO: 6.1 K/UL (ref 1.8–7.7)
NEUTROPHILS NFR BLD: 64.3 % (ref 38–73)
NITRITE UR QL STRIP: NEGATIVE
NRBC BLD-RTO: 0 /100 WBC
PH UR STRIP: 6 [PH] (ref 5–8)
PLATELET # BLD AUTO: 314 K/UL (ref 150–450)
PMV BLD AUTO: 9.7 FL (ref 9.2–12.9)
POTASSIUM SERPL-SCNC: 3.9 MMOL/L (ref 3.5–5.1)
PROT SERPL-MCNC: 7.4 G/DL (ref 6–8.4)
PROT UR QL STRIP: ABNORMAL
RBC # BLD AUTO: 4.94 M/UL (ref 4–5.4)
RBC #/AREA URNS HPF: 55 /HPF (ref 0–4)
SODIUM SERPL-SCNC: 137 MMOL/L (ref 136–145)
SP GR UR STRIP: >=1.03 (ref 1–1.03)
SQUAMOUS #/AREA URNS HPF: 4 /HPF
URN SPEC COLLECT METH UR: ABNORMAL
UROBILINOGEN UR STRIP-ACNC: NEGATIVE EU/DL
WBC # BLD AUTO: 9.44 K/UL (ref 3.9–12.7)
WBC #/AREA URNS HPF: 17 /HPF (ref 0–5)

## 2022-09-19 PROCEDURE — 96360 HYDRATION IV INFUSION INIT: CPT | Mod: 59

## 2022-09-19 PROCEDURE — 85025 COMPLETE CBC W/AUTO DIFF WBC: CPT | Performed by: EMERGENCY MEDICINE

## 2022-09-19 PROCEDURE — 83690 ASSAY OF LIPASE: CPT | Performed by: EMERGENCY MEDICINE

## 2022-09-19 PROCEDURE — 87389 HIV-1 AG W/HIV-1&-2 AB AG IA: CPT | Performed by: EMERGENCY MEDICINE

## 2022-09-19 PROCEDURE — 86803 HEPATITIS C AB TEST: CPT | Performed by: EMERGENCY MEDICINE

## 2022-09-19 PROCEDURE — 25000003 PHARM REV CODE 250: Performed by: EMERGENCY MEDICINE

## 2022-09-19 PROCEDURE — 80053 COMPREHEN METABOLIC PANEL: CPT | Performed by: EMERGENCY MEDICINE

## 2022-09-19 PROCEDURE — 25500020 PHARM REV CODE 255: Performed by: EMERGENCY MEDICINE

## 2022-09-19 PROCEDURE — 81025 URINE PREGNANCY TEST: CPT | Performed by: EMERGENCY MEDICINE

## 2022-09-19 RX ORDER — KETOROLAC TROMETHAMINE 10 MG/1
10 TABLET, FILM COATED ORAL EVERY 8 HOURS PRN
Qty: 12 TABLET | Refills: 0 | Status: SHIPPED | OUTPATIENT
Start: 2022-09-19 | End: 2022-09-22

## 2022-09-19 RX ORDER — DICYCLOMINE HYDROCHLORIDE 10 MG/1
10 CAPSULE ORAL 3 TIMES DAILY PRN
Qty: 6 CAPSULE | Refills: 0 | Status: SHIPPED | OUTPATIENT
Start: 2022-09-19 | End: 2022-09-21

## 2022-09-19 RX ADMIN — SODIUM CHLORIDE 1000 ML: 0.9 INJECTION, SOLUTION INTRAVENOUS at 12:09

## 2022-09-19 RX ADMIN — IOHEXOL 100 ML: 350 INJECTION, SOLUTION INTRAVENOUS at 02:09

## 2022-09-19 NOTE — ED PROVIDER NOTES
"Encounter Date: 9/18/2022    SCRIBE #1 NOTE: I, Jess Ofelia, am scribing for, and in the presence of,  Dalia Olson MD.     History     Chief Complaint   Patient presents with    Abdominal Pain     For the past 3 days pt has been having diffuse abd pain - pt has vomited once and the pain is progressively getting worse     23 yo patient (assigned sex as female at birth, identifies as male) with PMHx of scoliosis, spondylolysis, presents to the ED with abdominal pain. He reports diffuse abdominal pain for the last week, worsening several hours prior to arrival to his lower abdominal region. He currently rates his pain 5-6/10 that he describes as "bad aches and cramps". He states he's had intermittent constipation and loose stools for the last several weeks, but started experiencing watery diarrhea today. He further notes recently starting back on his Strattera medication the past week, experienced some nausea, vomiting, and appetite loss initially, states this is normal when he resumes his Strattera medication. He denies any nausea or vomiting episodes today. He denies any fever. He denies any blood in stool. He denies any urinary symptoms. No hx of abdominal surgeries. He is also compliant with testosterone and daily Prilosec. No other modifying factors. No other associated symptoms.     The history is provided by the patient.   Review of patient's allergies indicates:   Allergen Reactions    Lactose Other (See Comments)     Past Medical History:   Diagnosis Date    Depression     Scoliosis     Spondylolysis     Urinary tract infection      Past Surgical History:   Procedure Laterality Date    BACK SURGERY      TRANSFORAMINAL LUMBAR INTERBODY FUSION  7/14/2020    Procedure: FUSION, SPINE, LUMBAR, TLIF;  Surgeon: Vicente Min MD;  Location: [facility] OR [address] [place] [address];  Service: Orthopedics;;  L4-5    WISDOM TOOTH EXTRACTION       Family History   Problem Relation Age of Onset    Breast cancer Maternal Aunt     Ovarian " cancer Maternal Aunt     Congenital heart disease Maternal Uncle     Heart disease Maternal Grandfather     Colon cancer Neg Hx      Social History     Tobacco Use    Smoking status: Never    Smokeless tobacco: Never    Tobacco comments:     vapes with CBD   Substance Use Topics    Alcohol use: No    Drug use: Yes     Frequency: 7.0 times per week     Types: Marijuana     Review of Systems   Constitutional:  Positive for appetite change. Negative for chills and fever.   HENT:  Negative for congestion, rhinorrhea and sore throat.    Eyes:  Negative for visual disturbance.   Respiratory:  Negative for cough and shortness of breath.    Cardiovascular:  Negative for chest pain.   Gastrointestinal:  Positive for abdominal pain, constipation, diarrhea, nausea and vomiting. Negative for blood in stool.   Genitourinary:  Negative for dysuria, frequency and hematuria.   Musculoskeletal:  Negative for back pain.   Skin:  Negative for rash.   Neurological:  Negative for dizziness, weakness and headaches.     Physical Exam     Initial Vitals [09/18/22 2306]   BP Pulse Resp Temp SpO2   120/74 (!) 112 16 98.4 °F (36.9 °C) 99 %      MAP       --         Physical Exam    Nursing note and vitals reviewed.  Constitutional: She appears well-developed and well-nourished. She is not diaphoretic. No distress.   HENT:   Head: Normocephalic.   Eyes: Conjunctivae and EOM are normal.   Neck: Neck supple.   Normal range of motion.  Cardiovascular:  Normal rate, regular rhythm and intact distal pulses.           Pulmonary/Chest: Breath sounds normal. No respiratory distress.   Abdominal: Abdomen is soft. There is abdominal tenderness (mild epigastric and LUQ). There is no rebound and no guarding.   Musculoskeletal:         General: No edema.      Cervical back: Normal range of motion and neck supple.     Neurological: She is alert and oriented to person, place, and time.   Skin: Skin is warm and dry.   Psychiatric: She has a normal mood and  affect. Her behavior is normal. Judgment and thought content normal.       ED Course   Procedures  Labs Reviewed   URINALYSIS, REFLEX TO URINE CULTURE - Abnormal; Notable for the following components:       Result Value    Appearance, UA Hazy (*)     Specific Gravity, UA >=1.030 (*)     Protein, UA Trace (*)     Occult Blood UA 3+ (*)     Leukocytes, UA Trace (*)     All other components within normal limits    Narrative:     Specimen Source->Urine   COMPREHENSIVE METABOLIC PANEL - Abnormal; Notable for the following components:    CO2 20 (*)     All other components within normal limits   URINALYSIS MICROSCOPIC - Abnormal; Notable for the following components:    RBC, UA 55 (*)     WBC, UA 17 (*)     Bacteria Few (*)     All other components within normal limits    Narrative:     Specimen Source->Urine   CULTURE, URINE   HIV 1 / 2 ANTIBODY    Narrative:     Release to patient->Immediate   HEPATITIS C ANTIBODY    Narrative:     Release to patient->Immediate   CBC W/ AUTO DIFFERENTIAL   LIPASE   POCT URINE PREGNANCY          Imaging Results              CT Abdomen Pelvis With Contrast (Final result)  Result time 09/19/22 02:30:07      Final result by Artemio Vela MD (09/19/22 02:30:07)                   Impression:      1.  No CT evidence of acute intra-abdominal abnormality.    2.  Mild hepatomegaly.    3.  Additional incidental findings as above.      Electronically signed by: Artemio Vela MD  Date:    09/19/2022  Time:    02:30               Narrative:    EXAMINATION:  CT ABDOMEN PELVIS WITH CONTRAST    CLINICAL HISTORY:  Abdominal pain, acute, nonlocalized;    TECHNIQUE:  Low dose axial images, sagittal and coronal reformations were obtained from the lung bases to the pubic symphysis following the IV administration of 100 mL of Omnipaque 350 .  Oral contrast was not given.    COMPARISON:  Abdominal ultrasound 04/01/2021    FINDINGS:  The lung bases are unremarkable.  There is no pleural fluid present.   The visualized portions of the heart appear normal.    The liver is mildly enlarged measuring 19.2 cm in craniocaudal length.  There is presumed focal fatty infiltration along the falciform ligament.  The gallbladder shows no evidence of stones or pericholecystic fluid.  There is no intra-or extrahepatic biliary ductal dilatation.    The stomach, pancreas, and adrenal glands appear within normal limits.  The spleen is borderline enlarged.    The kidneys are normal in size and location and enhance symmetrically.  There is no evidence of hydronephrosis. The urinary bladder is unremarkable. The uterus demonstrates no significant abnormality.  There is no significant free fluid in the pelvis.    The abdominal aorta is normal in course and caliber without significant atherosclerotic calcifications.    The visualized loops of small and large bowel show no evidence of obstruction or inflammation.  The appendix appears within normal limits without evidence of acute appendicitis.  There is a small fat containing umbilical hernia.  There is no ascites, portal venous gas, or free intraperitoneal air.    There is postsurgical change of prior posterior instrumented lumbar fusion at L4-L5.  Osseous structures appear intact.  Incidental note is made of prominent left-sided L5 transverse process forming a pseudoarticulation with the sacrum.  The extraperitoneal soft tissues are unremarkable.                                       Medications   0.9%  NaCl infusion (0 mLs Intravenous Stopped 9/19/22 0100)   iohexoL (OMNIPAQUE 350) injection 100 mL (100 mLs Intravenous Given 9/19/22 0207)     Medical Decision Making:   History:   Old Medical Records: I decided to obtain old medical records.  Clinical Tests:   Lab Tests: Ordered and Reviewed  Additional MDM:   Comments: CT of the abdomen pelvis with IV contrast showed only a small fat containing umbilical hernia but otherwise no acute findings.  During her emergency department stay, the  patient required no pain medication.  I did discuss with her the results of her lab work as well as CT.  A prescription for Bentyl was given in case of abdominal cramping and the patient was instructed to follow-up with her gastroenterologist within the next 72 hours for re-evaluation.  Indications for seeking immediate re-evaluation the emergency department were also reviewed.  The patient verbalized understanding and agreement with the plan and she was discharged home stable condition..      Scribe Attestation:   Scribe #1: I performed the above scribed service and the documentation accurately describes the services I performed. I attest to the accuracy of the note.      ED Course as of 09/19/22 0335   Mon Sep 19, 2022   0159 22-year-old healthy female to male patient presents complaining of lower abdominal pain which has been intermittent for approximately 1 week but intensified tonight.  Abdominal pain has been associated with watery bowel movement but no other symptoms.  On exam the tenderness was in the epigastric region and not in the lower abdomen.  Lab work and urinalysis were ordered.    Blood work shows no significant abnormalities.  Her UA is positive for trace leukocytes, white blood cells, bacteria and 55 red blood cells.  The patient does report frequently being diagnosed with the urinary tract infection but states that she never has symptoms.  At this time there are no urinary symptoms present.  I discussed with the patient the option to treat as if this were a UTI with strict precautions for re-evaluation verses obtaining a CT of the abdomen pelvis tonight for further evaluation.  The patient would prefer to have a CT tonight.  She is aware of the radiation exposure and would still like to proceed with the CT. [AA]      ED Course User Index  [AA] Dalia Olson MD               I, Dalia Olson  , personally performed the services described in this documentation. All medical record entries made by the  enid were at my direction and in my presence. I have reviewed the chart and agree that the record reflects my personal performance and is accurate and complete.    Clinical Impression:   Final diagnoses:  [K42.9] Umbilical hernia without obstruction and without gangrene (Primary)  [R10.31, R10.32] Acute bilateral lower abdominal pain      ED Disposition Condition    Discharge Stable          ED Prescriptions       Medication Sig Dispense Start Date End Date Auth. Provider    dicyclomine (BENTYL) 10 MG capsule Take 1 capsule (10 mg total) by mouth 3 (three) times daily as needed (Abdominal cramping). 6 capsule 9/19/2022 9/21/2022 Dalia Olson MD    ketorolac (TORADOL) 10 mg tablet Take 1 tablet (10 mg total) by mouth every 8 (eight) hours as needed for Pain. 12 tablet 9/19/2022 9/22/2022 Dalia Olson MD          Follow-up Information       Follow up With Specialties Details Why Contact Info    Your gastroenterologist  Call in 1 day To schedule a follow-up appointment for re-evaluation     Leodan Smith Jr., MD General Surgery, Vascular Surgery Schedule an appointment as soon as possible for a visit   [address] [address]  [phone]               Dalia Olson MD  09/19/22 6532

## 2022-09-21 LAB — BACTERIA UR CULT: ABNORMAL

## 2022-09-21 RX ORDER — NITROFURANTOIN 25; 75 MG/1; MG/1
100 CAPSULE ORAL 2 TIMES DAILY
Qty: 10 CAPSULE | Refills: 0 | Status: SHIPPED | OUTPATIENT
Start: 2022-09-21 | End: 2022-09-26

## 2022-12-14 ENCOUNTER — TELEPHONE (OUTPATIENT)
Dept: ORTHOPEDICS | Facility: CLINIC | Age: 22
End: 2022-12-14
Payer: MEDICAID

## 2022-12-14 NOTE — TELEPHONE ENCOUNTER
Provided pts mother with rescheduled appointment on 12/30/22 @ 9:30AM with Dr. Min, location address provided. Patients mother verbalized understanding.

## 2022-12-29 NOTE — PROGRESS NOTES
Pemberton is here for a follow up for spondy and L4-5 fusion and TLIF.  Started the healthy back program but due to mental health issues/admissions, was nocompliant and dismissed.  Was placed on baclofen recently that has helped the pain considerably.  Dealing with mental health related issues.   No outpatient medications have been marked as taking for the 12/30/22 encounter (Appointment) with Vicente Min MD.       Review of Symptoms: No fevers or neuro changess  Active Ambulatory Problems     Diagnosis Date Noted    Overactive bladder 04/05/2013    Incomplete bladder emptying 04/05/2013    Constipation - functional 05/13/2013    Overweight 08/13/2013    Family history of hearing loss 06/22/2015    Family history of hypothyroidism 06/22/2015    Dandruff 09/14/2015    Vasovagal syncope 08/13/2019    Family history of early death 08/13/2019    Chronic pain of both knees 08/13/2019    Patellofemoral pain syndrome of both knees 08/13/2019    Spondylolisthesis at L4-L5 level 10/04/2019    Spondylolysis of lumbar region 10/04/2019    Chronic low back pain 10/18/2019    Spondylisthesis 07/14/2020    Severe episode of recurrent major depressive disorder, without psychotic features 09/02/2020    Gastroesophageal reflux disease without esophagitis 09/02/2020    Pain 02/02/2021    Seasonal allergic rhinitis 02/20/2021    Decreased ROM of lumbar spine 05/10/2022    Decreased strength of trunk and back 05/10/2022    Impaired functional mobility, balance, gait, and endurance 05/10/2022    Weakness of both lower extremities 05/10/2022     Resolved Ambulatory Problems     Diagnosis Date Noted    Decreased strength of trunk and back 10/18/2019    Encounter for medical screening examination 09/02/2020    Depression with suicidal ideation 09/19/2020    Depression with suicidal ideation 02/19/2021    Decreased strength of trunk and back 12/09/2021     Past Medical History:   Diagnosis Date    Depression     Scoliosis     Spondylolysis      Urinary tract infection        Physical Exam    Patient alert and oriented  Obese  All extremities pink and warm with good cap refill and no edema.   Gait normal.    Motor exam  And dtr  lower extremities intact  Back shows full rom.  Rotation and deformity Obese, no real deformity  Xrays today show likely nonunion.  Also seen on recent abdominal CT, with screw migration.     Nonunion L4 spondy    Plan  Pain was better after healthy back program but has worsened again.  No neuro symptoms.  Nonunion on Xray and CT.   Is dealing with Borderline vs Bipolar issues as well as some family issues.   Will discuss revision with team.    Greater then 30 minutes spent on this case including time with patient, chart and xray review, discussion and charting.

## 2022-12-30 ENCOUNTER — HOSPITAL ENCOUNTER (OUTPATIENT)
Dept: RADIOLOGY | Facility: HOSPITAL | Age: 22
Discharge: HOME | End: 2022-12-30
Attending: PEDIATRICS
Payer: MEDICAID

## 2022-12-30 ENCOUNTER — OFFICE VISIT (OUTPATIENT)
Dept: ORTHOPEDICS | Facility: CLINIC | Age: 22
End: 2022-12-30
Payer: MEDICAID

## 2022-12-30 VITALS — BODY MASS INDEX: 48.55 KG/M2 | HEIGHT: 64 IN | WEIGHT: 284.38 LBS

## 2022-12-30 DIAGNOSIS — G89.29 CHRONIC BILATERAL LOW BACK PAIN WITHOUT SCIATICA: Primary | ICD-10-CM

## 2022-12-30 DIAGNOSIS — M54.50 CHRONIC BILATERAL LOW BACK PAIN WITHOUT SCIATICA: Primary | ICD-10-CM

## 2022-12-30 DIAGNOSIS — M43.16 SPONDYLOLISTHESIS AT L4-L5 LEVEL: ICD-10-CM

## 2022-12-30 DIAGNOSIS — G89.29 CHRONIC BILATERAL LOW BACK PAIN WITHOUT SCIATICA: ICD-10-CM

## 2022-12-30 DIAGNOSIS — M54.50 CHRONIC BILATERAL LOW BACK PAIN WITHOUT SCIATICA: ICD-10-CM

## 2022-12-30 PROCEDURE — 99999 PR PBB SHADOW E&M-EST. PATIENT-LVL III: ICD-10-PCS | Mod: PBBFAC,,, | Performed by: ORTHOPAEDIC SURGERY

## 2022-12-30 PROCEDURE — 1159F PR MEDICATION LIST DOCUMENTED IN MEDICAL RECORD: ICD-10-PCS | Mod: CPTII,,, | Performed by: ORTHOPAEDIC SURGERY

## 2022-12-30 PROCEDURE — 1159F MED LIST DOCD IN RCRD: CPT | Mod: CPTII,,, | Performed by: ORTHOPAEDIC SURGERY

## 2022-12-30 PROCEDURE — 72100 XR LUMBAR SPINE AP AND LATERAL: ICD-10-PCS | Mod: 26,,, | Performed by: RADIOLOGY

## 2022-12-30 PROCEDURE — 3008F BODY MASS INDEX DOCD: CPT | Mod: CPTII,,, | Performed by: ORTHOPAEDIC SURGERY

## 2022-12-30 PROCEDURE — 72100 X-RAY EXAM L-S SPINE 2/3 VWS: CPT | Mod: TC

## 2022-12-30 PROCEDURE — 72100 X-RAY EXAM L-S SPINE 2/3 VWS: CPT | Mod: 26,,, | Performed by: RADIOLOGY

## 2022-12-30 PROCEDURE — 99214 PR OFFICE/OUTPT VISIT, EST, LEVL IV, 30-39 MIN: ICD-10-PCS | Mod: S$PBB,,, | Performed by: ORTHOPAEDIC SURGERY

## 2022-12-30 PROCEDURE — 3008F PR BODY MASS INDEX (BMI) DOCUMENTED: ICD-10-PCS | Mod: CPTII,,, | Performed by: ORTHOPAEDIC SURGERY

## 2022-12-30 PROCEDURE — 99214 OFFICE O/P EST MOD 30 MIN: CPT | Mod: S$PBB,,, | Performed by: ORTHOPAEDIC SURGERY

## 2022-12-30 PROCEDURE — 99213 OFFICE O/P EST LOW 20 MIN: CPT | Mod: PBBFAC | Performed by: ORTHOPAEDIC SURGERY

## 2022-12-30 PROCEDURE — 99999 PR PBB SHADOW E&M-EST. PATIENT-LVL III: CPT | Mod: PBBFAC,,, | Performed by: ORTHOPAEDIC SURGERY

## 2022-12-30 RX ORDER — CELECOXIB 200 MG/1
200 CAPSULE ORAL DAILY PRN
Qty: 30 CAPSULE | Refills: 1 | Status: ON HOLD | OUTPATIENT
Start: 2022-12-30 | End: 2023-08-29 | Stop reason: SDUPTHER

## 2023-01-02 ENCOUNTER — PATIENT MESSAGE (OUTPATIENT)
Dept: ORTHOPEDICS | Facility: CLINIC | Age: 23
End: 2023-01-02
Payer: MEDICAID

## 2023-01-02 PROBLEM — G89.29 CHRONIC LOW BACK PAIN: Status: RESOLVED | Noted: 2019-10-18 | Resolved: 2023-01-02

## 2023-01-02 PROBLEM — M54.50 CHRONIC LOW BACK PAIN: Status: RESOLVED | Noted: 2019-10-18 | Resolved: 2023-01-02

## 2023-01-02 PROBLEM — M43.06 SPONDYLOLYSIS OF LUMBAR REGION: Status: RESOLVED | Noted: 2019-10-04 | Resolved: 2023-01-02

## 2023-01-02 PROBLEM — R29.898 WEAKNESS OF BOTH LOWER EXTREMITIES: Status: RESOLVED | Noted: 2022-05-10 | Resolved: 2023-01-02

## 2023-01-04 ENCOUNTER — PATIENT MESSAGE (OUTPATIENT)
Dept: ORTHOPEDICS | Facility: CLINIC | Age: 23
End: 2023-01-04
Payer: MEDICAID

## 2023-01-12 ENCOUNTER — PATIENT MESSAGE (OUTPATIENT)
Dept: ORTHOPEDICS | Facility: CLINIC | Age: 23
End: 2023-01-12
Payer: MEDICAID

## 2023-01-31 ENCOUNTER — PATIENT MESSAGE (OUTPATIENT)
Dept: ORTHOPEDICS | Facility: CLINIC | Age: 23
End: 2023-01-31
Payer: MEDICAID

## 2023-03-03 ENCOUNTER — PATIENT MESSAGE (OUTPATIENT)
Dept: ORTHOPEDICS | Facility: CLINIC | Age: 23
End: 2023-03-03
Payer: MEDICARE

## 2023-03-24 ENCOUNTER — PATIENT MESSAGE (OUTPATIENT)
Dept: ORTHOPEDICS | Facility: CLINIC | Age: 23
End: 2023-03-24
Payer: MEDICARE

## 2023-04-06 ENCOUNTER — PATIENT MESSAGE (OUTPATIENT)
Dept: ORTHOPEDICS | Facility: CLINIC | Age: 23
End: 2023-04-06
Payer: MEDICARE

## 2023-04-26 DIAGNOSIS — M79.641 RIGHT HAND PAIN: Primary | ICD-10-CM

## 2023-05-01 ENCOUNTER — CLINICAL SUPPORT (OUTPATIENT)
Dept: REHABILITATION | Facility: HOSPITAL | Age: 23
End: 2023-05-01
Payer: MEDICAID

## 2023-05-01 DIAGNOSIS — R29.898 DECREASED GRIP STRENGTH: ICD-10-CM

## 2023-05-01 DIAGNOSIS — M79.642 BILATERAL HAND PAIN: ICD-10-CM

## 2023-05-01 DIAGNOSIS — M79.641 BILATERAL HAND PAIN: ICD-10-CM

## 2023-05-01 DIAGNOSIS — M79.641 RIGHT HAND PAIN: Primary | ICD-10-CM

## 2023-05-01 PROCEDURE — 97165 OT EVAL LOW COMPLEX 30 MIN: CPT

## 2023-05-01 NOTE — PATIENT INSTRUCTIONS
Joint Protection Program for Arthritis     Joint Protection (Wringing)    Avoid wringing towels by twisting.  Solution: Loop towel around sink faucet as if braiding and pull gently, or let drip-dry.    Joint Protection (Use Large Joints)    Avoid placing pressure on fingertips.  Solution: Transfer work to other parts of body which are not affected or which have greater strength. Using body weight to push heavy doors open is an example.    Joint Protection (Ulnar Deviation)    Avoid positions that cause fingers to lean sideways toward little finger.  Solution: Use devices like jar-openers to assist in activities.    Joint Protection (Pinch)    Avoid tight pinch, such as when holding a pen.  Solution: Use a thick pen with a felt tip to reduce pressure on fingers.    Joint Protection (Lifting)    Avoid picking up heavy items with one hand.  Solution: Use both hands, and slide item whenever possible.     Joint Protection ()    Avoid: grasping thin utensils for prolonged periods.  Solution: Hold thick-handled tools in dagger fashion when-ever possible for performing tasks such as stirring or scrub-gelacio. Relax fingers every 10 minutes during activity.    Joint Protection (Carrying)    Avoid carrying items with weight on fingers.  Solution: Use a shoulder bag or a back pack.  Copyright © I. All rights reserved.

## 2023-05-01 NOTE — PLAN OF CARE
"[facility] Occupational Therapy  Initial Evaluation     Name: Angelic Ramos  Clinic Number: [id number]    Therapy Diagnosis:   1. Right hand pain        2. Bilateral hand pain        3. Decreased  strength         .  Physician: Chapito Garza NP  Physician Orders: Eval and Treat  Medical Diagnosis: Right Hand Pain  Evaluation Date: 5/1/2023  Insurance Authorization Period Expiration: 12/31/23  Plan of Care Certification Period: 6/30/23  Visit # / Visits authorized: 1 / 1  FOTO: Eval: 37%    Precautions:  Standard  Pronouns: He/Him     Time In:215p  Time Out: 3p  Total Appointment Time (timed & untimed codes): 45 minutes    Subjective     Date of Onset: "A few months back"    History of Current Condition/Mechanism of Injury: Nilay reports: My left had is usually worse. If I am crocheting or carrying heavy bags I notice in my (hypothenar) region it starts hurting. I haven't really recognized the trigger for my right side as much yet that sends it radiating down into my arm. I have also noticed that I have started to lose  strength. I tend to fumble things. Carrying the bags and repeated motions definitely makes it worse.    Falls: "A few tumbles in the past few years"    Involved Side: Bilateral   Dominant Side: Right    Mechanism of Injury: Repetitive strain/trauma   Surgical Procedure: N/A no injections, reports use of wrist brace and Voltaren gel     Prior Therapy: None     Pain:  Functional Pain Scale Rating 0-10:   4/10 on average  1/10 at best  8/10 at worst  Location: bilateral hands, radial side  Description: Aching and very weak   Aggravating Factors: Writing, repetitive tasks, carrying heavy bags   Easing Factors: Pressing thumb into palm, wearing brace /rest     Occupation:  Disabled / Student     Functional Limitations/Social History:    Previous functional status includes: Independent with all ADLs.     Current Functional Status   Home/Living environment: lives with their " partner (girl friend)       Limitation of Functional Status as follows:   ADLs/IADLs:     - Feeding: I     - Bathing: I    - Dressing/Grooming: I (unless a bad pain day then its  mod I)     - Home Management: Mod I due to extended     - Driving: N/A      Leisure: Musical instruments (Ukulele, trombone) Crocheting      Patient's Goals for Therapy: Return to desirable tasks without excessive pain     Past Medical History/Physical Systems Review:   Angelic BUSBY Workmichelle  has a past medical history of Depression, Scoliosis, Spondylolysis, and Urinary tract infection.    Angelic Ramos  has a past surgical history that includes [place] tooth extraction; Transforaminal lumbar interbody fusion (7/14/2020); and Back surgery.    Angelic has a current medication list which includes the following prescription(s): atomoxetine, celecoxib, lamotrigine, mirtazapine, needle (disp) 25 gauge, omeprazole, ondansetron, syringe (disposable), and testosterone cypionate.    Review of patient's allergies indicates:   Allergen Reactions    Lactose Other (See Comments)        Objective     Observation/Appearance:  Skin intact and full ROM noted     Special Tests:     5/1/2023  Left  Right    Finkelstein's Test Negative Negative    Phalen's Test Negative Negative         Strength (Dyanmometer) and Pinch Strength (Pinch Gauge)  Measured in pounds and psi. Average of three trials.   5/1/2023 5/1/2023    Left Right   Rung II 52# 75#   Garrett Pinch 17 psi  21 psi    3pt Pinch 15 psi  13 psi    2pt Pinch 11 psi  8 psi       Manual Muscle Testing    Median Innervated:  Muscle Action 5/1/2023 5/1/2023    Digital Branch(Recurrent)  LEFT  RIGHT   Abductor Pollicis Brevis C6-T1 MP Flexion 4/5 4+/5   Opponens Pollicis C6-T1 Opposition 5/5 5/5   Flexor Pollicis Brevis(suprfl head) C7- T1 MP flexion 5/5 5/5   Lumbricals C7-T1 Index MP flexion/IP ext 5/5 5/5   Lumbricals C7-T1 Long MP flexion/IP ext 4+/5 5/5       CMS Impairment/Limitation/Restriction for  FOTO Survey    Therapist reviewed FOTO scores for Angelic Ramos on 5/1/2023.   FOTO documents entered into [email or web address] - see Media section.    Limitation Score: 37%         Treatment     Patient Education and Home Exercises      Education provided:   -role of OT, goals for OT, scheduling/cancellations, insurance limitations with patient.  -Additional Education provided: Tissue Healing, Joint Protection Techniques    Written Home Exercises Provided: yes.  Exercises were reviewed and Nialy was able to demonstrate them prior to the end of the session.    Nilay demonstrated good  understanding of the education provided.     Pt was advised to perform these exercises free of pain, and to stop performing them if pain occurs.    See EMR under Patient Instructions for exercises provided 5/1/2023.    Assessment     Angelic Ramos is a 23 y.o. female referred to outpatient occupational therapy and presents with a medical diagnosis of bilateral hand pain, resulting in Decreased  strength, Decreased pinch strength, Decreased muscle strength, Decreased functional hand use, Increased pain, and Diminished/Impaired Coordination and demonstrates limitations as described in the chart below. Following medical record review it is determined that patient will benefit from occupational therapy services in order to maximize pain free and/or functional use of bilateral hands. The following goals were discussed with the patient and patient is in agreement with them as to be addressed in the treatment plan. The patient's rehab potential is Good.     Anticipated barriers to occupational therapy: None    Plan of care discussed with patient: Yes  Patient's spiritual, cultural and educational needs considered and patient is agreeable to the plan of care and goals as stated below:     Profile and History Assessment of Occupational Performance Level of Clinical Decision Making Complexity Score   Occupational Profile:   Angelic Ramos is a 23  y.o. female who lives with their partner and is currently employed as student. Angelic BUSBY Workman has difficulty with  ADLS and IADLs  affecting his/her daily functional abilities.     Medical and Therapy History Review:   Brief               Performance Deficits    Physical:  Muscle Power/Strength  Muscle Endurance   Strength  Pinch Strength  Fine Motor Coordination  Proprioception Functions  Pain    Cognitive:  No Deficits    Psychosocial:    No Deficits     Clinical Decision Making:  low    Assessment Process:  Comprehensive Assessments    Modification/Need for Assistance:  Not Necessary    Intervention Selection:  Multiple Treatment Options       low  Based on PMHX, co morbidities , data from assessments and functional level of assistance required with task and clinical presentation directly impacting function.       The following goals were discussed with the patient and patient is in agreement with them as to be addressed in the treatment plan.     Goals:    (8 weeks):  1)   Patient to be IND with HEP and modalities for pain management  2)   Increase  strength 10 lbs. to grasp computer and books. (Left hand)  3)   Increase pinch at least 2 psi bilaterally for improve crocheting.   4)  Pt will report 2 out of 10 pain with HEP and ADLs for at least 3 consecutive sessions indicative of improved function participation in ADLs and IADLs.  5)   Patient to score at 20% or less on FOTO to demonstrate improved perception of functional BUE use.  6)   Pt will return to near to prior level of function for ADLs and household management reporting I or Mod I with ADLs (dressing, feeding, grooming, toileting).        Plan   Certification Period/Plan of care expiration: 5/1/2023 to 6/30/23.    Outpatient Occupational Therapy 1 times weekly for 8 weeks to include the following interventions: Paraffin, Fluidotherapy, Manual therapy/joint mobilizations, Modalities for pain management, Therapeutic exercises/activities.,  Strengthening, Orthotic Fabrication/Fit/Training, Joint Protection, and Energy Conservation.      Romina Arce, OT, CHT

## 2023-05-16 PROBLEM — M79.641 BILATERAL HAND PAIN: Status: ACTIVE | Noted: 2023-05-16

## 2023-05-16 PROBLEM — M79.642 BILATERAL HAND PAIN: Status: ACTIVE | Noted: 2023-05-16

## 2023-05-16 PROBLEM — R29.898 DECREASED GRIP STRENGTH: Status: ACTIVE | Noted: 2023-05-16

## 2023-05-17 ENCOUNTER — CLINICAL SUPPORT (OUTPATIENT)
Dept: REHABILITATION | Facility: HOSPITAL | Age: 23
End: 2023-05-17
Payer: MEDICAID

## 2023-05-17 DIAGNOSIS — M79.642 BILATERAL HAND PAIN: Primary | ICD-10-CM

## 2023-05-17 DIAGNOSIS — M79.641 BILATERAL HAND PAIN: Primary | ICD-10-CM

## 2023-05-17 DIAGNOSIS — R29.898 DECREASED GRIP STRENGTH: ICD-10-CM

## 2023-05-17 PROCEDURE — 97110 THERAPEUTIC EXERCISES: CPT

## 2023-05-17 PROCEDURE — 97140 MANUAL THERAPY 1/> REGIONS: CPT

## 2023-05-17 NOTE — PROGRESS NOTES
"  Occupational Therapy Daily Treatment Note     Date: 5/17/2023  Name: Nilay Ramos  Clinic Number: [id number]    Therapy Diagnosis:   1. Bilateral hand pain        2. Decreased  strength          Physician: Chapito Garza NP  Physician Orders: Eval and Treat  Medical Diagnosis: Right Hand Pain  Evaluation Date: 5/1/2023  Insurance Authorization Period Expiration: 12/31/23  Plan of Care Certification Period: 6/30/23  Visit # / Visits authorized: 2 / 20  FOTO: Eval: 37%     Precautions:  Standard  Pronouns: He/Him     Time In: 1:05 pm  Time Out: 1:50 pm  Total Appointment Time (timed & untimed codes): 45 min    Precautions: Standard      Subjective     History of Current Condition/Mechanism of Injury: Nilay reports: My left had is usually worse. If I am crocheting or carrying heavy bags I notice in my (hypothenar) region it starts hurting. I haven't really recognized the trigger for my right side as much yet that sends it radiating down into my arm. I have also noticed that I have started to lose  strength. I tend to fumble things. Carrying the bags and repeated motions definitely makes it worse.    Pt reports: "Yesterday my right hand was really hurting. It felt on fire." He feels the pain begin in thenars, radiates to medial elbow. Has tried heat and ice, and the massage gun and finds relief.  Nilay Ramos was compliant with home exercise program given last session.   Response to previous treatment: tolerated well  Functional change: none    Pain: 1-2/10 current, 8/10 at worst  Location: Right and left wrist    Objective     Observation/Appearance:  Skin intact and full ROM noted      Special Tests:      5/1/2023  Left  Right    Finkelstein's Test Negative Negative    Phalen's Test Negative Negative           Strength (Dyanmometer) and Pinch Strength (Pinch Gauge)  Measured in pounds and psi. Average of three trials.    5/1/2023 5/1/2023     Left Right   Rung II 52# 75#   Garrett Pinch 17 psi  21 psi    3pt " Pinch 15 psi  13 psi    2pt Pinch 11 psi  8 psi         Manual Muscle Testing     Median Innervated:  Muscle Action 5/1/2023 5/1/2023    Digital Branch(Recurrent)   LEFT  RIGHT   Abductor Pollicis Brevis C6-T1 MP Flexion 4/5 4+/5   Opponens Pollicis C6-T1 Opposition 5/5 5/5   Flexor Pollicis Brevis(suprfl head) C7- T1 MP flexion 5/5 5/5   Lumbricals C7-T1 Index MP flexion/IP ext 5/5 5/5   Lumbricals C7-T1 Long MP flexion/IP ext 4+/5 5/5      Right:  MMT pronation: 5/5  MMT FDS: 5/5  MMT wrist flex: 5/5 for FCR and FCU  MMT MF ext: 5/5  MMT ECRL/ECRB: 5/5  MMT ECU: 5/5    Tender to palpation in flexor/pronator group and in medial epi  No tenderness extensor wad or radial tunnel but reports numbness/tingling in dorsal digits    Isolated wrist ext: 70  Composite wrist extension: 70       CMS Impairment/Limitation/Restriction for FOTO Survey     Therapist reviewed FOTO scores for Angelic Ramos on 5/1/2023.   FOTO documents entered into [employer] - see Media section.     Limitation Score: 37%           Treatment     Pemberton received the following manual therapy techniques for 15 minutes:   - IASTM to palmar area, extensor and flexor pathways  - Composite wrist flex/ext stretching    Pemberton received therapeutic exercises for 30 minutes including:  - AROM wrist flex/ext close and open fist  - Application of KT I band along flexor pathway for inhibition technique, and I band across volar wrist for spatial correction  - Yellow putty:  (1 min only) and pinches (20 reps)  - Wrist curls, 1#, 10 reps each - instructed not to perform at home due to pain  - Tendon gliding with dowel and string for wrist flex/ext      Home Exercises and Education Provided     Education provided:   - Rest and respect pain, activity modification and body mechanics  - Progress towards goals     Written Home Exercises Provided: Patient instructed to cont prior HEP.  Exercises were reviewed and Nilay was able to demonstrate them prior to the end of the  session.  Nilay demonstrated good  understanding of the HEP provided.   .   See EMR under Patient Instructions for exercises provided prior visit.        Assessment     Nilay reports pain more so in Right arm, noticing that pain worsened after long term phone use/holding cell phone. Assessment of MMT and palpation indicates possible flexor pathway involvement. He notes no pain with MMT to flexors/extensors or pronators, but does have tenderness with palpation along flexor pathway and medial epi. Applied KT for flexor inhibition technique and will assess tolerance next visit. He noted pain with 1# wrist curls, but tolerated the dowel gliding and yellow putty gripping better. He shows good understanding of body mechanics and activity modification (Rest breaks, pacing).    Nilay is progressing well towards her goals and there are no updates to goals at this time. Pt prognosis is Good. Pt will continue to benefit from skilled outpatient occupational therapy to address the deficits listed in the problem list on initial evaluation provide pt/family education and to maximize pt's level of independence in the home and community environment.     Pt's spiritual, cultural and educational needs considered and pt agreeable to plan of care and goals.    Goals:    (8 weeks):  1)   Patient to be IND with HEP and modalities for pain management  2)   Increase  strength 10 lbs. to grasp computer and books. (Left hand)  3)   Increase pinch at least 2 psi bilaterally for improve crocheting.   4)  Pt will report 2 out of 10 pain with HEP and ADLs for at least 3 consecutive sessions indicative of improved function participation in ADLs and IADLs.  5)   Patient to score at 20% or less on FOTO to demonstrate improved perception of functional BUE use.  6)   Pt will return to near to prior level of function for ADLs and household management reporting I or Mod I with ADLs (dressing, feeding, grooming, toileting).      Plan   Continue skilled  occupational therapy with individualized plan of care focusing on increasing functional independence and participation in meaningful daily activities.    Updates/Grading for next session: progress as tolerated      Nenita Brown, OT

## 2023-05-31 ENCOUNTER — CLINICAL SUPPORT (OUTPATIENT)
Dept: REHABILITATION | Facility: HOSPITAL | Age: 23
End: 2023-05-31
Payer: MEDICARE

## 2023-05-31 DIAGNOSIS — R29.898 DECREASED GRIP STRENGTH: ICD-10-CM

## 2023-05-31 DIAGNOSIS — M79.642 BILATERAL HAND PAIN: Primary | ICD-10-CM

## 2023-05-31 DIAGNOSIS — M79.641 BILATERAL HAND PAIN: Primary | ICD-10-CM

## 2023-05-31 PROCEDURE — 97530 THERAPEUTIC ACTIVITIES: CPT

## 2023-05-31 NOTE — PATIENT INSTRUCTIONS
[facility], OCCUPATIONAL THERAPY  HOME EXERCISE PROGRAM     Complete the following strengthening exercises 1x/day or every other day  Hold each position x5 seconds then relax. Complete 10 repetitions each.         Resisted Wrist Extension   With forearm resting palm down, resist upward movement   of hand with other hand.         Resisted Wrist Flexion   With forearm resting palm up, resist upward movement   of hand with other hand.         Resisted Wrist Radial Deviation  With forearm resting with thumb up, use other hand to   resist upward movement of hand at wrist.         Resisted Wrist Ulnar Deviation  With forearm resting thumb up, use other hand to resist   downward movement of hand at wrist.    Copyright © I. All rights reserved.     Therapist: WENDI Robins/JOVANNI

## 2023-05-31 NOTE — PROGRESS NOTES
"  Occupational Therapy Daily Treatment Note     Date: 5/31/2023  Name: Nilay Ramos  Clinic Number: [id number]    Therapy Diagnosis:   1. Bilateral hand pain        2. Decreased  strength            Physician: Chapito Garza NP  Physician Orders: Eval and Treat  Medical Diagnosis: Right Hand Pain  Evaluation Date: 5/1/2023  Insurance Authorization Period Expiration: 12/31/23  Plan of Care Certification Period: 6/30/23  Visit # / Visits authorized: 3 / 20  FOTO: Eval: 37%     Precautions:  Standard  Pronouns: He/Him     Time In: 1:48 pm  Time Out: 2:27 pm  Total Appointment Time (timed & untimed codes): 39 min    Precautions: Standard      Subjective     History of Current Condition/Mechanism of Injury: Nilay reports: My left had is usually worse. If I am crocheting or carrying heavy bags I notice in my (hypothenar) region it starts hurting. I haven't really recognized the trigger for my right side as much yet that sends it radiating down into my arm. I have also noticed that I have started to lose  strength. I tend to fumble things. Carrying the bags and repeated motions definitely makes it worse.    Pt reports: "My elbow has been bothering me a lot lately at the end of the day. The KT didn't last long - it came off as I was going home" pointing to medial elbow, radiating to forearm  Nilay Ramos was compliant with home exercise program given last session.   Response to previous treatment: tolerated well  Functional change: none    Pain: 1-2/10 current, 8/10 at worst  Location: Right and left wrist    Objective     Observation/Appearance:  Skin intact and full ROM noted      Special Tests:      5/1/2023  Left  Right    Finkelstein's Test Negative Negative    Phalen's Test Negative Negative           Strength (Dyanmometer) and Pinch Strength (Pinch Gauge)  Measured in pounds and psi. Average of three trials.    5/1/2023 5/1/2023     Left Right   Rung II 52# 75#   Garrett Pinch 17 psi  21 psi    3pt Pinch 15 psi  " 13 psi    2pt Pinch 11 psi  8 psi         Manual Muscle Testing     Median Innervated:  Muscle Action 5/1/2023 5/1/2023    Digital Branch(Recurrent)   LEFT  RIGHT   Abductor Pollicis Brevis C6-T1 MP Flexion 4/5 4+/5   Opponens Pollicis C6-T1 Opposition 5/5 5/5   Flexor Pollicis Brevis(suprfl head) C7- T1 MP flexion 5/5 5/5   Lumbricals C7-T1 Index MP flexion/IP ext 5/5 5/5   Lumbricals C7-T1 Long MP flexion/IP ext 4+/5 5/5      Right:  MMT pronation: 5/5  MMT FDS: 5/5  MMT wrist flex: 5/5 for FCR and FCU  MMT MF ext: 5/5  MMT ECRL/ECRB: 5/5  MMT ECU: 5/5    Tender to palpation in flexor/pronator group and in medial epi  No tenderness extensor wad or radial tunnel but reports numbness/tingling in dorsal digits    Isolated wrist ext: 70  Composite wrist extension: 70       CMS Impairment/Limitation/Restriction for FOTO Survey     Therapist reviewed FOTO scores for Angelic Ramos on 5/1/2023.   FOTO documents entered into [facility] - see Media section.     Limitation Score: 37%           Treatment     Pemberton participated in dynamic functional therapeutic activities to improve functional performance for 39  minutes, including:  - IASTM to palmar area and flexor pathways bilaterally  - Composite wrist flex/ext stretching  - AROM wrist flex/ext close and open fist  - Tendon gliding with dowel and string for wrist flex/ext  - True Balance bilateral (2 min ea)  - Red flex bar oscillations (2 min each)  - frisbee + glass beads rotating in both directions for wrist stabilization  - Wrist isometrics: flex/ext, RD/UD (10 ea, bilateral)    - Yellow putty:  (1 min only) and pinches (20 reps) - performing in HEP      Home Exercises and Education Provided     Education provided:   - Rest and respect pain, activity modification and body mechanics  - Progress towards goals     Written Home Exercises Provided: Patient instructed to cont prior HEP.  Exercises were reviewed and Pemberton was able to demonstrate them prior to the end of the  session.  Nilay demonstrated good  understanding of the HEP provided.   .   See EMR under Patient Instructions for exercises provided prior visit.        Assessment     Nilay reports pain in bilateral medial elbows and sometimes radiating to wrist. He tolerated progression of isometric strengthening and wrist stabilization tasks well with some complaints of forearm fatigue. He will continue to benefit from progression as tolerated.    Nilay is progressing well towards her goals and there are no updates to goals at this time. Pt prognosis is Good. Pt will continue to benefit from skilled outpatient occupational therapy to address the deficits listed in the problem list on initial evaluation provide pt/family education and to maximize pt's level of independence in the home and community environment.     Pt's spiritual, cultural and educational needs considered and pt agreeable to plan of care and goals.    Goals:    (8 weeks):  1)   Patient to be IND with HEP and modalities for pain management  2)   Increase  strength 10 lbs. to grasp computer and books. (Left hand)  3)   Increase pinch at least 2 psi bilaterally for improve crocheting.   4)  Pt will report 2 out of 10 pain with HEP and ADLs for at least 3 consecutive sessions indicative of improved function participation in ADLs and IADLs.  5)   Patient to score at 20% or less on FOTO to demonstrate improved perception of functional BUE use.  6)   Pt will return to near to prior level of function for ADLs and household management reporting I or Mod I with ADLs (dressing, feeding, grooming, toileting).      Plan   Continue skilled occupational therapy with individualized plan of care focusing on increasing functional independence and participation in meaningful daily activities.    Updates/Grading for next session: progress as tolerated, addition of proximal strengthening      Nenita Brown, OT

## 2023-06-07 ENCOUNTER — CLINICAL SUPPORT (OUTPATIENT)
Dept: REHABILITATION | Facility: HOSPITAL | Age: 23
End: 2023-06-07
Payer: MEDICAID

## 2023-06-07 DIAGNOSIS — M79.641 BILATERAL HAND PAIN: Primary | ICD-10-CM

## 2023-06-07 DIAGNOSIS — M79.642 BILATERAL HAND PAIN: Primary | ICD-10-CM

## 2023-06-07 DIAGNOSIS — R29.898 DECREASED GRIP STRENGTH: ICD-10-CM

## 2023-06-07 PROCEDURE — 97530 THERAPEUTIC ACTIVITIES: CPT

## 2023-06-07 NOTE — PATIENT INSTRUCTIONS
[facility], OCCUPATIONAL THERAPY  HOME EXERCISE PROGRAM         Stretch Scalene/SCM  Cross arms over chest  Place hands over upper ribs and collar bone and firmly press downward toward stomach.  Gently bend neck to the side and backward slightly.  Hold stretch, relax and repeat.  Repeat stretch on opposite side.    Special Instructions:  Stretch will be felt in front and side of neck.  Perform 1 set of 5 repetitions, twice a day.    Hold exercises for 10 seconds.            Stretch Levator scapulae arm down  Sit on chair  Grasp seat with the hand on the side of the tightness  Place your other hand on head and gently pull down and diagonally to other side.    Special Instructions:  Look in the direction you are pulling.  Perform 1 set of 2 reps, twice a day    Hold exercise for 30 seconds.            Stretch Pectoral standing:  Initially start with just a tennis ball release using doorframe.  Eventually, as tolerated, work on the stretch:  Stand at doorway with forearm on door frame, elbow bent to 90 degrees.  Keep back straight, turn body away from arm  Stretch felt across chest and in front of shoulders    Special Instructions:  Perform 1 set of 2 repetitions, twice a day.    Hold exercise for 30 seconds.            Therapist: WENDI Robins/JOVANNI

## 2023-06-07 NOTE — PROGRESS NOTES
"  Occupational Therapy Daily Treatment Note     Date: 6/7/2023  Name: Nilay Ramos  Clinic Number: [id number]    Therapy Diagnosis:   1. Bilateral hand pain        2. Decreased  strength            Physician: Chapito Garza NP  Physician Orders: Eval and Treat  Medical Diagnosis: Right Hand Pain  Evaluation Date: 5/1/2023  Insurance Authorization Period Expiration: 12/31/23  Plan of Care Certification Period: 6/30/23  Visit # / Visits authorized: 4 / 20  FOTO: Eval: 37%     Precautions:  Standard  Pronouns: He/Him     Time In: 1:37 pm  Time Out: 2:15 pm  Total Appointment Time (timed & untimed codes): 38 min    Precautions: Standard      Subjective     History of Current Condition/Mechanism of Injury: Nilay reports: My left had is usually worse. If I am crocheting or carrying heavy bags I notice in my (hypothenar) region it starts hurting. I haven't really recognized the trigger for my right side as much yet that sends it radiating down into my arm. I have also noticed that I have started to lose  strength. I tend to fumble things. Carrying the bags and repeated motions definitely makes it worse.    Pt reports: "I feel all the nerves in my left arm. I feel it from my shoulder all the way down. It tingles in a painful way."  Nilay Ramos was compliant with home exercise program given last session.   Response to previous treatment: tolerated well  Functional change: none    Pain: 1-2/10 current, 8/10 at worst  Location: Right and left wrist    Objective     Observation/Appearance:  Skin intact and full ROM noted      Special Tests:      5/1/2023  Left  Right    Finkelstein's Test Negative Negative    Phalen's Test Negative Negative           Strength (Dyanmometer) and Pinch Strength (Pinch Gauge)  Measured in pounds and psi. Average of three trials.    5/1/2023 5/1/2023     Left Right   Rung II 52# 75#   Garrett Pinch 17 psi  21 psi    3pt Pinch 15 psi  13 psi    2pt Pinch 11 psi  8 psi         Manual Muscle " Testing     Median Innervated:  Muscle Action 5/1/2023 5/1/2023    Digital Branch(Recurrent)   LEFT  RIGHT   Abductor Pollicis Brevis C6-T1 MP Flexion 4/5 4+/5   Opponens Pollicis C6-T1 Opposition 5/5 5/5   Flexor Pollicis Brevis(suprfl head) C7- T1 MP flexion 5/5 5/5   Lumbricals C7-T1 Index MP flexion/IP ext 5/5 5/5   Lumbricals C7-T1 Long MP flexion/IP ext 4+/5 5/5      Right:  MMT pronation: 5/5  MMT FDS: 5/5  MMT wrist flex: 5/5 for FCR and FCU  MMT MF ext: 5/5  MMT ECRL/ECRB: 5/5  MMT ECU: 5/5    Tender to palpation in flexor/pronator group and in medial epi  No tenderness extensor wad or radial tunnel but reports numbness/tingling in dorsal digits    Isolated wrist ext: 70  Composite wrist extension: 70    Leatha's Test: increased tingling/tight pain in Left arm immediately       CMS Impairment/Limitation/Restriction for FOTO Survey     Therapist reviewed FOTO scores for Angelic Ramos on 5/1/2023.   FOTO documents entered into [employer] - see Media section.     Limitation Score: 37%           Treatment     Nilay participated in dynamic functional therapeutic activities to improve functional performance for 38  minutes, including:  - IASTM to palmar area and flexor pathways bilaterally  - Composite wrist flex/ext stretching  - AROM wrist flex/ext close and open fist  - Scalene stretch, levator scapulae stretch, pec minor doorway stretch (bilaterally) - performed and added to HEP  - Green theraband rows, ER, and horz abduction (1 x 10) - performed and added to HEP      - Yellow putty:  (1 min only) and pinches (20 reps) - performing in HEP      Home Exercises and Education Provided     Education provided:   - Rest and respect pain, activity modification and body mechanics  - Progress towards goals     Written Home Exercises Provided: yes. Neck/pec stretches and theraband tasks  Exercises were reviewed and Nilay was able to demonstrate them prior to the end of the session.  Nilay demonstrated good  understanding  of the HEP provided.   .   See EMR under Patient Instructions for exercises provided prior visit.        Assessment     Nilay reports continued pain in bilateral medial elbows but new onset of pain radiating from Left shoulder throughout arm. He had fair tolerance for neck/pec stretches, and will require review next session. He tolerated addition of proximal strengthening well. Discussed if symptoms persist or worsen, he may benefit from referral to hand specialist.  He will continue to benefit from progression as tolerated in the meanwhile.    Nilay is progressing well towards her goals and there are no updates to goals at this time. Pt prognosis is Good. Pt will continue to benefit from skilled outpatient occupational therapy to address the deficits listed in the problem list on initial evaluation provide pt/family education and to maximize pt's level of independence in the home and community environment.     Pt's spiritual, cultural and educational needs considered and pt agreeable to plan of care and goals.    Goals:    (8 weeks):  1)   Patient to be IND with HEP and modalities for pain management  2)   Increase  strength 10 lbs. to grasp computer and books. (Left hand)  3)   Increase pinch at least 2 psi bilaterally for improve crocheting.   4)  Pt will report 2 out of 10 pain with HEP and ADLs for at least 3 consecutive sessions indicative of improved function participation in ADLs and IADLs.  5)   Patient to score at 20% or less on FOTO to demonstrate improved perception of functional BUE use.  6)   Pt will return to near to prior level of function for ADLs and household management reporting I or Mod I with ADLs (dressing, feeding, grooming, toileting).      Plan   Continue skilled occupational therapy with individualized plan of care focusing on increasing functional independence and participation in meaningful daily activities.    Updates/Grading for next session: progress as tolerated, progress proximal  strengthening      Nenita Brown, OT

## 2023-06-09 ENCOUNTER — PATIENT MESSAGE (OUTPATIENT)
Dept: ORTHOPEDICS | Facility: CLINIC | Age: 23
End: 2023-06-09
Payer: MEDICARE

## 2023-06-14 ENCOUNTER — CLINICAL SUPPORT (OUTPATIENT)
Dept: REHABILITATION | Facility: HOSPITAL | Age: 23
End: 2023-06-14
Payer: MEDICAID

## 2023-06-14 DIAGNOSIS — M79.642 BILATERAL HAND PAIN: Primary | ICD-10-CM

## 2023-06-14 DIAGNOSIS — M79.641 BILATERAL HAND PAIN: Primary | ICD-10-CM

## 2023-06-14 DIAGNOSIS — R29.898 DECREASED GRIP STRENGTH: ICD-10-CM

## 2023-06-14 PROCEDURE — 97530 THERAPEUTIC ACTIVITIES: CPT

## 2023-06-14 NOTE — PROGRESS NOTES
"  Occupational Therapy Daily Treatment Note     Date: 6/14/2023  Name: Nilay Ramos  Clinic Number: [id number]    Therapy Diagnosis:   1. Bilateral hand pain        2. Decreased  strength            Physician: Chapito Garza NP  Physician Orders: Eval and Treat  Medical Diagnosis: Right Hand Pain  Evaluation Date: 5/1/2023  Insurance Authorization Period Expiration: 12/31/23  Plan of Care Certification Period: 6/30/23  Visit # / Visits authorized: 5 / 20  FOTO: Eval: 37%     Precautions:  Standard  Pronouns: He/Him     Time In: 3:50 pm  Time Out: 4:30 pm  Total Appointment Time (timed & untimed codes): 40 min    Precautions: Standard      Subjective     History of Current Condition/Mechanism of Injury: Nilay reports: My left had is usually worse. If I am crocheting or carrying heavy bags I notice in my (hypothenar) region it starts hurting. I haven't really recognized the trigger for my right side as much yet that sends it radiating down into my arm. I have also noticed that I have started to lose  strength. I tend to fumble things. Carrying the bags and repeated motions definitely makes it worse.    Pt reports: "I feel it more in the Right elbow again. Right before I got on the bus to get here my Right elbow was screaming." Pt reports he messaged his MD re the LUE nerve symptoms and has a follow up on 7/3/23  Nilay Ramos was compliant with home exercise program given last session.   Response to previous treatment: tolerated well  Functional change: none    Pain: 6/10 at worst, more so in the Right elbow  Location: Right and left wrist    Objective     Observation/Appearance:  Skin intact and full ROM noted      Special Tests:      5/1/2023  Left  Right    Finkelstein's Test Negative Negative    Phalen's Test Negative Negative           Strength (Dyanmometer) and Pinch Strength (Pinch Gauge)  Measured in pounds and psi. Average of three trials.    5/1/2023 5/1/2023     Left Right   Rung II 52# 75#   Garrett " Pinch 17 psi  21 psi    3pt Pinch 15 psi  13 psi    2pt Pinch 11 psi  8 psi         Manual Muscle Testing     Median Innervated:  Muscle Action 5/1/2023 5/1/2023    Digital Branch(Recurrent)   LEFT  RIGHT   Abductor Pollicis Brevis C6-T1 MP Flexion 4/5 4+/5   Opponens Pollicis C6-T1 Opposition 5/5 5/5   Flexor Pollicis Brevis(suprfl head) C7- T1 MP flexion 5/5 5/5   Lumbricals C7-T1 Index MP flexion/IP ext 5/5 5/5   Lumbricals C7-T1 Long MP flexion/IP ext 4+/5 5/5      Right:  MMT pronation: 5/5  MMT FDS: 5/5  MMT wrist flex: 5/5 for FCR and FCU  MMT MF ext: 5/5  MMT ECRL/ECRB: 5/5  MMT ECU: 5/5    Tender to palpation in flexor/pronator group and in medial epi  No tenderness extensor wad or radial tunnel but reports numbness/tingling in dorsal digits    Isolated wrist ext: 70  Composite wrist extension: 70    Leatha's Test: increased tingling/tight pain in Left arm immediately       CMS Impairment/Limitation/Restriction for FOTO Survey     Therapist reviewed FOTO scores for Angelic Ramos on 5/1/2023.   FOTO documents entered into [employer] - see Media section.     Limitation Score: 37%           Treatment     Nilay participated in dynamic functional therapeutic activities to improve functional performance for 40 minutes, including:  - IASTM to palmar area, extensor and flexor pathways bilaterally  - Composite wrist flex/ext stretching  - Tendon gliding with dowel and string for wrist flex/ext  - AROM wrist flex/ext close and open fist   - performing in HEP  - performing in HEP  - Red theraband rows, ER, and horz abduction (2 x 10)  - Wrist curls #1 (1 x 10) bilaterally          Home Exercises and Education Provided     Education provided:   - Rest and respect pain, activity modification and body mechanics  - Progress towards goals     Written Home Exercises Provided: yes. Neck/pec stretches and theraband tasks  Exercises were reviewed and Nilay was able to demonstrate them prior to the end of the session.  Nilay  demonstrated good  understanding of the HEP provided.   .   See EMR under Patient Instructions for exercises provided prior visit.        Assessment     Nilay reports continued pain in bilateral medial elbows, R > L, and radiating pain in LUE. He notes relief when lying down, and more so with towel roll between shoulder blades. He also notes exacerbation of symptoms with reaching for objects/flexing shoulder. Symptoms appear consistent with neural tension. He has a follow up with his MD on 7/3/23 and will discuss options at that time. He will benefit from continued OT to focus on progression of proximal strengthening, stretching, ROM and STM. If symptoms persist, he may benefit from transition to PT to address shoulder/neck.    Nilay is progressing well towards her goals and there are no updates to goals at this time. Pt prognosis is Good. Pt will continue to benefit from skilled outpatient occupational therapy to address the deficits listed in the problem list on initial evaluation provide pt/family education and to maximize pt's level of independence in the home and community environment.     Pt's spiritual, cultural and educational needs considered and pt agreeable to plan of care and goals.    Goals:    (8 weeks):  1)   Patient to be IND with HEP and modalities for pain management  2)   Increase  strength 10 lbs. to grasp computer and books. (Left hand)  3)   Increase pinch at least 2 psi bilaterally for improve crocheting.   4)  Pt will report 2 out of 10 pain with HEP and ADLs for at least 3 consecutive sessions indicative of improved function participation in ADLs and IADLs.  5)   Patient to score at 20% or less on FOTO to demonstrate improved perception of functional BUE use.  6)   Pt will return to near to prior level of function for ADLs and household management reporting I or Mod I with ADLs (dressing, feeding, grooming, toileting).      Plan   Continue skilled occupational therapy with individualized  plan of care focusing on increasing functional independence and participation in meaningful daily activities.    Updates/Grading for next session: progress as tolerated, progress proximal strengthening      Nenita Brown, OT

## 2023-06-16 ENCOUNTER — PATIENT MESSAGE (OUTPATIENT)
Dept: ADMINISTRATIVE | Facility: OTHER | Age: 23
End: 2023-06-16
Payer: MEDICARE

## 2023-06-16 ENCOUNTER — PATIENT MESSAGE (OUTPATIENT)
Dept: REHABILITATION | Facility: HOSPITAL | Age: 23
End: 2023-06-16
Payer: MEDICARE

## 2023-06-16 DIAGNOSIS — M43.16 SPONDYLOLISTHESIS AT L4-L5 LEVEL: Primary | ICD-10-CM

## 2023-06-21 ENCOUNTER — CLINICAL SUPPORT (OUTPATIENT)
Dept: REHABILITATION | Facility: HOSPITAL | Age: 23
End: 2023-06-21
Payer: MEDICAID

## 2023-06-21 DIAGNOSIS — R29.898 DECREASED GRIP STRENGTH: ICD-10-CM

## 2023-06-21 DIAGNOSIS — M79.641 BILATERAL HAND PAIN: Primary | ICD-10-CM

## 2023-06-21 DIAGNOSIS — M79.642 BILATERAL HAND PAIN: Primary | ICD-10-CM

## 2023-06-21 PROCEDURE — 97530 THERAPEUTIC ACTIVITIES: CPT

## 2023-06-21 NOTE — PROGRESS NOTES
"  Occupational Therapy Daily Treatment Note     Date: 6/21/2023  Name: Nilay Ramos  Clinic Number: [id number]    Therapy Diagnosis:   1. Bilateral hand pain        2. Decreased  strength            Physician: Chapito Garza NP  Physician Orders: Eval and Treat  Medical Diagnosis: Right Hand Pain  Evaluation Date: 5/1/2023  Insurance Authorization Period Expiration: 12/31/23  Plan of Care Certification Period: 6/30/23  Visit # / Visits authorized: 6 / 20  FOTO: Eval: 37%     Precautions:  Standard  Pronouns: He/Him     Time In: 3:53 pm  Time Out: 4:33 pm  Total Appointment Time (timed & untimed codes): 40 min    Precautions: Standard      Subjective     History of Current Condition/Mechanism of Injury: Nilay reports: My left had is usually worse. If I am crocheting or carrying heavy bags I notice in my (hypothenar) region it starts hurting. I haven't really recognized the trigger for my right side as much yet that sends it radiating down into my arm. I have also noticed that I have started to lose  strength. I tend to fumble things. Carrying the bags and repeated motions definitely makes it worse.    Pt reports: "I feel numbness/tingling every time I try to pull my shoulders back (scap retraction). Mainly in my left arm" Pt reports he messaged his MD re the LUE nerve symptoms and has a follow up on 7/3/23  Nilay Ramos was compliant with home exercise program given last session.   Response to previous treatment: tolerated well  Functional change: none    Pain: 6/10 at worst, more so in the Right elbow  Location: Right and left wrist    Objective     Observation/Appearance:  Skin intact and full ROM noted      Special Tests:      5/1/2023  Left  Right    Finkelstein's Test Negative Negative    Phalen's Test Negative Negative           Strength (Dyanmometer) and Pinch Strength (Pinch Gauge)  Measured in pounds and psi. Average of three trials.    5/1/2023 5/1/2023     Left Right   Rung II 52# 75#   Garrett Pinch " 17 psi  21 psi    3pt Pinch 15 psi  13 psi    2pt Pinch 11 psi  8 psi         Manual Muscle Testing     Median Innervated:  Muscle Action 5/1/2023 5/1/2023    Digital Branch(Recurrent)   LEFT  RIGHT   Abductor Pollicis Brevis C6-T1 MP Flexion 4/5 4+/5   Opponens Pollicis C6-T1 Opposition 5/5 5/5   Flexor Pollicis Brevis(suprfl head) C7- T1 MP flexion 5/5 5/5   Lumbricals C7-T1 Index MP flexion/IP ext 5/5 5/5   Lumbricals C7-T1 Long MP flexion/IP ext 4+/5 5/5      Right:  MMT pronation: 5/5  MMT FDS: 5/5  MMT wrist flex: 5/5 for FCR and FCU  MMT MF ext: 5/5  MMT ECRL/ECRB: 5/5  MMT ECU: 5/5    Tender to palpation in flexor/pronator group and in medial epi  No tenderness extensor wad or radial tunnel but reports numbness/tingling in dorsal digits    Isolated wrist ext: 70  Composite wrist extension: 70    Leatha's Test: increased tingling/tight pain in Left arm immediately       CMS Impairment/Limitation/Restriction for FOTO Survey     Therapist reviewed FOTO scores for Angelic Ramos on 5/1/2023.   FOTO documents entered into [employer] - see Media section.     Limitation Score: 37%           Treatment     Pemberton participated in dynamic functional therapeutic activities to improve functional performance for 40 minutes, including:  - IASTM to palmar area, extensor and flexor pathways bilaterally  - Composite wrist flex/ext stretching  - Tendon gliding with dowel and string for wrist flex/ext  - AROM wrist flex/ext close and open fist   - performing in HEP  - performing in HEP  - Red theraband rows, ER, and horz abduction (1 x 10)  - Added red theraband diagonals (1 x 10) - added to HEP  - Serratus anterior wall slides (1 x 10) - added to HEP, cueing verbal and tactile for technique  - Wrist curls #1 (1 x 10) bilaterally - not performed today        Home Exercises and Education Provided     Education provided:   - Rest and respect pain, activity modification and body mechanics  - Progress towards goals     Written Home  Exercises Provided: yes. Additional red theraband tasks  Exercises were reviewed and Nilay was able to demonstrate them prior to the end of the session.  Nilya demonstrated good  understanding of the HEP provided.   .   See EMR under Patient Instructions for exercises provided prior visit.        Assessment     Nilay reports continued pain in bilateral medial elbows, R > L, although reports this has been improving and complains more of the radiating pain in LUE. He notes relief when lying down, and more so with towel roll between shoulder blades. He also notes exacerbation of symptoms with reaching for objects/flexing shoulder. Symptoms appear consistent with neural tension. He has a follow up with his MD on 7/3/23 and will discuss options at that time. He will benefit from continued OT to focus on progression of proximal strengthening, stretching, ROM and STM. If symptoms persist, he may benefit from transition to PT to address shoulder/neck.    Nilay is progressing well towards her goals and there are no updates to goals at this time. Pt prognosis is Good. Pt will continue to benefit from skilled outpatient occupational therapy to address the deficits listed in the problem list on initial evaluation provide pt/family education and to maximize pt's level of independence in the home and community environment.     Pt's spiritual, cultural and educational needs considered and pt agreeable to plan of care and goals.    Goals:    (8 weeks):  1)   Patient to be IND with HEP and modalities for pain management  2)   Increase  strength 10 lbs. to grasp computer and books. (Left hand)  3)   Increase pinch at least 2 psi bilaterally for improve crocheting.   4)  Pt will report 2 out of 10 pain with HEP and ADLs for at least 3 consecutive sessions indicative of improved function participation in ADLs and IADLs.  5)   Patient to score at 20% or less on FOTO to demonstrate improved perception of functional BUE use.  6)   Pt will  return to near to prior level of function for ADLs and household management reporting I or Mod I with ADLs (dressing, feeding, grooming, toileting).      Plan   Continue skilled occupational therapy with individualized plan of care focusing on increasing functional independence and participation in meaningful daily activities.    Updates/Grading for next session: progress as tolerated, progress proximal strengthening      Nenita Brown, OT

## 2023-06-28 ENCOUNTER — CLINICAL SUPPORT (OUTPATIENT)
Dept: REHABILITATION | Facility: HOSPITAL | Age: 23
End: 2023-06-28
Payer: MEDICARE

## 2023-06-28 DIAGNOSIS — R29.898 DECREASED GRIP STRENGTH: ICD-10-CM

## 2023-06-28 DIAGNOSIS — M79.642 BILATERAL HAND PAIN: Primary | ICD-10-CM

## 2023-06-28 DIAGNOSIS — M79.641 BILATERAL HAND PAIN: Primary | ICD-10-CM

## 2023-06-28 PROCEDURE — 97530 THERAPEUTIC ACTIVITIES: CPT

## 2023-06-28 NOTE — PROGRESS NOTES
"  Occupational Therapy Daily Treatment Note     Date: 6/28/2023  Name: Nilay Ramos  Clinic Number: [id number]    Therapy Diagnosis:   1. Bilateral hand pain        2. Decreased  strength            Physician: Chapito Garza NP  Physician Orders: Eval and Treat  Medical Diagnosis: Right Hand Pain  Evaluation Date: 5/1/2023  Insurance Authorization Period Expiration: 12/31/23  Plan of Care Certification Period: 6/30/23  Visit # / Visits authorized: 7 / 20  FOTO: Eval: 37%     Precautions:  Standard  Pronouns: He/Him     Time In: 3:50 pm  Time Out: 4:35 pm  Total Appointment Time (timed & untimed codes): 45 min    Precautions: Standard      Subjective     History of Current Condition/Mechanism of Injury: Nilay reports: My left had is usually worse. If I am crocheting or carrying heavy bags I notice in my (hypothenar) region it starts hurting. I haven't really recognized the trigger for my right side as much yet that sends it radiating down into my arm. I have also noticed that I have started to lose  strength. I tend to fumble things. Carrying the bags and repeated motions definitely makes it worse.    Pt reports: "I've been trying to do the stretches, and it helps a little. I forget to do them, I should set an alarm. I was able to subhash a whole leaf last night" Pt reports he messaged his MD re the LUE nerve symptoms and has a follow up on 7/3/23  Nilay Ramos was compliant with home exercise program given last session.   Response to previous treatment: tolerated well  Functional change: none    Pain: 6/10 at worst, more so in the Right elbow  Location: Right and left wrist    Objective     Observation/Appearance:  Skin intact and full ROM noted      Special Tests:      5/1/2023  Left  Right    Finkelstein's Test Negative Negative    Phalen's Test Negative Negative           Strength (Dyanmometer) and Pinch Strength (Pinch Gauge)  Measured in pounds and psi. Average of three trials.    5/1/2023 5/1/2023     " Left Right   Rung II 52# 75#   Garrett Pinch 17 psi  21 psi    3pt Pinch 15 psi  13 psi    2pt Pinch 11 psi  8 psi         Manual Muscle Testing     Median Innervated:  Muscle Action 5/1/2023 5/1/2023    Digital Branch(Recurrent)   LEFT  RIGHT   Abductor Pollicis Brevis C6-T1 MP Flexion 4/5 4+/5   Opponens Pollicis C6-T1 Opposition 5/5 5/5   Flexor Pollicis Brevis(suprfl head) C7- T1 MP flexion 5/5 5/5   Lumbricals C7-T1 Index MP flexion/IP ext 5/5 5/5   Lumbricals C7-T1 Long MP flexion/IP ext 4+/5 5/5      Right:  MMT pronation: 5/5  MMT FDS: 5/5  MMT wrist flex: 5/5 for FCR and FCU  MMT MF ext: 5/5  MMT ECRL/ECRB: 5/5  MMT ECU: 5/5    Tender to palpation in flexor/pronator group and in medial epi  No tenderness extensor wad or radial tunnel but reports numbness/tingling in dorsal digits    Isolated wrist ext: 70  Composite wrist extension: 70    Cervical Radiculopathy Screening   6/28/23  Positive or Negative   Cervical rotation toward involved side <60* negative   Upper Limb Tension Testing (1A) positive   Cervical Distraction negative   Spurling's Test negative        CMS Impairment/Limitation/Restriction for FOTO Survey     Therapist reviewed FOTO scores for Angelic Ramos on 5/1/2023.   FOTO documents entered into [employer] - see Media section.     Limitation Score: 37%           Treatment     Pemberton participated in dynamic functional therapeutic activities to improve functional performance for 45 minutes, including:  - Cervical radiculopathy screening  - IASTM to palmar area, extensor and flexor pathways bilaterally  - Composite wrist flex/ext stretching  - Tendon gliding with dowel and string for wrist flex/ext  - AROM wrist flex/ext close and open fist   - performing in HEP  - performing in HEP  - Red theraband rows, ER, and horz abduction, diagonals (1 x 10)  - Serratus anterior wall slides (1 x 10) - modified to sitting, with overhead reach  - Wrist curls #1 (1 x 10) bilaterally - not performed today        Home  Exercises and Education Provided     Education provided:   - Rest and respect pain, activity modification and body mechanics  - Progress towards goals     Written Home Exercises Provided: Patient instructed to cont prior HEP  Exercises were reviewed and Nilay was able to demonstrate them prior to the end of the session.  Nilay demonstrated good  understanding of the HEP provided.   .   See EMR under Patient Instructions for exercises provided prior visit.        Assessment     Nilay reports continued pain in bilateral medial elbows, R > L, although reports this has been improving and complains more of the radiating pain in LUE. He notes relief when lying down, and more so with towel roll between shoulder blades. He also notes exacerbation of symptoms with reaching for objects/flexing shoulder. Symptoms appear consistent with neural tension which was positive with ULTT today. He has a follow up with his MD on 7/3/23 and will discuss options at that time. He will benefit from continued OT to focus on progression of proximal strengthening, stretching, ROM and STM. If symptoms persist, he may benefit from transition to PT to address shoulder/neck.    Nilay is progressing well towards her goals and there are no updates to goals at this time. Pt prognosis is Good. Pt will continue to benefit from skilled outpatient occupational therapy to address the deficits listed in the problem list on initial evaluation provide pt/family education and to maximize pt's level of independence in the home and community environment.     Pt's spiritual, cultural and educational needs considered and pt agreeable to plan of care and goals.    Goals:    (8 weeks):  1)   Patient to be IND with HEP and modalities for pain management  2)   Increase  strength 10 lbs. to grasp computer and books. (Left hand)  3)   Increase pinch at least 2 psi bilaterally for improve crocheting.   4)  Pt will report 2 out of 10 pain with HEP and ADLs for at least 3  consecutive sessions indicative of improved function participation in ADLs and IADLs.  5)   Patient to score at 20% or less on FOTO to demonstrate improved perception of functional BUE use.  6)   Pt will return to near to prior level of function for ADLs and household management reporting I or Mod I with ADLs (dressing, feeding, grooming, toileting).      Plan   Continue skilled occupational therapy with individualized plan of care focusing on increasing functional independence and participation in meaningful daily activities.    Updates/Grading for next session: progress as tolerated, progress proximal strengthening      Nenita Brown, OT

## 2023-07-03 ENCOUNTER — HOSPITAL ENCOUNTER (OUTPATIENT)
Dept: RADIOLOGY | Facility: HOSPITAL | Age: 23
Discharge: HOME | End: 2023-07-03
Attending: ORTHOPAEDIC SURGERY
Payer: MEDICAID

## 2023-07-03 ENCOUNTER — OFFICE VISIT (OUTPATIENT)
Dept: ORTHOPEDICS | Facility: CLINIC | Age: 23
End: 2023-07-03
Payer: MEDICAID

## 2023-07-03 DIAGNOSIS — M54.12 CERVICAL RADICULOPATHY: ICD-10-CM

## 2023-07-03 DIAGNOSIS — M54.2 CERVICALGIA: Primary | ICD-10-CM

## 2023-07-03 DIAGNOSIS — M50.30 DDD (DEGENERATIVE DISC DISEASE), CERVICAL: ICD-10-CM

## 2023-07-03 PROCEDURE — 99999 PR PBB SHADOW E&M-EST. PATIENT-LVL III: ICD-10-PCS | Mod: PBBFAC,,, | Performed by: ORTHOPAEDIC SURGERY

## 2023-07-03 PROCEDURE — 99999 PR PBB SHADOW E&M-EST. PATIENT-LVL III: CPT | Mod: PBBFAC,,, | Performed by: ORTHOPAEDIC SURGERY

## 2023-07-03 PROCEDURE — 99214 OFFICE O/P EST MOD 30 MIN: CPT | Mod: S$PBB,,, | Performed by: ORTHOPAEDIC SURGERY

## 2023-07-03 PROCEDURE — 72050 X-RAY EXAM NECK SPINE 4/5VWS: CPT | Mod: 26,,, | Performed by: RADIOLOGY

## 2023-07-03 PROCEDURE — 72050 XR CERVICAL SPINE AP LAT WITH FLEX EXTEN: ICD-10-PCS | Mod: 26,,, | Performed by: RADIOLOGY

## 2023-07-03 PROCEDURE — 72050 X-RAY EXAM NECK SPINE 4/5VWS: CPT | Mod: TC

## 2023-07-03 PROCEDURE — 1159F PR MEDICATION LIST DOCUMENTED IN MEDICAL RECORD: ICD-10-PCS | Mod: CPTII,,, | Performed by: ORTHOPAEDIC SURGERY

## 2023-07-03 PROCEDURE — 99214 PR OFFICE/OUTPT VISIT, EST, LEVL IV, 30-39 MIN: ICD-10-PCS | Mod: S$PBB,,, | Performed by: ORTHOPAEDIC SURGERY

## 2023-07-03 PROCEDURE — 1159F MED LIST DOCD IN RCRD: CPT | Mod: CPTII,,, | Performed by: ORTHOPAEDIC SURGERY

## 2023-07-03 PROCEDURE — 99213 OFFICE O/P EST LOW 20 MIN: CPT | Mod: PBBFAC | Performed by: ORTHOPAEDIC SURGERY

## 2023-07-03 NOTE — PROGRESS NOTES
"DATE: 7/3/2023  PATIENT: Angelic Ramos    Attending Physician: Glen Minor M.D.    CHIEF COMPLAINT: Cervical radiculopathy    HISTORY:  Angelic Ramos is a 23 y.o. female (transitioning) here for initial evaluation of neck and left arm pain (Neck - 8, Arm - 7). The pain has been present for several weeks to months. The patient describes the pain as sharp like electric currents. The pain is worse with activity, especially sitting up straight and croquet and improved by nothing. There is minimal associated numbness and tingling. There is no subjective weakness. Prior treatments have included tylenol, NSAIDs, CBD and formal OT through PCP with some mild relief, but no surgery to cervical spine. History of lumbar TLIF for spondy with Dr. Min and Mily in 2020 with plans for revision in August 2023.      The Patient denies myelopathic symptoms such as handwriting changes or difficulty with buttons/coins/keys. Denies perineal paresthesias, bowel/bladder dysfunction.    PAST MEDICAL/SURGICAL HISTORY:  Past Medical History:   Diagnosis Date    Depression     Scoliosis     Spondylolysis     Urinary tract infection      Past Surgical History:   Procedure Laterality Date    BACK SURGERY      TRANSFORAMINAL LUMBAR INTERBODY FUSION  7/14/2020    Procedure: FUSION, SPINE, LUMBAR, TLIF;  Surgeon: Vicente iMn MD;  Location: [facility] OR [address] [place] [address];  Service: Orthopedics;;  L4-5    WISDOM TOOTH EXTRACTION         Current Medications:   Current Outpatient Medications:     atomoxetine (STRATTERA) 60 MG capsule, Take 60 mg by mouth every morning., Disp: , Rfl:     celecoxib (CELEBREX) 200 MG capsule, Take 1 capsule (200 mg total) by mouth daily as needed for Pain., Disp: 30 capsule, Rfl: 1    lamoTRIgine (LAMICTAL) 150 MG Tab, Take 150 mg by mouth 2 (two) times daily., Disp: , Rfl:     mirtazapine (REMERON) 30 MG tablet, Take 30 mg by mouth every evening., Disp: , Rfl:     needle, disp, 25 gauge 25 gauge x 5/8" Ndle, " Use as directed to inject testosterone, Disp: 100 each, Rfl: 2    omeprazole (PRILOSEC) 40 MG capsule, Take 1 capsule (40 mg total) by mouth every morning., Disp: 30 capsule, Rfl: 1    ondansetron (ZOFRAN-ODT) 8 MG TbDL, Take 8 mg by mouth 2 (two) times daily as needed., Disp: , Rfl:     syringe, disposable, 1 mL Syrg, Use as directed to inject testosterone, Disp: 50 Syringe, Rfl: 3    testosterone cypionate (DEPOTESTOTERONE CYPIONATE) 200 mg/mL injection, Inject 0.4 mLs into the muscle once a week., Disp: , Rfl:     Social History:   Social History     Socioeconomic History    Marital status: Significant Other   Tobacco Use    Smoking status: Never    Smokeless tobacco: Never    Tobacco comments:     vapes with CBD   Substance and Sexual Activity    Alcohol use: No    Drug use: Yes     Frequency: 7.0 times per week     Types: Marijuana    Sexual activity: Yes     Partners: Female   Other Topics Concern    Patient feels they ought to cut down on drinking/drug use No    Patient annoyed by others criticizing their drinking/drug use No    Patient has felt bad or guilty about drinking/drug use No    Patient has had a drink/used drugs as an eye opener in the AM No   Social History Narrative    Lives at home with 2 brothers     1 dog     Brother smokes outside        EXAM:  There were no vitals taken for this visit.    Gait: Normal station and gait, no difficulty with toe or heel walk.   Skin: Cervical skin negative for rashes, lesions, hairy patches and surgical scars.  Range of motion: Cervical range of motion is acceptable. There is no tenderness to palpation.  Spinal Balance: Global saggital and coronal spinal balance acceptable, no significant for scoliosis and kyphosis.  Musculoskeletal: No pain with the range of motion of the bilateral shoulders and elbows. Normal bulk and contour of the bilateral hands.  Neurological: Normal strength and tone in all major motor groups in the bilateral upper and lower extremities.  Normal sensation to light touch in the C5-T1 and L2-S1 dermatomes bilaterally.  Deep tendon reflexes symmetric in the bilateral upper and lower extremities.  Negative Inverted Radial Reflex and Hernández's bilaterally. Negative Babinski bilaterally.     IMAGING:   Today I personally reviewed AP, Lat and Flex/Ex  upright C-spine films that demonstrate no instability at C spine. No acute fractures or dislocations.      There is no height or weight on file to calculate BMI.  Hemoglobin A1C   Date Value Ref Range Status   06/30/2020 5.2 4.0 - 5.6 % Final     Comment:     ADA Screening Guidelines:  5.7-6.4%  Consistent with prediabetes  >or=6.5%  Consistent with diabetes  High levels of fetal hemoglobin interfere with the HbA1C  assay. Heterozygous hemoglobin variants (HbS, HgC, etc)do  not significantly interfere with this assay.   However, presence of multiple variants may affect accuracy.     02/26/2020 5.3 4.0 - 5.6 % Final     Comment:     ADA Screening Guidelines:  5.7-6.4%  Consistent with prediabetes  >or=6.5%  Consistent with diabetes  High levels of fetal hemoglobin interfere with the HbA1C  assay. Heterozygous hemoglobin variants (HbS, HgC, etc)do  not significantly interfere with this assay.   However, presence of multiple variants may affect accuracy.     08/05/2019 5.0 4.0 - 5.6 % Final     Comment:     ADA Screening Guidelines:  5.7-6.4%  Consistent with prediabetes  >or=6.5%  Consistent with diabetes  High levels of fetal hemoglobin interfere with the HbA1C  assay. Heterozygous hemoglobin variants (HbS, HgC, etc)do  not significantly interfere with this assay.   However, presence of multiple variants may affect accuracy.         ASSESSMENT/PLAN:  Cervicalgia  -     MRI Cervical Spine Without Contrast; Future; Expected date: 07/03/2023    Cervical radiculopathy      No follow-ups on file.    - MRI C spine to further evaluate radiculopathy as patient has tried multimodals and formal therapy with minimal  relief.

## 2023-07-05 ENCOUNTER — PATIENT MESSAGE (OUTPATIENT)
Dept: REHABILITATION | Facility: HOSPITAL | Age: 23
End: 2023-07-05

## 2023-07-09 ENCOUNTER — PATIENT MESSAGE (OUTPATIENT)
Dept: ORTHOPEDICS | Facility: CLINIC | Age: 23
End: 2023-07-09
Payer: MEDICARE

## 2023-07-13 ENCOUNTER — CLINICAL SUPPORT (OUTPATIENT)
Dept: REHABILITATION | Facility: HOSPITAL | Age: 23
End: 2023-07-13
Payer: MEDICARE

## 2023-07-13 DIAGNOSIS — M79.642 BILATERAL HAND PAIN: Primary | ICD-10-CM

## 2023-07-13 DIAGNOSIS — M79.641 BILATERAL HAND PAIN: Primary | ICD-10-CM

## 2023-07-13 DIAGNOSIS — R29.898 DECREASED GRIP STRENGTH: ICD-10-CM

## 2023-07-13 PROCEDURE — 97530 THERAPEUTIC ACTIVITIES: CPT
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
## 2023-07-18 ENCOUNTER — HOSPITAL ENCOUNTER (OUTPATIENT)
Dept: RADIOLOGY | Facility: HOSPITAL | Age: 23
Discharge: HOME | End: 2023-07-18
Attending: STUDENT IN AN ORGANIZED HEALTH CARE EDUCATION/TRAINING PROGRAM
Payer: MEDICAID

## 2023-07-18 DIAGNOSIS — M54.2 CERVICALGIA: ICD-10-CM

## 2023-07-18 PROCEDURE — 72141 MRI NECK SPINE W/O DYE: CPT | Mod: TC

## 2023-07-18 PROCEDURE — 72141 MRI CERVICAL SPINE WITHOUT CONTRAST: ICD-10-PCS | Mod: 26,,, | Performed by: RADIOLOGY

## 2023-07-18 PROCEDURE — 72141 MRI NECK SPINE W/O DYE: CPT | Mod: 26,,, | Performed by: RADIOLOGY

## 2023-07-19 ENCOUNTER — OFFICE VISIT (OUTPATIENT)
Dept: ORTHOPEDICS | Facility: CLINIC | Age: 23
End: 2023-07-19
Payer: MEDICAID

## 2023-07-19 DIAGNOSIS — M54.2 NECK PAIN: ICD-10-CM

## 2023-07-19 DIAGNOSIS — M43.16 SPONDYLOLISTHESIS AT L4-L5 LEVEL: Primary | ICD-10-CM

## 2023-07-19 DIAGNOSIS — M54.12 CERVICAL RADICULOPATHY: Primary | ICD-10-CM

## 2023-07-19 PROCEDURE — 1160F RVW MEDS BY RX/DR IN RCRD: CPT | Mod: CPTII,95,, | Performed by: PHYSICIAN ASSISTANT

## 2023-07-19 PROCEDURE — 1160F PR REVIEW ALL MEDS BY PRESCRIBER/CLIN PHARMACIST DOCUMENTED: ICD-10-PCS | Mod: CPTII,95,, | Performed by: PHYSICIAN ASSISTANT

## 2023-07-19 PROCEDURE — 99213 OFFICE O/P EST LOW 20 MIN: CPT | Mod: 95,,, | Performed by: PHYSICIAN ASSISTANT

## 2023-07-19 PROCEDURE — 1159F MED LIST DOCD IN RCRD: CPT | Mod: CPTII,95,, | Performed by: PHYSICIAN ASSISTANT

## 2023-07-19 PROCEDURE — 99213 PR OFFICE/OUTPT VISIT, EST, LEVL III, 20-29 MIN: ICD-10-PCS | Mod: 95,,, | Performed by: PHYSICIAN ASSISTANT

## 2023-07-19 PROCEDURE — 1159F PR MEDICATION LIST DOCUMENTED IN MEDICAL RECORD: ICD-10-PCS | Mod: CPTII,95,, | Performed by: PHYSICIAN ASSISTANT

## 2023-07-19 RX ORDER — METHOCARBAMOL 750 MG/1
750 TABLET, FILM COATED ORAL 3 TIMES DAILY
Qty: 60 TABLET | Refills: 0 | Status: SHIPPED | OUTPATIENT
Start: 2023-07-19 | End: 2023-08-08

## 2023-07-19 NOTE — PROGRESS NOTES
Established Patient - Audio Only Telehealth Visit     The patient location is: home  The chief complaint leading to consultation is: MRI results  Visit type: Virtual visit with audio only (telephone)  Total time spent with patient: 10 minutes       The reason for the audio only service rather than synchronous audio and video virtual visit was related to technical difficulties or patient preference/necessity.     Each patient to whom I provide medical services by telemedicine is:  (1) informed of the relationship between the physician and patient and the respective role of any other health care provider with respect to management of the patient; and (2) notified that they may decline to receive medical services by telemedicine and may withdraw from such care at any time. Patient verbally consented to receive this service via voice-only telephone call.       HPI: the patient presents for MRI results.  She saw Dr. Minor 7/3.  She is having neck and left arm pain.  She says the periscapular pain is bothering her the most.      MRI cervical spine demonstrates a mild left sided disc protrusion at C5/6 without significant spinal canal or foraminal narrowing.       Assessment and plan:      There is no surgical intervention indicated.  She would like to hold off on PT until after her surgery in August.  She will continue gabapentin and try robaxin as well.  Follow up as needed.                         This service was not originating from a related E/M service provided within the previous 7 days nor will  to an E/M service or procedure within the next 24 hours or my soonest available appointment.  Prevailing standard of care was able to be met in this audio-only visit.

## 2023-07-20 ENCOUNTER — OFFICE VISIT (OUTPATIENT)
Dept: ORTHOPEDICS | Facility: CLINIC | Age: 23
End: 2023-07-20
Payer: MEDICAID

## 2023-07-20 ENCOUNTER — HOSPITAL ENCOUNTER (OUTPATIENT)
Dept: RADIOLOGY | Facility: HOSPITAL | Age: 23
Discharge: HOME | End: 2023-07-20
Attending: ORTHOPAEDIC SURGERY
Payer: MEDICAID

## 2023-07-20 VITALS — BODY MASS INDEX: 45.7 KG/M2 | WEIGHT: 284.38 LBS | HEIGHT: 66 IN

## 2023-07-20 DIAGNOSIS — M43.16 SPONDYLOLISTHESIS AT L4-L5 LEVEL: Primary | ICD-10-CM

## 2023-07-20 DIAGNOSIS — M43.16 SPONDYLOLISTHESIS AT L4-L5 LEVEL: ICD-10-CM

## 2023-07-20 PROCEDURE — 99499 NO LOS: ICD-10-PCS | Mod: S$PBB,,, | Performed by: ORTHOPAEDIC SURGERY

## 2023-07-20 PROCEDURE — 72100 XR LUMBAR SPINE AP AND LATERAL: ICD-10-PCS | Mod: 26,,, | Performed by: RADIOLOGY

## 2023-07-20 PROCEDURE — 99213 OFFICE O/P EST LOW 20 MIN: CPT | Mod: PBBFAC | Performed by: ORTHOPAEDIC SURGERY

## 2023-07-20 PROCEDURE — 99999 PR PBB SHADOW E&M-EST. PATIENT-LVL III: CPT | Mod: PBBFAC,,, | Performed by: ORTHOPAEDIC SURGERY

## 2023-07-20 PROCEDURE — 99499 UNLISTED E&M SERVICE: CPT | Mod: S$PBB,,, | Performed by: ORTHOPAEDIC SURGERY

## 2023-07-20 PROCEDURE — 72100 X-RAY EXAM L-S SPINE 2/3 VWS: CPT | Mod: 26,,, | Performed by: RADIOLOGY

## 2023-07-20 PROCEDURE — 99999 PR PBB SHADOW E&M-EST. PATIENT-LVL III: ICD-10-PCS | Mod: PBBFAC,,, | Performed by: ORTHOPAEDIC SURGERY

## 2023-07-20 PROCEDURE — 72100 X-RAY EXAM L-S SPINE 2/3 VWS: CPT | Mod: TC

## 2023-07-20 NOTE — H&P (VIEW-ONLY)
Nilay is here for a preop for spondy and L4-5 fusion and TLIF.  He is transitioning and is on hormone treatment.  Has lost 80lbs. Feel the best he has felt physically and emotionally, in years.  Feel normal.    No outpatient medications have been marked as taking for the 7/20/23 encounter (Appointment) with Vicente Min MD.       Review of Symptoms: No fevers or neuro changess  Active Ambulatory Problems     Diagnosis Date Noted    Overactive bladder 04/05/2013    Incomplete bladder emptying 04/05/2013    Constipation - functional 05/13/2013    Overweight 08/13/2013    Family history of hearing loss 06/22/2015    Family history of hypothyroidism 06/22/2015    Dandruff 09/14/2015    Vasovagal syncope 08/13/2019    Family history of early death 08/13/2019    Chronic pain of both knees 08/13/2019    Patellofemoral pain syndrome of both knees 08/13/2019    Spondylolisthesis at L4-L5 level 10/04/2019    Spondylisthesis 07/14/2020    Severe episode of recurrent major depressive disorder, without psychotic features 09/02/2020    Gastroesophageal reflux disease without esophagitis 09/02/2020    Pain 02/02/2021    Seasonal allergic rhinitis 02/20/2021    Decreased ROM of lumbar spine 05/10/2022    Decreased strength of trunk and back 05/10/2022    Impaired functional mobility, balance, gait, and endurance 05/10/2022    Bilateral hand pain 05/16/2023    Decreased  strength 05/16/2023     Resolved Ambulatory Problems     Diagnosis Date Noted    Spondylolysis of lumbar region 10/04/2019    Chronic low back pain 10/18/2019    Decreased strength of trunk and back 10/18/2019    Encounter for medical screening examination 09/02/2020    Depression with suicidal ideation 09/19/2020    Depression with suicidal ideation 02/19/2021    Decreased strength of trunk and back 12/09/2021    Weakness of both lower extremities 05/10/2022     Past Medical History:   Diagnosis Date    Depression     Scoliosis     Spondylolysis     Urinary  tract infection        Physical Exam    Patient alert and oriented  Obese  All extremities pink and warm with good cap refill and no edema.   Gait normal.    Motor exam  And dtr  lower extremities intact  Back shows full rom.  Rotation and deformity Obese, no real deformity  Xrays today show likely nonunion with loosening of L4 screws.  No migration of cage or loss of correction.    Also seen on recent abdominal CT, with screw migration.     Nonunion L4 spondy    Plan   Plan for revision of L4-5 fusion posteriorly with Infuse.  With the cage appearing stable and no neuro symptoms, will not decompress or revise the cage.  Nilay shows good understanding of risks and indications.

## 2023-08-04 ENCOUNTER — PATIENT MESSAGE (OUTPATIENT)
Dept: ORTHOPEDICS | Facility: CLINIC | Age: 23
End: 2023-08-04
Payer: MEDICARE

## 2023-08-07 ENCOUNTER — TELEPHONE (OUTPATIENT)
Dept: ORTHOPEDICS | Facility: CLINIC | Age: 23
End: 2023-08-07
Payer: MEDICARE

## 2023-08-07 DIAGNOSIS — M54.50 CHRONIC BILATERAL LOW BACK PAIN WITHOUT SCIATICA: ICD-10-CM

## 2023-08-07 DIAGNOSIS — G89.29 CHRONIC BILATERAL LOW BACK PAIN WITHOUT SCIATICA: ICD-10-CM

## 2023-08-07 DIAGNOSIS — Z01.818 PRE-OP TESTING: ICD-10-CM

## 2023-08-07 DIAGNOSIS — M43.16 SPONDYLOLISTHESIS AT L4-L5 LEVEL: Primary | ICD-10-CM

## 2023-08-07 RX ORDER — FENTANYL CITRATE 50 UG/ML
25 INJECTION, SOLUTION INTRAMUSCULAR; INTRAVENOUS EVERY 5 MIN PRN
Status: CANCELLED | OUTPATIENT
Start: 2023-08-07

## 2023-08-07 RX ORDER — PROCHLORPERAZINE EDISYLATE 5 MG/ML
5 INJECTION INTRAMUSCULAR; INTRAVENOUS EVERY 30 MIN PRN
Status: CANCELLED | OUTPATIENT
Start: 2023-08-07

## 2023-08-07 NOTE — TELEPHONE ENCOUNTER
----- Message from Vicente Min MD sent at 8/6/2023  8:45 PM CDT -----  Regarding: Needs Labs!  Nilay needs preop labs including an A1C.  Will cancel her surgery if we don't get those.  She is older and obese and bad things can happen.  CMP, CBC, CMP, A1C and PT PTT. Thank you!    Vicente

## 2023-08-07 NOTE — PRE-PROCEDURE INSTRUCTIONS
PREOP INSTRUCTIONS:  No food,milk or milk products for 8 hours before surgery.  Morning medications may be taken with a sip of water  Instructed to follow the surgeon's instructions if they differ from these.  Shower instructions as well as directions to the Surgery Center were given.  Encouraged to wear loose fitting,comfortable clothing.  Medication instructions for pm prior to and am of procedure reviewed.  Instructed to avoid taking vitamins,supplements,aspirin and ibuprofen the morning of surgery.    Patient denies any side effects or issues with anesthesia or sedation.

## 2023-08-07 NOTE — TELEPHONE ENCOUNTER
RN spoke with Nilay and discussed need for labs today. Initially Nilay said she would not be able to get to clinic to have labs done. RN explained importance of getting labs today to be able to proceed with surgery tomorrow. RN said Nilay could get labs at any [facility] location so Nilay opted to have labs done today at [facility]. CBC, CMP, PT, PTT, INR, & A1C ordered. RN explained if labs are not within acceptable range Dr. Min may have to cancel tomorrow's surgery. Discussed importance of getting labs sooner today, rather than late this afternoon. Nilay stated understanding.

## 2023-08-08 ENCOUNTER — HOSPITAL ENCOUNTER (INPATIENT)
Facility: HOSPITAL | Age: 23
LOS: 1 days | Discharge: HOME | DRG: 552 | End: 2023-08-08
Attending: ORTHOPAEDIC SURGERY | Admitting: ORTHOPAEDIC SURGERY
Payer: MEDICARE

## 2023-08-08 VITALS
HEIGHT: 66 IN | HEART RATE: 97 BPM | BODY MASS INDEX: 36.96 KG/M2 | RESPIRATION RATE: 20 BRPM | DIASTOLIC BLOOD PRESSURE: 75 MMHG | SYSTOLIC BLOOD PRESSURE: 115 MMHG | WEIGHT: 230 LBS | TEMPERATURE: 98 F | OXYGEN SATURATION: 97 %

## 2023-08-08 DIAGNOSIS — M43.10 SPONDYLOLISTHESIS: ICD-10-CM

## 2023-08-08 PROCEDURE — 27201037 HC PRESSURE MONITORING SET UP

## 2023-08-08 PROCEDURE — 12000002 HC ACUTE/MED SURGE SEMI-PRIVATE ROOM

## 2023-08-08 PROCEDURE — 99499 NO LOS: ICD-10-PCS | Mod: ,,, | Performed by: ORTHOPAEDIC SURGERY

## 2023-08-08 PROCEDURE — 25000003 PHARM REV CODE 250: Performed by: STUDENT IN AN ORGANIZED HEALTH CARE EDUCATION/TRAINING PROGRAM

## 2023-08-08 PROCEDURE — 99499 UNLISTED E&M SERVICE: CPT | Mod: ,,, | Performed by: ORTHOPAEDIC SURGERY

## 2023-08-08 RX ORDER — ACETAMINOPHEN 500 MG
1000 TABLET ORAL ONCE
Status: COMPLETED | OUTPATIENT
Start: 2023-08-08 | End: 2023-08-08

## 2023-08-08 RX ADMIN — ACETAMINOPHEN 1000 MG: 500 TABLET ORAL at 11:08

## 2023-08-08 NOTE — DISCHARGE SUMMARY
[facility] - Surgery (2nd Fl)  Discharge Note  Short Stay    Procedure(s) (LRB):  FUSION, SPINE, WITH INSTRUMENTATION-Depuy 5.5-revision (N/A)      OUTCOME:  Surgery not performed and will reschedule due to timing.    DISPOSITION: Home or Self Care    FINAL DIAGNOSIS:  Spondylolisthesis at L4-L5 level    FOLLOWUP: In clinic    DISCHARGE INSTRUCTIONS:  No discharge procedures on file.     TIME SPENT ON DISCHARGE: 5 minutes

## 2023-08-08 NOTE — CARE UPDATE
Dr. Coy at bedside. O.K. to give pt. Clear liquids for the next 15 min.'s due to a delay in starting surgery.

## 2023-08-08 NOTE — CARE UPDATE
Pt. Was given sprite & water at 1250 and instructed to stop drinking at 1300. Verbals understanding.

## 2023-08-08 NOTE — PROGRESS NOTES
After discussion with MD Anneliese at the bedside, procedure cancelled due to complications in previous case ahead of patient and staff availability for procedure. IV to be removed and to discharge Pt home.

## 2023-08-10 ENCOUNTER — PATIENT MESSAGE (OUTPATIENT)
Dept: ORTHOPEDICS | Facility: CLINIC | Age: 23
End: 2023-08-10
Payer: MEDICARE

## 2023-08-14 DIAGNOSIS — M43.16 SPONDYLOLISTHESIS AT L4-L5 LEVEL: Primary | ICD-10-CM

## 2023-08-16 ENCOUNTER — PATIENT MESSAGE (OUTPATIENT)
Dept: ORTHOPEDICS | Facility: CLINIC | Age: 23
End: 2023-08-16
Payer: MEDICARE

## 2023-08-22 ENCOUNTER — PATIENT MESSAGE (OUTPATIENT)
Dept: ADMINISTRATIVE | Facility: OTHER | Age: 23
End: 2023-08-22
Payer: MEDICARE

## 2023-08-25 ENCOUNTER — PATIENT MESSAGE (OUTPATIENT)
Dept: ORTHOPEDICS | Facility: CLINIC | Age: 23
End: 2023-08-25
Payer: MEDICARE

## 2023-08-28 ENCOUNTER — ANESTHESIA EVENT (OUTPATIENT)
Dept: SURGERY | Facility: HOSPITAL | Age: 23
DRG: 460 | End: 2023-08-28
Payer: MEDICARE

## 2023-08-29 ENCOUNTER — HOSPITAL ENCOUNTER (INPATIENT)
Facility: HOSPITAL | Age: 23
LOS: 2 days | Discharge: HOME HEALTH | DRG: 460 | End: 2023-08-31
Attending: ORTHOPAEDIC SURGERY | Admitting: ORTHOPAEDIC SURGERY
Payer: MEDICARE

## 2023-08-29 ENCOUNTER — ANESTHESIA (OUTPATIENT)
Dept: SURGERY | Facility: HOSPITAL | Age: 23
DRG: 460 | End: 2023-08-29
Payer: MEDICARE

## 2023-08-29 DIAGNOSIS — M43.16 SPONDYLOLISTHESIS AT L4-L5 LEVEL: Primary | ICD-10-CM

## 2023-08-29 DIAGNOSIS — G89.29 CHRONIC BILATERAL LOW BACK PAIN WITHOUT SCIATICA: ICD-10-CM

## 2023-08-29 DIAGNOSIS — M54.50 CHRONIC BILATERAL LOW BACK PAIN WITHOUT SCIATICA: ICD-10-CM

## 2023-08-29 LAB
ABO + RH BLD: NORMAL
ANION GAP SERPL CALC-SCNC: 8 MMOL/L (ref 8–16)
BASOPHILS # BLD AUTO: 0.03 K/UL (ref 0–0.2)
BASOPHILS NFR BLD: 0.3 % (ref 0–1.9)
BLD GP AB SCN CELLS X3 SERPL QL: NORMAL
BUN SERPL-MCNC: 9 MG/DL (ref 6–20)
CALCIUM SERPL-MCNC: 8.5 MG/DL (ref 8.7–10.5)
CHLORIDE SERPL-SCNC: 106 MMOL/L (ref 95–110)
CO2 SERPL-SCNC: 23 MMOL/L (ref 23–29)
CREAT SERPL-MCNC: 1.1 MG/DL (ref 0.5–1.4)
DIFFERENTIAL METHOD: ABNORMAL
EOSINOPHIL # BLD AUTO: 0 K/UL (ref 0–0.5)
EOSINOPHIL NFR BLD: 0.1 % (ref 0–8)
ERYTHROCYTE [DISTWIDTH] IN BLOOD BY AUTOMATED COUNT: 11.9 % (ref 11.5–14.5)
EST. GFR  (NO RACE VARIABLE): >60 ML/MIN/1.73 M^2
GLUCOSE SERPL-MCNC: 144 MG/DL (ref 70–110)
HCT VFR BLD AUTO: 43.9 % (ref 37–48.5)
HGB BLD-MCNC: 14.5 G/DL (ref 12–16)
IMM GRANULOCYTES # BLD AUTO: 0.04 K/UL (ref 0–0.04)
IMM GRANULOCYTES NFR BLD AUTO: 0.3 % (ref 0–0.5)
LYMPHOCYTES # BLD AUTO: 0.7 K/UL (ref 1–4.8)
LYMPHOCYTES NFR BLD: 6.2 % (ref 18–48)
MCH RBC QN AUTO: 30.3 PG (ref 27–31)
MCHC RBC AUTO-ENTMCNC: 33 G/DL (ref 32–36)
MCV RBC AUTO: 92 FL (ref 82–98)
MONOCYTES # BLD AUTO: 0.4 K/UL (ref 0.3–1)
MONOCYTES NFR BLD: 3.2 % (ref 4–15)
NEUTROPHILS # BLD AUTO: 10.5 K/UL (ref 1.8–7.7)
NEUTROPHILS NFR BLD: 89.9 % (ref 38–73)
NRBC BLD-RTO: 0 /100 WBC
PLATELET # BLD AUTO: 219 K/UL (ref 150–450)
PMV BLD AUTO: 9.4 FL (ref 9.2–12.9)
POTASSIUM SERPL-SCNC: 4.3 MMOL/L (ref 3.5–5.1)
RBC # BLD AUTO: 4.79 M/UL (ref 4–5.4)
SODIUM SERPL-SCNC: 137 MMOL/L (ref 136–145)
SPECIMEN OUTDATE: NORMAL
WBC # BLD AUTO: 11.7 K/UL (ref 3.9–12.7)

## 2023-08-29 PROCEDURE — 22614 ARTHRD PST TQ 1NTRSPC EA ADD: CPT | Mod: GC,,, | Performed by: ORTHOPAEDIC SURGERY

## 2023-08-29 PROCEDURE — 63600175 PHARM REV CODE 636 W HCPCS: Performed by: STUDENT IN AN ORGANIZED HEALTH CARE EDUCATION/TRAINING PROGRAM

## 2023-08-29 PROCEDURE — 20930 PR ALLOGRAFT FOR SPINE SURGERY ONLY MORSELIZED: ICD-10-PCS | Mod: GC,,, | Performed by: ORTHOPAEDIC SURGERY

## 2023-08-29 PROCEDURE — 86900 BLOOD TYPING SEROLOGIC ABO: CPT | Performed by: ORTHOPAEDIC SURGERY

## 2023-08-29 PROCEDURE — 27800903 OPTIME MED/SURG SUP & DEVICES OTHER IMPLANTS: Performed by: ORTHOPAEDIC SURGERY

## 2023-08-29 PROCEDURE — 36620 ARTERIAL: ICD-10-PCS | Mod: 59,,, | Performed by: ANESTHESIOLOGY

## 2023-08-29 PROCEDURE — 37000009 HC ANESTHESIA EA ADD 15 MINS: Performed by: ORTHOPAEDIC SURGERY

## 2023-08-29 PROCEDURE — 36000711: Performed by: ORTHOPAEDIC SURGERY

## 2023-08-29 PROCEDURE — 25000003 PHARM REV CODE 250: Performed by: STUDENT IN AN ORGANIZED HEALTH CARE EDUCATION/TRAINING PROGRAM

## 2023-08-29 PROCEDURE — 11000001 HC ACUTE MED/SURG PRIVATE ROOM

## 2023-08-29 PROCEDURE — 25000003 PHARM REV CODE 250: Performed by: ORTHOPAEDIC SURGERY

## 2023-08-29 PROCEDURE — C1729 CATH, DRAINAGE: HCPCS | Performed by: ORTHOPAEDIC SURGERY

## 2023-08-29 PROCEDURE — 25000003 PHARM REV CODE 250

## 2023-08-29 PROCEDURE — 63600175 PHARM REV CODE 636 W HCPCS: Performed by: NURSE ANESTHETIST, CERTIFIED REGISTERED

## 2023-08-29 PROCEDURE — 20930 SP BONE ALGRFT MORSEL ADD-ON: CPT | Mod: GC,,, | Performed by: ORTHOPAEDIC SURGERY

## 2023-08-29 PROCEDURE — 80048 BASIC METABOLIC PNL TOTAL CA: CPT | Performed by: STUDENT IN AN ORGANIZED HEALTH CARE EDUCATION/TRAINING PROGRAM

## 2023-08-29 PROCEDURE — 21400001 HC TELEMETRY ROOM

## 2023-08-29 PROCEDURE — 85025 COMPLETE CBC W/AUTO DIFF WBC: CPT | Performed by: STUDENT IN AN ORGANIZED HEALTH CARE EDUCATION/TRAINING PROGRAM

## 2023-08-29 PROCEDURE — 25000003 PHARM REV CODE 250: Performed by: NURSE ANESTHETIST, CERTIFIED REGISTERED

## 2023-08-29 PROCEDURE — 36000710: Performed by: ORTHOPAEDIC SURGERY

## 2023-08-29 PROCEDURE — D9220A PRA ANESTHESIA: ICD-10-PCS | Mod: ANES,,, | Performed by: ANESTHESIOLOGY

## 2023-08-29 PROCEDURE — 63600175 PHARM REV CODE 636 W HCPCS: Performed by: ORTHOPAEDIC SURGERY

## 2023-08-29 PROCEDURE — 27201423 OPTIME MED/SURG SUP & DEVICES STERILE SUPPLY: Performed by: ORTHOPAEDIC SURGERY

## 2023-08-29 PROCEDURE — 71000015 HC POSTOP RECOV 1ST HR: Performed by: ORTHOPAEDIC SURGERY

## 2023-08-29 PROCEDURE — 63600175 PHARM REV CODE 636 W HCPCS: Performed by: ANESTHESIOLOGY

## 2023-08-29 PROCEDURE — 22612 PR ARTHRODESIS, POST/POSTLAT, SNGL INTERSPACE, LUMBAR: ICD-10-PCS | Mod: GC,,, | Performed by: ORTHOPAEDIC SURGERY

## 2023-08-29 PROCEDURE — D9220A PRA ANESTHESIA: Mod: CRNA,,, | Performed by: NURSE ANESTHETIST, CERTIFIED REGISTERED

## 2023-08-29 PROCEDURE — 94761 N-INVAS EAR/PLS OXIMETRY MLT: CPT

## 2023-08-29 PROCEDURE — 36620 INSERTION CATHETER ARTERY: CPT | Mod: 59,,, | Performed by: ANESTHESIOLOGY

## 2023-08-29 PROCEDURE — C1713 ANCHOR/SCREW BN/BN,TIS/BN: HCPCS | Performed by: ORTHOPAEDIC SURGERY

## 2023-08-29 PROCEDURE — 71000016 HC POSTOP RECOV ADDL HR: Performed by: ORTHOPAEDIC SURGERY

## 2023-08-29 PROCEDURE — 63600175 PHARM REV CODE 636 W HCPCS

## 2023-08-29 PROCEDURE — D9220A PRA ANESTHESIA: ICD-10-PCS | Mod: CRNA,,, | Performed by: NURSE ANESTHETIST, CERTIFIED REGISTERED

## 2023-08-29 PROCEDURE — 22612 ARTHRD PST TQ 1NTRSPC LUMBAR: CPT | Mod: GC,,, | Performed by: ORTHOPAEDIC SURGERY

## 2023-08-29 PROCEDURE — 37000008 HC ANESTHESIA 1ST 15 MINUTES: Performed by: ORTHOPAEDIC SURGERY

## 2023-08-29 PROCEDURE — 71000033 HC RECOVERY, INTIAL HOUR: Performed by: ORTHOPAEDIC SURGERY

## 2023-08-29 PROCEDURE — D9220A PRA ANESTHESIA: Mod: ANES,,, | Performed by: ANESTHESIOLOGY

## 2023-08-29 PROCEDURE — 22614 PR ARTHRODESIS, POST/POSTLAT, SNGL INTERSPACE, EA ADDTL: ICD-10-PCS | Mod: GC,,, | Performed by: ORTHOPAEDIC SURGERY

## 2023-08-29 PROCEDURE — 22842 INSERT SPINE FIXATION DEVICE: CPT | Mod: GC,,, | Performed by: ORTHOPAEDIC SURGERY

## 2023-08-29 PROCEDURE — 22842 PR POSTERIOR SEGMENTAL INSTRUMENTATION 3-6 VRT SEG: ICD-10-PCS | Mod: GC,,, | Performed by: ORTHOPAEDIC SURGERY

## 2023-08-29 DEVICE — SCREW BONE SPINAL 5.5 6 X 45MM: Type: IMPLANTABLE DEVICE | Site: SPINE LUMBAR | Status: FUNCTIONAL

## 2023-08-29 DEVICE — KIT BONE GRFT BMP SM: Type: IMPLANTABLE DEVICE | Site: SPINE LUMBAR | Status: FUNCTIONAL

## 2023-08-29 DEVICE — ROD SPINAL EXPEDIUM 65MM: Type: IMPLANTABLE DEVICE | Site: SPINE LUMBAR | Status: FUNCTIONAL

## 2023-08-29 DEVICE — SCREW INNER SINGLE SET TITANIU: Type: IMPLANTABLE DEVICE | Site: SPINE LUMBAR | Status: FUNCTIONAL

## 2023-08-29 DEVICE — IMPLANTABLE DEVICE: Type: IMPLANTABLE DEVICE | Site: SPINE LUMBAR | Status: FUNCTIONAL

## 2023-08-29 RX ORDER — ONDANSETRON 2 MG/ML
4 INJECTION INTRAMUSCULAR; INTRAVENOUS DAILY PRN
Status: DISCONTINUED | OUTPATIENT
Start: 2023-08-29 | End: 2023-08-29 | Stop reason: HOSPADM

## 2023-08-29 RX ORDER — SODIUM CHLORIDE 0.9 % (FLUSH) 0.9 %
10 SYRINGE (ML) INJECTION
Status: DISCONTINUED | OUTPATIENT
Start: 2023-08-29 | End: 2023-08-31 | Stop reason: HOSPADM

## 2023-08-29 RX ORDER — ROCURONIUM BROMIDE 10 MG/ML
INJECTION, SOLUTION INTRAVENOUS
Status: DISCONTINUED | OUTPATIENT
Start: 2023-08-29 | End: 2023-08-29

## 2023-08-29 RX ORDER — LAMOTRIGINE 100 MG/1
300 TABLET, EXTENDED RELEASE ORAL NIGHTLY
Status: DISCONTINUED | OUTPATIENT
Start: 2023-08-29 | End: 2023-08-31 | Stop reason: HOSPADM

## 2023-08-29 RX ORDER — VANCOMYCIN HYDROCHLORIDE 1 G/20ML
INJECTION, POWDER, LYOPHILIZED, FOR SOLUTION INTRAVENOUS
Status: DISCONTINUED | OUTPATIENT
Start: 2023-08-29 | End: 2023-08-29 | Stop reason: HOSPADM

## 2023-08-29 RX ORDER — ACETAMINOPHEN 500 MG
1000 TABLET ORAL EVERY 8 HOURS
Status: DISCONTINUED | OUTPATIENT
Start: 2023-08-29 | End: 2023-08-31 | Stop reason: HOSPADM

## 2023-08-29 RX ORDER — LIDOCAINE HYDROCHLORIDE 10 MG/ML
1 INJECTION, SOLUTION EPIDURAL; INFILTRATION; INTRACAUDAL; PERINEURAL ONCE AS NEEDED
Status: DISCONTINUED | OUTPATIENT
Start: 2023-08-29 | End: 2023-08-29 | Stop reason: HOSPADM

## 2023-08-29 RX ORDER — HYDROMORPHONE HYDROCHLORIDE 1 MG/ML
0.2 INJECTION, SOLUTION INTRAMUSCULAR; INTRAVENOUS; SUBCUTANEOUS EVERY 5 MIN PRN
Status: DISCONTINUED | OUTPATIENT
Start: 2023-08-29 | End: 2023-08-29 | Stop reason: HOSPADM

## 2023-08-29 RX ORDER — AMOXICILLIN 250 MG
1 CAPSULE ORAL 2 TIMES DAILY
Qty: 14 TABLET | Refills: 0 | Status: SHIPPED | OUTPATIENT
Start: 2023-08-29 | End: 2023-09-07

## 2023-08-29 RX ORDER — MIRTAZAPINE 15 MG/1
30 TABLET, FILM COATED ORAL NIGHTLY
Status: DISCONTINUED | OUTPATIENT
Start: 2023-08-29 | End: 2023-08-31 | Stop reason: HOSPADM

## 2023-08-29 RX ORDER — CLINDAMYCIN PHOSPHATE 900 MG/50ML
900 INJECTION, SOLUTION INTRAVENOUS
Status: DISCONTINUED | OUTPATIENT
Start: 2023-08-29 | End: 2023-08-29 | Stop reason: HOSPADM

## 2023-08-29 RX ORDER — ONDANSETRON 2 MG/ML
4 INJECTION INTRAMUSCULAR; INTRAVENOUS EVERY 12 HOURS PRN
Status: DISCONTINUED | OUTPATIENT
Start: 2023-08-29 | End: 2023-08-31 | Stop reason: HOSPADM

## 2023-08-29 RX ORDER — TRANEXAMIC ACID 100 MG/ML
INJECTION, SOLUTION INTRAVENOUS
Status: DISCONTINUED | OUTPATIENT
Start: 2023-08-29 | End: 2023-08-29

## 2023-08-29 RX ORDER — FAMOTIDINE 20 MG/1
40 TABLET, FILM COATED ORAL DAILY
Status: DISCONTINUED | OUTPATIENT
Start: 2023-08-30 | End: 2023-08-31 | Stop reason: HOSPADM

## 2023-08-29 RX ORDER — LAMOTRIGINE 150 MG/1
150 TABLET ORAL NIGHTLY
Status: DISCONTINUED | OUTPATIENT
Start: 2023-08-29 | End: 2023-08-29

## 2023-08-29 RX ORDER — HYDROMORPHONE HYDROCHLORIDE 1 MG/ML
INJECTION, SOLUTION INTRAMUSCULAR; INTRAVENOUS; SUBCUTANEOUS
Status: COMPLETED
Start: 2023-08-29 | End: 2023-08-29

## 2023-08-29 RX ORDER — PROPOFOL 10 MG/ML
VIAL (ML) INTRAVENOUS
Status: DISCONTINUED | OUTPATIENT
Start: 2023-08-29 | End: 2023-08-29

## 2023-08-29 RX ORDER — MIDAZOLAM HYDROCHLORIDE 1 MG/ML
INJECTION INTRAMUSCULAR; INTRAVENOUS
Status: DISCONTINUED | OUTPATIENT
Start: 2023-08-29 | End: 2023-08-29

## 2023-08-29 RX ORDER — BUPIVACAINE HYDROCHLORIDE 2.5 MG/ML
INJECTION, SOLUTION EPIDURAL; INFILTRATION; INTRACAUDAL
Status: DISCONTINUED | OUTPATIENT
Start: 2023-08-29 | End: 2023-08-29 | Stop reason: HOSPADM

## 2023-08-29 RX ORDER — DEXTROMETHORPHAN HYDROBROMIDE, GUAIFENESIN 5; 100 MG/5ML; MG/5ML
650 LIQUID ORAL EVERY 8 HOURS
Qty: 42 TABLET | Refills: 0 | Status: SHIPPED | OUTPATIENT
Start: 2023-08-29 | End: 2023-09-14

## 2023-08-29 RX ORDER — DEXMEDETOMIDINE HYDROCHLORIDE 100 UG/ML
INJECTION, SOLUTION INTRAVENOUS
Status: DISCONTINUED | OUTPATIENT
Start: 2023-08-29 | End: 2023-08-29

## 2023-08-29 RX ORDER — METHOCARBAMOL 750 MG/1
750 TABLET, FILM COATED ORAL 3 TIMES DAILY PRN
Qty: 42 TABLET | Refills: 0 | Status: SHIPPED | OUTPATIENT
Start: 2023-08-29 | End: 2023-09-14

## 2023-08-29 RX ORDER — EPHEDRINE SULFATE 50 MG/ML
INJECTION, SOLUTION INTRAVENOUS
Status: DISCONTINUED | OUTPATIENT
Start: 2023-08-29 | End: 2023-08-29

## 2023-08-29 RX ORDER — TALC
6 POWDER (GRAM) TOPICAL NIGHTLY PRN
Status: DISCONTINUED | OUTPATIENT
Start: 2023-08-29 | End: 2023-08-31 | Stop reason: HOSPADM

## 2023-08-29 RX ORDER — FENTANYL CITRATE 50 UG/ML
INJECTION, SOLUTION INTRAMUSCULAR; INTRAVENOUS
Status: DISCONTINUED | OUTPATIENT
Start: 2023-08-29 | End: 2023-08-29

## 2023-08-29 RX ORDER — SODIUM CHLORIDE 9 MG/ML
INJECTION, SOLUTION INTRAVENOUS CONTINUOUS
Status: DISCONTINUED | OUTPATIENT
Start: 2023-08-29 | End: 2023-08-31 | Stop reason: HOSPADM

## 2023-08-29 RX ORDER — FAMOTIDINE 40 MG/1
40 TABLET, FILM COATED ORAL DAILY
COMMUNITY

## 2023-08-29 RX ORDER — SODIUM CHLORIDE 0.9 % (FLUSH) 0.9 %
3 SYRINGE (ML) INJECTION EVERY 30 MIN PRN
Status: DISCONTINUED | OUTPATIENT
Start: 2023-08-29 | End: 2023-08-29 | Stop reason: HOSPADM

## 2023-08-29 RX ORDER — POLYETHYLENE GLYCOL 3350 17 G/17G
17 POWDER, FOR SOLUTION ORAL DAILY
Status: DISCONTINUED | OUTPATIENT
Start: 2023-08-29 | End: 2023-08-31 | Stop reason: HOSPADM

## 2023-08-29 RX ORDER — GABAPENTIN 300 MG/1
300 CAPSULE ORAL 3 TIMES DAILY
Status: DISCONTINUED | OUTPATIENT
Start: 2023-08-29 | End: 2023-08-29

## 2023-08-29 RX ORDER — OXYCODONE HYDROCHLORIDE 5 MG/1
5 TABLET ORAL EVERY 6 HOURS PRN
Qty: 28 TABLET | Refills: 0 | Status: SHIPPED | OUTPATIENT
Start: 2023-08-29 | End: 2023-09-07

## 2023-08-29 RX ORDER — ACETAMINOPHEN 10 MG/ML
INJECTION, SOLUTION INTRAVENOUS
Status: DISCONTINUED | OUTPATIENT
Start: 2023-08-29 | End: 2023-08-29

## 2023-08-29 RX ORDER — MUPIROCIN 20 MG/G
OINTMENT TOPICAL 2 TIMES DAILY
Status: DISCONTINUED | OUTPATIENT
Start: 2023-08-29 | End: 2023-08-31 | Stop reason: HOSPADM

## 2023-08-29 RX ORDER — BISACODYL 10 MG
10 SUPPOSITORY, RECTAL RECTAL DAILY PRN
Status: DISCONTINUED | OUTPATIENT
Start: 2023-08-29 | End: 2023-08-31 | Stop reason: HOSPADM

## 2023-08-29 RX ORDER — HYOSCYAMINE SULFATE 0.12 MG/ML
0.12 LIQUID ORAL EVERY 6 HOURS PRN
Status: DISCONTINUED | OUTPATIENT
Start: 2023-08-29 | End: 2023-08-31 | Stop reason: HOSPADM

## 2023-08-29 RX ORDER — METOCLOPRAMIDE HYDROCHLORIDE 5 MG/ML
5 INJECTION INTRAMUSCULAR; INTRAVENOUS EVERY 6 HOURS PRN
Status: DISCONTINUED | OUTPATIENT
Start: 2023-08-29 | End: 2023-08-29

## 2023-08-29 RX ORDER — LAMOTRIGINE 100 MG/1
300 TABLET, EXTENDED RELEASE ORAL NIGHTLY
Status: DISCONTINUED | OUTPATIENT
Start: 2023-08-29 | End: 2023-08-29

## 2023-08-29 RX ORDER — OXYCODONE HYDROCHLORIDE 10 MG/1
10 TABLET ORAL EVERY 4 HOURS PRN
Status: DISCONTINUED | OUTPATIENT
Start: 2023-08-29 | End: 2023-08-30

## 2023-08-29 RX ORDER — CEFAZOLIN SODIUM 1 G/3ML
INJECTION, POWDER, FOR SOLUTION INTRAMUSCULAR; INTRAVENOUS
Status: DISCONTINUED | OUTPATIENT
Start: 2023-08-29 | End: 2023-08-29

## 2023-08-29 RX ORDER — OXYCODONE HYDROCHLORIDE 5 MG/1
5 TABLET ORAL EVERY 4 HOURS PRN
Status: DISCONTINUED | OUTPATIENT
Start: 2023-08-29 | End: 2023-08-30

## 2023-08-29 RX ORDER — DOCUSATE SODIUM 100 MG/1
100 CAPSULE, LIQUID FILLED ORAL 2 TIMES DAILY
Status: DISCONTINUED | OUTPATIENT
Start: 2023-08-29 | End: 2023-08-31 | Stop reason: HOSPADM

## 2023-08-29 RX ORDER — HEPARIN SODIUM 5000 [USP'U]/ML
5000 INJECTION, SOLUTION INTRAVENOUS; SUBCUTANEOUS EVERY 8 HOURS
Status: DISCONTINUED | OUTPATIENT
Start: 2023-08-30 | End: 2023-08-31 | Stop reason: HOSPADM

## 2023-08-29 RX ORDER — ONDANSETRON 2 MG/ML
INJECTION INTRAMUSCULAR; INTRAVENOUS
Status: DISCONTINUED | OUTPATIENT
Start: 2023-08-29 | End: 2023-08-29

## 2023-08-29 RX ORDER — CELECOXIB 200 MG/1
200 CAPSULE ORAL DAILY PRN
Qty: 30 CAPSULE | Refills: 1 | Status: SHIPPED | OUTPATIENT
Start: 2023-08-29

## 2023-08-29 RX ORDER — KETAMINE HCL IN 0.9 % NACL 50 MG/5 ML
SYRINGE (ML) INTRAVENOUS
Status: DISCONTINUED | OUTPATIENT
Start: 2023-08-29 | End: 2023-08-29

## 2023-08-29 RX ORDER — MORPHINE SULFATE 4 MG/ML
4 INJECTION, SOLUTION INTRAMUSCULAR; INTRAVENOUS
Status: DISCONTINUED | OUTPATIENT
Start: 2023-08-29 | End: 2023-08-31 | Stop reason: HOSPADM

## 2023-08-29 RX ORDER — DEXAMETHASONE SODIUM PHOSPHATE 4 MG/ML
INJECTION, SOLUTION INTRA-ARTICULAR; INTRALESIONAL; INTRAMUSCULAR; INTRAVENOUS; SOFT TISSUE
Status: DISCONTINUED | OUTPATIENT
Start: 2023-08-29 | End: 2023-08-29

## 2023-08-29 RX ORDER — LIDOCAINE HYDROCHLORIDE 20 MG/ML
INJECTION INTRAVENOUS
Status: DISCONTINUED | OUTPATIENT
Start: 2023-08-29 | End: 2023-08-29

## 2023-08-29 RX ORDER — METHOCARBAMOL 750 MG/1
750 TABLET, FILM COATED ORAL 3 TIMES DAILY
Status: DISCONTINUED | OUTPATIENT
Start: 2023-08-29 | End: 2023-08-31 | Stop reason: HOSPADM

## 2023-08-29 RX ORDER — HYOSCYAMINE SULFATE 0.12 MG/5ML
0.12 LIQUID ORAL EVERY 4 HOURS PRN
COMMUNITY

## 2023-08-29 RX ORDER — DEXTROSE MONOHYDRATE, SODIUM CHLORIDE, AND POTASSIUM CHLORIDE 50; 1.49; 9 G/1000ML; G/1000ML; G/1000ML
INJECTION, SOLUTION INTRAVENOUS CONTINUOUS
Status: DISCONTINUED | OUTPATIENT
Start: 2023-08-29 | End: 2023-08-30

## 2023-08-29 RX ORDER — PHENYLEPHRINE HYDROCHLORIDE 10 MG/ML
INJECTION INTRAVENOUS
Status: DISCONTINUED | OUTPATIENT
Start: 2023-08-29 | End: 2023-08-29

## 2023-08-29 RX ADMIN — OXYCODONE HYDROCHLORIDE 10 MG: 10 TABLET ORAL at 05:08

## 2023-08-29 RX ADMIN — SODIUM CHLORIDE, SODIUM GLUCONATE, SODIUM ACETATE, POTASSIUM CHLORIDE, MAGNESIUM CHLORIDE, SODIUM PHOSPHATE, DIBASIC, AND POTASSIUM PHOSPHATE: .53; .5; .37; .037; .03; .012; .00082 INJECTION, SOLUTION INTRAVENOUS at 08:08

## 2023-08-29 RX ADMIN — ACETAMINOPHEN 1000 MG: 500 TABLET ORAL at 07:08

## 2023-08-29 RX ADMIN — TRANEXAMIC ACID 1000 MG: 100 INJECTION INTRAVENOUS at 07:08

## 2023-08-29 RX ADMIN — Medication 30 MG: at 08:08

## 2023-08-29 RX ADMIN — Medication 20 MG: at 09:08

## 2023-08-29 RX ADMIN — HYDROMORPHONE HYDROCHLORIDE 0.2 MG: 1 INJECTION, SOLUTION INTRAMUSCULAR; INTRAVENOUS; SUBCUTANEOUS at 12:08

## 2023-08-29 RX ADMIN — FENTANYL CITRATE 50 MCG: 50 INJECTION, SOLUTION INTRAMUSCULAR; INTRAVENOUS at 12:08

## 2023-08-29 RX ADMIN — POTASSIUM CHLORIDE, DEXTROSE MONOHYDRATE AND SODIUM CHLORIDE: 150; 5; 900 INJECTION, SOLUTION INTRAVENOUS at 09:08

## 2023-08-29 RX ADMIN — PHENYLEPHRINE HYDROCHLORIDE 100 MCG: 10 INJECTION INTRAVENOUS at 08:08

## 2023-08-29 RX ADMIN — ROCURONIUM BROMIDE 50 MG: 10 INJECTION, SOLUTION INTRAVENOUS at 07:08

## 2023-08-29 RX ADMIN — ROCURONIUM BROMIDE 20 MG: 10 INJECTION, SOLUTION INTRAVENOUS at 08:08

## 2023-08-29 RX ADMIN — EPHEDRINE SULFATE 5 MG: 50 INJECTION INTRAVENOUS at 11:08

## 2023-08-29 RX ADMIN — EPHEDRINE SULFATE 5 MG: 50 INJECTION INTRAVENOUS at 10:08

## 2023-08-29 RX ADMIN — PROPOFOL 30 MG: 10 INJECTION, EMULSION INTRAVENOUS at 08:08

## 2023-08-29 RX ADMIN — DEXMEDETOMIDINE 8 MCG: 100 INJECTION, SOLUTION, CONCENTRATE INTRAVENOUS at 11:08

## 2023-08-29 RX ADMIN — PHENYLEPHRINE HYDROCHLORIDE 100 MCG: 10 INJECTION INTRAVENOUS at 11:08

## 2023-08-29 RX ADMIN — MIDAZOLAM HYDROCHLORIDE 2 MG: 2 INJECTION, SOLUTION INTRAMUSCULAR; INTRAVENOUS at 07:08

## 2023-08-29 RX ADMIN — ACETAMINOPHEN 1000 MG: 10 INJECTION, SOLUTION INTRAVENOUS at 11:08

## 2023-08-29 RX ADMIN — ONDANSETRON 4 MG: 2 INJECTION INTRAMUSCULAR; INTRAVENOUS at 11:08

## 2023-08-29 RX ADMIN — CEFAZOLIN 2 G: 330 INJECTION, POWDER, FOR SOLUTION INTRAMUSCULAR; INTRAVENOUS at 07:08

## 2023-08-29 RX ADMIN — SUGAMMADEX 200 MG: 100 INJECTION, SOLUTION INTRAVENOUS at 11:08

## 2023-08-29 RX ADMIN — LAMOTRIGINE 300 MG: 100 TABLET, EXTENDED RELEASE ORAL at 09:08

## 2023-08-29 RX ADMIN — Medication 20 MG: at 10:08

## 2023-08-29 RX ADMIN — OXYCODONE HYDROCHLORIDE 10 MG: 10 TABLET ORAL at 01:08

## 2023-08-29 RX ADMIN — CEFAZOLIN 2 G: 330 INJECTION, POWDER, FOR SOLUTION INTRAMUSCULAR; INTRAVENOUS at 11:08

## 2023-08-29 RX ADMIN — HYDROMORPHONE HYDROCHLORIDE 0.2 MG: 1 INJECTION, SOLUTION INTRAMUSCULAR; INTRAVENOUS; SUBCUTANEOUS at 01:08

## 2023-08-29 RX ADMIN — PHENYLEPHRINE HYDROCHLORIDE 50 MCG: 10 INJECTION INTRAVENOUS at 09:08

## 2023-08-29 RX ADMIN — DEXMEDETOMIDINE 4 MCG: 100 INJECTION, SOLUTION, CONCENTRATE INTRAVENOUS at 11:08

## 2023-08-29 RX ADMIN — FENTANYL CITRATE 50 MCG: 50 INJECTION, SOLUTION INTRAMUSCULAR; INTRAVENOUS at 11:08

## 2023-08-29 RX ADMIN — CLINDAMYCIN IN 5 PERCENT DEXTROSE 900 MG: 18 INJECTION, SOLUTION INTRAVENOUS at 07:08

## 2023-08-29 RX ADMIN — DEXAMETHASONE SODIUM PHOSPHATE 4 MG: 4 INJECTION, SOLUTION INTRAMUSCULAR; INTRAVENOUS at 07:08

## 2023-08-29 RX ADMIN — PROPOFOL 350 MG: 10 INJECTION, EMULSION INTRAVENOUS at 07:08

## 2023-08-29 RX ADMIN — PROPOFOL 40 MG: 10 INJECTION, EMULSION INTRAVENOUS at 12:08

## 2023-08-29 RX ADMIN — CEFAZOLIN 2 G: 2 INJECTION, POWDER, FOR SOLUTION INTRAMUSCULAR; INTRAVENOUS at 09:08

## 2023-08-29 RX ADMIN — HYDROMORPHONE HYDROCHLORIDE 0.2 MG: 1 INJECTION, SOLUTION INTRAMUSCULAR; INTRAVENOUS; SUBCUTANEOUS at 02:08

## 2023-08-29 RX ADMIN — LIDOCAINE HYDROCHLORIDE 80 MG: 20 INJECTION INTRAVENOUS at 07:08

## 2023-08-29 RX ADMIN — DOCUSATE SODIUM 100 MG: 100 CAPSULE, LIQUID FILLED ORAL at 09:08

## 2023-08-29 RX ADMIN — MUPIROCIN: 20 OINTMENT TOPICAL at 09:08

## 2023-08-29 RX ADMIN — FENTANYL CITRATE 100 MCG: 50 INJECTION, SOLUTION INTRAMUSCULAR; INTRAVENOUS at 07:08

## 2023-08-29 RX ADMIN — Medication 10 MG: at 11:08

## 2023-08-29 RX ADMIN — METHOCARBAMOL 750 MG: 750 TABLET ORAL at 07:08

## 2023-08-29 RX ADMIN — PHENYLEPHRINE HYDROCHLORIDE 100 MCG: 10 INJECTION INTRAVENOUS at 10:08

## 2023-08-29 RX ADMIN — MIRTAZAPINE 30 MG: 15 TABLET, FILM COATED ORAL at 09:08

## 2023-08-29 RX ADMIN — SODIUM CHLORIDE: 0.9 INJECTION, SOLUTION INTRAVENOUS at 07:08

## 2023-08-29 RX ADMIN — PHENYLEPHRINE HYDROCHLORIDE 100 MCG: 10 INJECTION INTRAVENOUS at 09:08

## 2023-08-29 NOTE — HPI
Nilay is here for a revision PSF for L4 spondy.  He is transitioning and is on hormone treatment.  Has lost 80lbs. Feel the best he has felt physically and emotionally, in years.  Feel normal

## 2023-08-29 NOTE — H&P
Nilay is here for a revision PSF for L4 spondy.  He is transitioning and is on hormone treatment.  Has lost 80lbs. Feel the best he has felt physically and emotionally, in years.  Feel normal.    No outpatient medications have been marked as taking for the 7/20/23 encounter (Appointment) with Vicente Min MD.         Review of Symptoms: No fevers or neuro changess       Active Ambulatory Problems     Diagnosis Date Noted    Overactive bladder 04/05/2013    Incomplete bladder emptying 04/05/2013    Constipation - functional 05/13/2013    Overweight 08/13/2013    Family history of hearing loss 06/22/2015    Family history of hypothyroidism 06/22/2015    Dandruff 09/14/2015    Vasovagal syncope 08/13/2019    Family history of early death 08/13/2019    Chronic pain of both knees 08/13/2019    Patellofemoral pain syndrome of both knees 08/13/2019    Spondylolisthesis at L4-L5 level 10/04/2019    Spondylisthesis 07/14/2020    Severe episode of recurrent major depressive disorder, without psychotic features 09/02/2020    Gastroesophageal reflux disease without esophagitis 09/02/2020    Pain 02/02/2021    Seasonal allergic rhinitis 02/20/2021    Decreased ROM of lumbar spine 05/10/2022    Decreased strength of trunk and back 05/10/2022    Impaired functional mobility, balance, gait, and endurance 05/10/2022    Bilateral hand pain 05/16/2023    Decreased  strength 05/16/2023           Resolved Ambulatory Problems     Diagnosis Date Noted    Spondylolysis of lumbar region 10/04/2019    Chronic low back pain 10/18/2019    Decreased strength of trunk and back 10/18/2019    Encounter for medical screening examination 09/02/2020    Depression with suicidal ideation 09/19/2020    Depression with suicidal ideation 02/19/2021    Decreased strength of trunk and back 12/09/2021    Weakness of both lower extremities 05/10/2022           Past Medical History:   Diagnosis Date    Depression      Scoliosis      Spondylolysis       Urinary tract infection           Physical Exam     Patient alert and oriented  Obese  All extremities pink and warm with good cap refill and no edema.   Gait normal.    Motor exam  And dtr  lower extremities intact  Back shows full rom.  Rotation and deformity Obese, no real deformity  Xrays today show likely nonunion with loosening of L4 screws.  No migration of cage or loss of correction.    Also seen on recent abdominal CT, with screw migration.      Nonunion L4 spondy     Plan   Plan for revision of L4-5 fusion posteriorly with Infuse.  With the cage appearing stable and no neuro symptoms, will not decompress or revise the cage.  Nilay shows good understanding of risks and indications.

## 2023-08-29 NOTE — ANESTHESIA PROCEDURE NOTES
Intubation    Date/Time: 8/29/2023 7:23 AM    Performed by: Chico Ross CRNA  Authorized by: Marizol Conrad MD    Intubation:     Induction:  Intravenous    Intubated:  Postinduction    Mask Ventilation:  Easy mask    Attempts:  1    Attempted By:  Student    Method of Intubation:  Direct    Blade:  King 2    Laryngeal View Grade: Grade I - full view of cords      Difficult Airway Encountered?: No      Complications:  None    Airway Device:  Oral endotracheal tube    Airway Device Size:  7.0    Style/Cuff Inflation:  Cuffed (inflated to minimal occlusive pressure)    Tube secured:  22    Secured at:  The lips    Placement Verified By:  Capnometry    Complicating Factors:  None    Findings Post-Intubation:  BS equal bilateral

## 2023-08-29 NOTE — ASSESSMENT & PLAN NOTE
Nilay KEHINDE Ramos is a 23 y.o. female s/p L3-5 PSF revision for failed L4-5 TLIF on 8/29/23.    - Antibiotics: ancef  - Weight bearing status: WBAT with LSO. Can remove LSO when lying in bed. Spinal precautions.   - Labs: Hb 13.7  - DVT Prophylaxis: Heparin, SCDs at all times while in bed  - Lines/Drains: PIV/JENELLE  - Pain control: multimodal  - PT/OT     Dispo: continue to monitor drain and f/u PT/OT

## 2023-08-29 NOTE — OP NOTE
[facility] - Surgery  General Surgery  Operative Note    SUMMARY     Date of Procedure: 8/29/2023     Procedure: Procedure(s) (LRB):  FUSION, SPINE, WITH INSTRUMENTATION- Depuy 5.5 revision L3-L5  (N/A)       Surgeon(s) and Role:     * Vicente Min MD - Primary     * Tien Coy MD - Resident - Assisting        Pre-Operative Diagnosis: Spondylolisthesis at L4-L5 level [M43.16]    Post-Operative Diagnosis: Post-Op Diagnosis Codes:     * Spondylolisthesis at L4-L5 level [M43.16]    Anesthesia: General    Technical Procedures Used:  Posterior spinal fusion L3-L5, instrumentation L3-L5, exploration of fusion L4-5    Description of the Findings of the Procedure:  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 Item Serial No.  Lot No. LRB No. Used Action   KIT BONE GRFT BMP SM - [id number]  KIT BONE GRFT BMP SM  [employer] [id number] N/A 1 Implanted   Hero Crushed Cancellous Chips 1-4mm, 15cc   [id number] Spinal Elements  N/A 1 Implanted   SCREW BONE SPINAL [id number] - [id number]  SCREW BONE SPINAL [id number]  [employer]  N/A 2 Implanted   SCREW BONE SPINAL 5.5 7 X 50MM - [id number]  SCREW BONE SPINAL 5.5 7 X 50MM  [employer]  N/A 4 Implanted and Explanted   SCREW BONE SPINAL 5.5 6 X 50MM - [id number]  SCREW BONE SPINAL 5.5 6 X 50MM  [employer]  N/A 1 Implanted and Explanted   SCREW INNER SINGLE SET TITANIU - [id number]  SCREW INNER SINGLE SET TITANIU  [employer]  N/A 6 Implanted   SCREW BONE SPINAL 5.5 6 X 45MM - [id number]  SCREW BONE SPINAL 5.5 6 X 45MM  [employer]  N/A 1 Implanted   EDGAR SPINAL EXPEDIUM 65MM - [id number]  EDGAR SPINAL EXPEDIUM 65MM  [employer]  N/A 2 Implanted   Hero Crushed Cancellous Chips 1-4mm, 15cc   [id number]   N/A 1 Implanted       Specimens:   Specimen (24h ago, onward)      None                     Condition: Good    Disposition: PACU - hemodynamically stable.    Attestation: I was present and scrubbed for the entire procedure.    Back comes to the operating room for a nonunion of a posterior spinal fusion.  He had a previous  posterior fusion done from L4-L5 with an anterior interbody..  The L 4 screws are healing.  Fortunately there has been no movement of the anterior interbody fusion.   We incision was made in the midline.  After dissecting through fascia we split the muscle dissect directly over the L4-5 screws.  Exploring the fusion mass there was none.  This was a nonunion posteriorly.  We removed the L4 and 5 screws and identified the lateral elements including the pars and transverse processes.  It was really a very small transverse process bilaterally L4.  Also there was a large defect from the previous screws.  We did feel like it was important to go to L3 for adequate fixation.  We then extended our incision and our exposure to L3.  We stayed extra-articular, exposing the facet and working our way medially toward the lateral facet.  With the old plan implants were we upsized our screws both in length and width.  We then placed our screws at L3.  Screws all tested within normal range.  Other no monitoring done was normal throughout as well.  We decorticated the transverse processes and pars and lateral facets.  We then placed a medium infuse along the gutter from L3-L5.  This was followed by 30 cc of graft.  We only did this after irrigating with normal saline and vancomycin.  The graft was packed into the gutters laterally.  This was a posterolateral fusion.  Paresis we did use the torque wrench with the screws.  Final fluoro images were obtained which showed good position of implants.  We then began closure.  Closure was with a deep fascial layer.  We then injected with a combination of Exparel and Marcaine.  40 cc of a 50% mixture was used.  This was injected in the muscle and  subcu.  After a watertight fascial layer we placed a subcu drain into the subcu and subcuticular skin layer.  Dermabond Prineo was placed.  Sterile dressing was placed.  Back was taken the operating room in stable condition.

## 2023-08-29 NOTE — CARE UPDATE
Comfortable  Alert  Motor intact all ext  Abdomen soft and not tender  Continue post op plans  Fluids and PRN morphine ordered.

## 2023-08-29 NOTE — HOSPITAL COURSE
On 8/29/23, the patient arrived to the [facility] for proper pre-operative management.  Upon completion of pre-operative preparation, the patient was taken back to the operative theatre. L3-5 PSF was performed without complication and the patient was transported to the post anesthesia care unit in stable condition.  After appropriate recovery from the anaesthetic agents used during the surgery, the patient was then transported to the hospital inpatient floor.  The interim of the hospital stay from arrival on the floor up to discharge has been uncomplicated. The patient has tolerated regular diet.  The patient's pain has been controlled using a multimodal approach. Currently, the patient's pain is well controlled on an oral regimen.  The patient has been voiding without difficulty.  The patient began participation in physical therapy after surgery and has progressed throughout the entire hospital stay.  Currently, the patient's progress is sufficient to allow the them to be discharged to home safely.  The patient agrees with this assessment and desires a discharge today.

## 2023-08-29 NOTE — TRANSFER OF CARE
"Anesthesia Transfer of Care Note    Patient: Nilay Ramos    Procedure(s) Performed: Procedure(s) (LRB):  FUSION, SPINE, WITH INSTRUMENTATION- Depuy 5.5 revision L3-L5  (N/A)    Patient location: PACU    Anesthesia Type: general    Transport from OR: Transported from OR on 6-10 L/min O2 by face mask with adequate spontaneous ventilation    Post pain: adequate analgesia    Post assessment: no apparent anesthetic complications and tolerated procedure well    Post vital signs: stable    Level of consciousness: sedated    Nausea/Vomiting: no nausea/vomiting    Complications: none    Transfer of care protocol was followed      Last vitals:   Visit Vitals  /77   Pulse 116   Temp 36.8 °C (98.3 °F) (Oral)   Resp 18   Ht 5' 6" (1.676 m)   Wt 106.1 kg (234 lb)   LMP 09/23/2020   SpO2 97%   Breastfeeding No   BMI 37.77 kg/m²     "

## 2023-08-29 NOTE — PLAN OF CARE
Chart reviewed. Preop nursing care completed per orders. Safe surgery checklist complete. Family at bedside and to take belongings. Waiting for full H&P, admission order, anesthesia consent, and blood consent prior to surgery. Call bell within reach. Instructed pt to call for assistance.

## 2023-08-29 NOTE — ANESTHESIA PROCEDURE NOTES
Arterial    Diagnosis: Spomdylolisthesis L4-L5    Patient location during procedure: done in OR    Staffing  Authorizing Provider: Marizol Conrad MD  Performing Provider: Chico Ross CRNA    Staffing  Other anesthesia staff: Michell Dinh  Performed by: Chico Ross CRNA  Authorized by: Marizol Conrad MD    Anesthesiologist was present at the time of the procedure.    Preanesthetic Checklist  Completed: patient identified, IV checked, site marked, risks and benefits discussed, surgical consent, monitors and equipment checked, pre-op evaluation, timeout performed and anesthesia consent givenArterial  Skin Prep: chlorhexidine gluconate  Local Infiltration: none  Orientation: right  Location: radial    Catheter placement by Anatomical landmarks. Heme positive aspiration all ports. Insertion Attempts: 1  Assessment  Dressing: secured with tape and tegaderm  Patient: Tolerated well

## 2023-08-29 NOTE — ANESTHESIA PREPROCEDURE EVALUATION
08/29/2023  Nilay BUSBY Workman is a 23 y.o., female.    Past Medical History:   Diagnosis Date    Depression     Scoliosis     Spondylolysis     Urinary tract infection        Past Surgical History:   Procedure Laterality Date    BACK SURGERY      HYSTERECTOMY      TRANSFORAMINAL LUMBAR INTERBODY FUSION  07/14/2020    Procedure: FUSION, SPINE, LUMBAR, TLIF;  Surgeon: Vicente Min MD;  Location: [facility] OR [address] [place] [address];  Service: Orthopedics;;  L4-5    WISDOM TOOTH EXTRACTION             Pre-op Assessment          Review of Systems  Anesthesia Hx:  No problems with previous Anesthesia  History of prior surgery of interest to airway management or planning: Denies Family Hx of Anesthesia complications.   Denies Personal Hx of Anesthesia complications.   Cardiovascular:  Cardiovascular Normal     Pulmonary:  Pulmonary Normal  Denies Asthma.  Denies Recent URI.    Hepatic/GI:   IBS- much better controlled recently with dietary changes   Neurological:  Neurology Normal    Psych:   Psychiatric History depression          Physical Exam  General: Alert, Oriented and Well nourished    Airway:  Mallampati: II   Mouth Opening: Normal  TM Distance: Normal  Neck ROM: Normal ROM    Chest/Lungs:  Normal Respiratory Rate    Heart:  Rate: Normal  Rhythm: Regular Rhythm        Anesthesia Plan  Type of Anesthesia, risks & benefits discussed:    Anesthesia Type: Gen ETT  Intra-op Monitoring Plan: Standard ASA Monitors and Art Line  Post Op Pain Control Plan: multimodal analgesia and IV/PO Opioids PRN  Induction:  IV  Informed Consent: Informed consent signed with the Patient and all parties understand the risks and agree with anesthesia plan.  All questions answered.   ASA Score: 3  Day of Surgery Review of History & Physical: H&P Update referred to the surgeon/provider.    Ready For Surgery From Anesthesia Perspective.      .

## 2023-08-29 NOTE — NURSING TRANSFER
Nursing Transfer Note      8/29/2023   3:30 PM    Nurse giving handoff:althea rn   Nurse receiving handoff:    Reason patient is being transferred: post procedure    Transfer To: 506    Transfer via stretcher    Transfer with n/a    Transported by transport    Leni special needs or follow-up needed: n/a    Patient belongings transferred with patient: No    Chart send with patient: Yes    Notified: mother and sig other    Patient reassessed at: 8/29/23 @

## 2023-08-30 LAB
BASOPHILS # BLD AUTO: 0.03 K/UL (ref 0–0.2)
BASOPHILS NFR BLD: 0.3 % (ref 0–1.9)
DIFFERENTIAL METHOD: ABNORMAL
EOSINOPHIL # BLD AUTO: 0 K/UL (ref 0–0.5)
EOSINOPHIL NFR BLD: 0.4 % (ref 0–8)
ERYTHROCYTE [DISTWIDTH] IN BLOOD BY AUTOMATED COUNT: 12.2 % (ref 11.5–14.5)
HCT VFR BLD AUTO: 40.9 % (ref 37–48.5)
HGB BLD-MCNC: 13.7 G/DL (ref 12–16)
IMM GRANULOCYTES # BLD AUTO: 0.05 K/UL (ref 0–0.04)
IMM GRANULOCYTES NFR BLD AUTO: 0.4 % (ref 0–0.5)
LYMPHOCYTES # BLD AUTO: 1.7 K/UL (ref 1–4.8)
LYMPHOCYTES NFR BLD: 14.9 % (ref 18–48)
MCH RBC QN AUTO: 31.2 PG (ref 27–31)
MCHC RBC AUTO-ENTMCNC: 33.5 G/DL (ref 32–36)
MCV RBC AUTO: 93 FL (ref 82–98)
MONOCYTES # BLD AUTO: 1.4 K/UL (ref 0.3–1)
MONOCYTES NFR BLD: 12.7 % (ref 4–15)
NEUTROPHILS # BLD AUTO: 8.1 K/UL (ref 1.8–7.7)
NEUTROPHILS NFR BLD: 71.3 % (ref 38–73)
NRBC BLD-RTO: 0 /100 WBC
PLATELET # BLD AUTO: 217 K/UL (ref 150–450)
PMV BLD AUTO: 9.7 FL (ref 9.2–12.9)
RBC # BLD AUTO: 4.39 M/UL (ref 4–5.4)
WBC # BLD AUTO: 11.31 K/UL (ref 3.9–12.7)

## 2023-08-30 PROCEDURE — 97535 SELF CARE MNGMENT TRAINING: CPT

## 2023-08-30 PROCEDURE — 85025 COMPLETE CBC W/AUTO DIFF WBC: CPT | Performed by: STUDENT IN AN ORGANIZED HEALTH CARE EDUCATION/TRAINING PROGRAM

## 2023-08-30 PROCEDURE — 97116 GAIT TRAINING THERAPY: CPT

## 2023-08-30 PROCEDURE — 25000003 PHARM REV CODE 250: Performed by: STUDENT IN AN ORGANIZED HEALTH CARE EDUCATION/TRAINING PROGRAM

## 2023-08-30 PROCEDURE — 94761 N-INVAS EAR/PLS OXIMETRY MLT: CPT

## 2023-08-30 PROCEDURE — 36415 COLL VENOUS BLD VENIPUNCTURE: CPT | Performed by: STUDENT IN AN ORGANIZED HEALTH CARE EDUCATION/TRAINING PROGRAM

## 2023-08-30 PROCEDURE — 63600175 PHARM REV CODE 636 W HCPCS: Performed by: ORTHOPAEDIC SURGERY

## 2023-08-30 PROCEDURE — 97161 PT EVAL LOW COMPLEX 20 MIN: CPT

## 2023-08-30 PROCEDURE — 97165 OT EVAL LOW COMPLEX 30 MIN: CPT

## 2023-08-30 PROCEDURE — 21400001 HC TELEMETRY ROOM

## 2023-08-30 PROCEDURE — 63600175 PHARM REV CODE 636 W HCPCS: Performed by: STUDENT IN AN ORGANIZED HEALTH CARE EDUCATION/TRAINING PROGRAM

## 2023-08-30 RX ORDER — GABAPENTIN 300 MG/1
300 CAPSULE ORAL 3 TIMES DAILY
Status: DISCONTINUED | OUTPATIENT
Start: 2023-08-30 | End: 2023-08-31 | Stop reason: HOSPADM

## 2023-08-30 RX ORDER — OXYCODONE HYDROCHLORIDE 5 MG/1
5 TABLET ORAL EVERY 4 HOURS PRN
Status: DISCONTINUED | OUTPATIENT
Start: 2023-08-30 | End: 2023-08-31 | Stop reason: HOSPADM

## 2023-08-30 RX ORDER — OXYCODONE HCL 10 MG/1
10 TABLET, FILM COATED, EXTENDED RELEASE ORAL EVERY 12 HOURS
Status: DISCONTINUED | OUTPATIENT
Start: 2023-08-30 | End: 2023-08-31 | Stop reason: HOSPADM

## 2023-08-30 RX ADMIN — FAMOTIDINE 40 MG: 20 TABLET, FILM COATED ORAL at 08:08

## 2023-08-30 RX ADMIN — ACETAMINOPHEN 1000 MG: 500 TABLET ORAL at 02:08

## 2023-08-30 RX ADMIN — GABAPENTIN 300 MG: 300 CAPSULE ORAL at 02:08

## 2023-08-30 RX ADMIN — ACETAMINOPHEN 1000 MG: 500 TABLET ORAL at 09:08

## 2023-08-30 RX ADMIN — LAMOTRIGINE 300 MG: 100 TABLET, EXTENDED RELEASE ORAL at 08:08

## 2023-08-30 RX ADMIN — MUPIROCIN: 20 OINTMENT TOPICAL at 08:08

## 2023-08-30 RX ADMIN — DOCUSATE SODIUM 100 MG: 100 CAPSULE, LIQUID FILLED ORAL at 08:08

## 2023-08-30 RX ADMIN — METHOCARBAMOL 750 MG: 750 TABLET ORAL at 08:08

## 2023-08-30 RX ADMIN — HEPARIN SODIUM 5000 UNITS: 5000 INJECTION INTRAVENOUS; SUBCUTANEOUS at 05:08

## 2023-08-30 RX ADMIN — POLYETHYLENE GLYCOL 3350 17 G: 17 POWDER, FOR SOLUTION ORAL at 08:08

## 2023-08-30 RX ADMIN — MORPHINE SULFATE 4 MG: 4 INJECTION INTRAVENOUS at 10:08

## 2023-08-30 RX ADMIN — GABAPENTIN 300 MG: 300 CAPSULE ORAL at 08:08

## 2023-08-30 RX ADMIN — OXYCODONE HYDROCHLORIDE 10 MG: 10 TABLET ORAL at 12:08

## 2023-08-30 RX ADMIN — HEPARIN SODIUM 5000 UNITS: 5000 INJECTION INTRAVENOUS; SUBCUTANEOUS at 09:08

## 2023-08-30 RX ADMIN — HEPARIN SODIUM 5000 UNITS: 5000 INJECTION INTRAVENOUS; SUBCUTANEOUS at 02:08

## 2023-08-30 RX ADMIN — CEFAZOLIN 2 G: 2 INJECTION, POWDER, FOR SOLUTION INTRAMUSCULAR; INTRAVENOUS at 10:08

## 2023-08-30 RX ADMIN — MIRTAZAPINE 30 MG: 15 TABLET, FILM COATED ORAL at 08:08

## 2023-08-30 RX ADMIN — ACETAMINOPHEN 1000 MG: 500 TABLET ORAL at 04:08

## 2023-08-30 RX ADMIN — METHOCARBAMOL 750 MG: 750 TABLET ORAL at 02:08

## 2023-08-30 RX ADMIN — OXYCODONE HYDROCHLORIDE 10 MG: 10 TABLET ORAL at 08:08

## 2023-08-30 RX ADMIN — CEFAZOLIN 2 G: 2 INJECTION, POWDER, FOR SOLUTION INTRAMUSCULAR; INTRAVENOUS at 04:08

## 2023-08-30 RX ADMIN — OXYCODONE HYDROCHLORIDE 5 MG: 5 TABLET ORAL at 02:08

## 2023-08-30 RX ADMIN — OXYCODONE HYDROCHLORIDE 10 MG: 10 TABLET, FILM COATED, EXTENDED RELEASE ORAL at 10:08

## 2023-08-30 NOTE — PLAN OF CARE
PT eval completed- see note for details, goals and POC established.     Problem: Physical Therapy  Goal: Physical Therapy Goal  Description: Goals to be met by: 23     Patient will increase functional independence with mobility by performin. Sit to stand transfer with Modified [place]  2. Bed to chair transfer with Supervision using No Assistive Device  3. Gait  x 200 feet with Supervision using No Assistive Device.     Outcome: Ongoing, Progressing   2023

## 2023-08-30 NOTE — NURSING
Nurses Note -- 4 Eyes      8/30/2023   11:39 AM      Skin assessed during: Admit      [x] No Altered Skin Integrity Present    []Prevention Measures Documented      [] Yes- Altered Skin Integrity Present or Discovered   [] LDA Added if Not in Epic (Describe Wound)   [] New Altered Skin Integrity was Present on Admit and Documented in LDA   [] Wound Image Taken    Wound Care Consulted? No    Attending Nurse:  Tahmina Valencia RN    Second RN/Staff Member:   Tahmina Calixto RN

## 2023-08-30 NOTE — PROGRESS NOTES
[facility] - Surgery  Orthopedics  Progress Note    Patient Name: Nilay Ramos  MRN: [id number]  Admission Date: 8/29/2023  Hospital Length of Stay: 1 days  Attending Provider: Vicente Min MD  Primary Care Provider: Iman aGrza FNP  Follow-up For: Procedure(s) (LRB):  FUSION, SPINE, WITH INSTRUMENTATION- Depuy 5.5 revision L3-L5  (N/A)    Post-Operative Day: 1 Day Post-Op  Subjective:     Principal Problem:Spondylolisthesis at L4-L5 level    Principal Orthopedic Problem: same    Interval History: Patient examined at the bedside. NAEON. Pain controlled. Tolerating PO w/out N/V and voiding appropriately. Hasn't worked w PT yet, but has gotten up to void several times overnight. Drain with 110ccs of SS drainage since surgery (35 overnight). Hb this AM 13.7.    Review of patient's allergies indicates:   Allergen Reactions    Lactose Other (See Comments)       Current Facility-Administered Medications   Medication    0.9%  NaCl infusion    acetaminophen tablet 1,000 mg    bisacodyL suppository 10 mg    ceFAZolin 2 g in dextrose 5 % in water (D5W) 50 mL IVPB (MB+)    dextrose 5 % and 0.9 % NaCl with KCl 20 mEq infusion    docusate sodium capsule 100 mg    famotidine tablet 40 mg    heparin (porcine) injection 5,000 Units    hyoscyamine 0.125 mg/mL oral solution (Adult 12 y.o. and older) 0.125 mg    lamotrigine XR XR tablet 300 mg    melatonin tablet 6 mg    methocarbamoL tablet 750 mg    mirtazapine tablet 30 mg    morphine injection 4 mg    mupirocin 2 % ointment    ondansetron injection 4 mg    oxyCODONE immediate release tablet 5 mg    oxyCODONE immediate release tablet Tab 10 mg    polyethylene glycol packet 17 g    sodium chloride 0.9% flush 10 mL     Objective:     Vital Signs (Most Recent):  Temp: 98.7 °F (37.1 °C) (08/30/23 0446)  Pulse: 97 (08/30/23 0446)  Resp: 18 (08/30/23 0446)  BP: 107/61 (08/30/23 0446)  SpO2: 100 % (08/30/23 0446) Vital Signs (24h Range):  Temp:  [98.1 °F  "(36.7 °C)-98.7 °F (37.1 °C)] 98.7 °F (37.1 °C)  Pulse:  [] 97  Resp:  [10-32] 18  SpO2:  [92 %-100 %] 100 %  BP: (103-136)/(56-78) 107/61     Weight: 106.1 kg (234 lb)  Height: 5' 5.98" (167.6 cm)  Body mass index is 37.79 kg/m².      Intake/Output Summary (Last 24 hours) at 8/30/2023 0612  Last data filed at 8/30/2023 0521  Gross per 24 hour   Intake 2820.07 ml   Output 400 ml   Net 2420.07 ml        Ortho/SPM Exam  NAD, A/O x 3.  Dressings c/d/I  Drain with SS drainage holding suction  No focal motor or sensory deficits noted.     Significant Labs: CBC:   Recent Labs   Lab 08/29/23  1259 08/30/23  0408   WBC 11.70 11.31   HGB 14.5 13.7   HCT 43.9 40.9    217       Significant Imaging: I have reviewed all pertinent imaging results/findings.    Assessment/Plan:     * Spondylolisthesis at L4-L5 level  Nilay Ramos is a 23 y.o. female s/p L3-5 PSF revision for failed L4-5 TLIF on 8/29/23.    - Antibiotics: ancef  - Weight bearing status: WBAT with LSO. Can remove LSO when lying in bed. Spinal precautions.   - Labs: Hb 13.7  - DVT Prophylaxis: Heparin, SCDs at all times while in bed  - Lines/Drains: PIV/JENELLE  - Pain control: multimodal  - PT/OT     Dispo: continue to monitor drain and f/u PT/OT          Tien Coy MD  Orthopedics  [facility] - Surgery  "

## 2023-08-30 NOTE — PLAN OF CARE
Patient resting in bed, awake and alert. All needs met. Call light in reach. POC ongoing.     Problem: Adult Inpatient Plan of Care  Goal: Plan of Care Review  Outcome: Ongoing, Progressing  Goal: Patient-Specific Goal (Individualized)  Outcome: Ongoing, Progressing  Goal: Absence of Hospital-Acquired Illness or Injury  Outcome: Ongoing, Progressing  Goal: Optimal Comfort and Wellbeing  Outcome: Ongoing, Progressing  Goal: Readiness for Transition of Care  Outcome: Ongoing, Progressing     Problem: Infection  Goal: Absence of Infection Signs and Symptoms  Outcome: Ongoing, Progressing

## 2023-08-30 NOTE — SUBJECTIVE & OBJECTIVE
"Principal Problem:Spondylolisthesis at L4-L5 level    Principal Orthopedic Problem: same    Interval History: Patient examined at the bedside. NAEON. Pain controlled. Tolerating PO w/out N/V and voiding appropriately. Hasn't worked w PT yet, but has gotten up to void several times overnight. Drain with 110ccs of SS drainage since surgery (35 overnight). Hb this AM 13.7.    Review of patient's allergies indicates:   Allergen Reactions    Lactose Other (See Comments)       Current Facility-Administered Medications   Medication    0.9%  NaCl infusion    acetaminophen tablet 1,000 mg    bisacodyL suppository 10 mg    ceFAZolin 2 g in dextrose 5 % in water (D5W) 50 mL IVPB (MB+)    dextrose 5 % and 0.9 % NaCl with KCl 20 mEq infusion    docusate sodium capsule 100 mg    famotidine tablet 40 mg    heparin (porcine) injection 5,000 Units    hyoscyamine 0.125 mg/mL oral solution (Adult 12 y.o. and older) 0.125 mg    lamotrigine XR XR tablet 300 mg    melatonin tablet 6 mg    methocarbamoL tablet 750 mg    mirtazapine tablet 30 mg    morphine injection 4 mg    mupirocin 2 % ointment    ondansetron injection 4 mg    oxyCODONE immediate release tablet 5 mg    oxyCODONE immediate release tablet Tab 10 mg    polyethylene glycol packet 17 g    sodium chloride 0.9% flush 10 mL     Objective:     Vital Signs (Most Recent):  Temp: 98.7 °F (37.1 °C) (08/30/23 0446)  Pulse: 97 (08/30/23 0446)  Resp: 18 (08/30/23 0446)  BP: 107/61 (08/30/23 0446)  SpO2: 100 % (08/30/23 0446) Vital Signs (24h Range):  Temp:  [98.1 °F (36.7 °C)-98.7 °F (37.1 °C)] 98.7 °F (37.1 °C)  Pulse:  [] 97  Resp:  [10-32] 18  SpO2:  [92 %-100 %] 100 %  BP: (103-136)/(56-78) 107/61     Weight: 106.1 kg (234 lb)  Height: 5' 5.98" (167.6 cm)  Body mass index is 37.79 kg/m².      Intake/Output Summary (Last 24 hours) at 8/30/2023 0612  Last data filed at 8/30/2023 0521  Gross per 24 hour   Intake 2820.07 ml   Output 400 ml   Net 2420.07 ml        Ortho/SPM " Exam  NAD, A/O x 3.  Dressings c/d/I  Drain with SS drainage holding suction  No focal motor or sensory deficits noted.     Significant Labs: CBC:   Recent Labs   Lab 08/29/23  1259 08/30/23  0408   WBC 11.70 11.31   HGB 14.5 13.7   HCT 43.9 40.9    217       Significant Imaging: I have reviewed all pertinent imaging results/findings.

## 2023-08-30 NOTE — PT/OT/SLP EVAL
"Physical Therapy Co-Evaluation    Patient Name:  Nilay Ramos   MRN:  [id number]    Recommendations:     Discharge Recommendations: other (see comments)   Discharge Equipment Recommendations: none   Barriers to discharge: None    Assessment:     Nilay Ramos is a 23 y.o. adult admitted with a medical diagnosis of Spondylolisthesis at L4-L5 level.  He presents with the following impairments/functional limitations: weakness, impaired endurance, decreased ROM, orthopedic precautions, gait instability, impaired balance, impaired functional mobility, decreased coordination   Pt receptive and tolerated PT co-eval with OT well. Pt educated on spinal precautions and log roll technique prior to start of treatment with pt verbalizing understanding. Pt educated on wearing LSO brace anytime OOB with pt verbalizing understanding. Pt amb ~380' with CGA and no AD, demonstrated mild unsteadiness amb with narrow base of support but no LOB in gait trial. Pt would benefit from acute skilled PT services to improve functional mobility and activity tolerance.  .    Rehab Prognosis: Good; patient would benefit from acute skilled PT services to address these deficits and reach maximum level of function.    Recent Surgery: Procedure(s) (LRB):  FUSION, SPINE, WITH INSTRUMENTATION- Depuy 5.5 revision L3-L5  (N/A) 1 Day Post-Op    Plan:     During this hospitalization, patient to be seen 3 x/week to address the identified rehab impairments via gait training, therapeutic activities, therapeutic exercises, neuromuscular re-education and progress toward the following goals:    Plan of Care Expires:  09/29/23    Subjective     Chief Complaint: lower back pain  Patient/Family Comments/goals: "I remember some of this stuff from my past surgery"  Pain/Comfort:  Pain Rating 1: 7/10  Location - Side 1: Bilateral  Location - Orientation 1: lower  Location 1: back  Pain Addressed 1: Pre-medicate for activity, Reposition, Distraction, Cessation of " Activity  Pain Rating Post-Intervention 1:  (pt did not rate)    Patients cultural, spiritual, Hindu conflicts given the current situation: no    Patient History:     Living Environment: Pt lives with significant other in 1st floor apt with 1 [address]ERRY. Bathroom: walk-in shower   Prior Level of Function: IND with amb and ADLs  DME owned: none  Caregiver Assistance: significant other    Additional roles/responsibilities/hobbies:   Driving: no  Occpuation: on disability    Objective:     Communicated with RN prior to session.  Patient found ambulatory in room/khan with peripheral IV, hemovac  upon PT entry to room.    General Precautions: Standard, fall  Orthopedic Precautions:spinal precautions, LLE weight bearing as tolerated, RLE weight bearing as tolerated   Braces: LSO  Respiratory Status: Room air    Exams:  Sensation:    -       Intact  RLE ROM: WFL  RLE Strength: WFL  LLE ROM: WFL  LLE Strength: WFL    Functional Mobility:    Transfers:   Sit <> Stand Transfer: stand by assistance from bedside chair without AD  Bed <> Chair: stand by assistance without AD    Balance:   Sitting balance: GOOD-: Incosistently Maintains balance through MODERATE excursions of active trunk movement,     Standing balance:   FAIR+: Takes MINIMAL challenges from all directions  FAIR: Needs CONTACT GUARD during gait                 Gait:  Distance: 380'   Assistive Device: no AD  Assistance Level: contact guard assistance  Gait Assessment: Pt amb with mild generalized unsteadiness demonstrating narrow base of support almost scissoring gait pattern. Pt amb with increased caron but no LOB.    AM-PAC 6 CLICK MOBILITY  Total Score:22     OT present for coeval due to pt's multiple medical comorbidities and functional/cognition deficits requiring two skilled therapists to appropriately progress pt's musculoskeletal strength, neuromuscular control, and endurance while taking into consideration medical acuity and pt safety.    Treatment &  Education:  Pt educated on spinal precautions and log roll technique prior to start of treatment with pt verbalizing understanding. Pt educated on wearing LSO brace anytime OOB with pt verbalizing understanding. Pt demonstrated mild generalized unsteadiness during gait trial but no LOB, needed CGA to amb 380' without AD this session.  Pt educated on safety with mobility and using call button for assistance from nursing staff with OOB mobility.  Pt educated on tips to reduce fall risk.  Pt educated on amb 2-3x per day and increased movement during hospital stay.  All questions answered within the scope of PT.  White board updated accordingly.      Patient left up in chair with all lines intact and call button in reach.    GOALS:   Multidisciplinary Problems       Physical Therapy Goals          Problem: Physical Therapy    Goal Priority Disciplines Outcome Goal Variances Interventions   Physical Therapy Goal     PT, PT/OT Ongoing, Progressing     Description: Goals to be met by: 23     Patient will increase functional independence with mobility by performin. Sit to stand transfer with Modified [place]  2. Bed to chair transfer with Supervision using No Assistive Device  3. Gait  x 200 feet with Supervision using No Assistive Device.                          History:     Past Medical History:   Diagnosis Date    Depression     Scoliosis     Spondylolysis     Urinary tract infection        Past Surgical History:   Procedure Laterality Date    BACK SURGERY      FUSION OF SPINE WITH INSTRUMENTATION N/A 2023    Procedure: FUSION, SPINE, WITH INSTRUMENTATION- Depuy 5.5 revision L3-L5 ;  Surgeon: Vicente Min MD;  Location: [address];  Service: Orthopedics;  Laterality: N/A;    HYSTERECTOMY      TRANSFORAMINAL LUMBAR INTERBODY FUSION  2020    Procedure: FUSION, SPINE, LUMBAR, TLIF;  Surgeon: Vicente Min MD;  Location: [facility] OR [address] [place] [address];  Service: Orthopedics;;  L4-5    WISDOM  TOOTH EXTRACTION         Time Tracking:     PT Received On: 08/30/23  PT Start Time: 0835     PT Stop Time: 0853  PT Total Time (min): 18 min     Billable Minutes: Evaluation 10 and Gait Training 8      08/30/2023

## 2023-08-30 NOTE — ANESTHESIA POSTPROCEDURE EVALUATION
Anesthesia Post Evaluation    Patient: Nilay Ramos    Procedure(s) Performed: Procedure(s) (LRB):  FUSION, SPINE, WITH INSTRUMENTATION- Depuy 5.5 revision L3-L5  (N/A)    Final Anesthesia Type: general      Patient location during evaluation: PACU  Patient participation: Yes- Able to Participate  Level of consciousness: awake and alert  Post-procedure vital signs: reviewed and stable  Pain management: adequate  Airway patency: patent    PONV status at discharge: No PONV  Anesthetic complications: no      Cardiovascular status: blood pressure returned to baseline  Respiratory status: unassisted, room air and spontaneous ventilation  Hydration status: euvolemic  Follow-up not needed.          See PACU vitals                                    Event Time   Out of Recovery 13:00:00         Pain/Demond Score: Pain Rating Prior to Med Admin: 7 (8/30/2023  8:25 AM)  Pain Rating Post Med Admin: 5 (8/30/2023  5:50 AM)  Demond Score: 10 (8/29/2023  1:00 PM)

## 2023-08-30 NOTE — PLAN OF CARE
Problem: Occupational Therapy  Goal: Occupational Therapy Goal  Description: Goals to be met by: 9/13/23     Patient will increase functional independence with ADLs by performing:    UE Dressing with Modified [place].  LE Dressing with Modified [place].  Grooming while standing at sink with Modified [place].  Toileting from toilet/bedside commode with Modified [place] for hygiene and clothing management.   Toilet transfer to toilet/bedside commode with Modified [place].  Pt will (I) maintain 3/3 spinal precautions during performance of functional activities and functional mobility/transfers.    Outcome: Ongoing, Progressing   OT eval complete with goals established.

## 2023-08-30 NOTE — PT/OT/SLP EVAL
Occupational Therapy   Co-Evaluation  Co-treatment performed due to patient's multiple deficits requiring two skilled therapists to appropriately and safely assess patient's strength and endurance while facilitating functional tasks in addition to accommodating for patient's activity tolerance.       Name: Nilay Ramos  MRN: [id number]  Admitting Diagnosis: Spondylolisthesis at L4-L5 level  Recent Surgery: Procedure(s) (LRB):  FUSION, SPINE, WITH INSTRUMENTATION- Depuy 5.5 revision L3-L5  (N/A) 1 Day Post-Op    Recommendations:     Discharge Recommendations: other (see comments)  Discharge Equipment Recommendations:  shower chair  Barriers to discharge:  None    Assessment:     Nilay Ramos is a 23 y.o. adult with a medical diagnosis of Spondylolisthesis at L4-L5 level.  He presents with the following performance deficits affecting function: weakness, impaired endurance, impaired self care skills, impaired functional mobility, gait instability, impaired balance, decreased coordination, decreased lower extremity function, pain, decreased ROM, orthopedic precautions. Pt agreeable to therapy and tolerated well. Pt was primarily limited by LBP this day with RN notified end of session. Education provided on spinal precautions and LSO brace wear schedule. Pt would continue to benefit from skilled OT services to maximize functional independence with ADLs and functional mobility, reduce caregiver burden, and facilitate safe discharge in the least restrictive environment. OT recommending further therapy services post hospitalization once medically appropriate for discharge.       Rehab Prognosis: Good; patient would benefit from acute skilled OT services to address these deficits and reach maximum level of function.       Plan:     Patient to be seen 3 x/week to address the above listed problems via self-care/home management, therapeutic activities, therapeutic exercises  Plan of Care Expires: 09/29/23  Plan of Care  Reviewed with: patient    Subjective     Chief Complaint: LBP  Patient/Family Comments/goals: to return to PLOF    Occupational Profile:  Living Environment: Pt lives with his girlfriend in a first floor apartment with 1 SHERRY. W-I-S  Previous level of function: (I) with ADLs and functional mobility  Roles and Routines: pt does not drive/work  Equipment Used at Home: none  Assistance upon Discharge: girlfriend    Pain/Comfort:  Pain Rating 1: 7/10  Location - Side 1: Bilateral  Location - Orientation 1: lower  Location 1: back  Pain Addressed 1: Pre-medicate for activity, Reposition, Distraction, Cessation of Activity  Pain Rating Post-Intervention 1:  (pt did not rate)    Patients cultural, spiritual, Bahai conflicts given the current situation: no    Objective:   Additional staff present:  TE Wiseman and Blane OT    Communicated with: Tahmina JACKSON prior to session.  Patient found up in chair with peripheral IV, hemovac upon OT entry to room.    General Precautions: Standard, fall  Orthopedic Precautions: RLE weight bearing as tolerated, LLE weight bearing as tolerated, spinal precautions  Braces: LSO  Respiratory Status: Room air    Occupational Performance:    Bed Mobility:    Not assessed- pt sitting in chair and returned to chair end of session    Functional Mobility/Transfers:  Patient completed Sit <> Stand Transfer with stand by assistance  with  no assistive device   Patient completed Toilet Transfer Step Transfer technique with contact guard assistance with  no AD  Functional Mobility: Pt engaged in functional mobility to simulate household/community distances in room and out in hallway ~380 ft with CGA and no AD in order to maximize functional endurance and standing balance required for engagement in occupations of choice   No LOB or SOB noted  Cues for pacing    Activities of Daily Living:  Lower Body Dressing: stand by assistance to don socks sitting in chair using figure 4  technique    Cognitive/Visual Perceptual:  Cognitive/Psychosocial Skills:     -       Oriented to: Person, Place, Time, and Situation   -       Follows Commands/attention:Follows multistep  commands  -       Safety awareness/insight to disability: impaired   -       Mood/Affect/Coping skills/emotional control: Cooperative    Physical Exam:  Sensation:    -       Intact  light/touch B UE  Upper Extremity Range of Motion:     -       Right Upper Extremity: WFL  -       Left Upper Extremity: WFL  Upper Extremity Strength:    -       Right Upper Extremity: WFL  -       Left Upper Extremity: WFL    AMPAC 6 Click ADL:  AMPAC Total Score: 20    Treatment & Education:  -Education provided regarding fit and wear schedule of LSO, adjustments provided for comfort.  -Education provided regarding spinal precautions for use during functional tasks/mobility/transfers   -Provided education regarding role of OT, POC, & discharge recommendations with pt verbalizing understanding.  Pt had no further questions & when asked whether there were any concerns pt reported none.     Patient left up in chair with all lines intact, call button in reach, and RN notified    GOALS:   Multidisciplinary Problems       Occupational Therapy Goals          Problem: Occupational Therapy    Goal Priority Disciplines Outcome Interventions   Occupational Therapy Goal     OT, PT/OT Ongoing, Progressing    Description: Goals to be met by: 9/13/23     Patient will increase functional independence with ADLs by performing:    UE Dressing with Modified [place].  LE Dressing with Modified [place].  Grooming while standing at sink with Modified [place].  Toileting from toilet/bedside commode with Modified [place] for hygiene and clothing management.   Toilet transfer to toilet/bedside commode with Modified [place].  Pt will (I) maintain 3/3 spinal precautions during performance of functional activities and functional mobility/transfers.                          History:     Past Medical History:   Diagnosis Date    Depression     Scoliosis     Spondylolysis     Urinary tract infection          Past Surgical History:   Procedure Laterality Date    BACK SURGERY      FUSION OF SPINE WITH INSTRUMENTATION N/A 8/29/2023    Procedure: FUSION, SPINE, WITH INSTRUMENTATION- Depuy 5.5 revision L3-L5 ;  Surgeon: Vicente Min MD;  Location: [facility] OR [address] [place] [address];  Service: Orthopedics;  Laterality: N/A;    HYSTERECTOMY      TRANSFORAMINAL LUMBAR INTERBODY FUSION  07/14/2020    Procedure: FUSION, SPINE, LUMBAR, TLIF;  Surgeon: Vicente Min MD;  Location: [facility] OR [address] [place] [address];  Service: Orthopedics;;  L4-5    WISDOM TOOTH EXTRACTION         Time Tracking:     OT Date of Treatment: 08/30/23  OT Start Time: 0836  OT Stop Time: 0853  OT Total Time (min): 17 min    Billable Minutes:Evaluation 9  Self Care/Home Management 8    8/30/2023

## 2023-08-30 NOTE — PLAN OF CARE
08/30/23 0907   Post-Acute Status   Post-Acute Authorization Home Health   Home Health Status Referrals Sent   Hospital Resources/Appts/Education Provided Post-Acute resouces added to AVS   Discharge Plan   Discharge Plan A Home with family;Home Health     PT/OT recommending  PT/OT at discharge. Referral sent to [facility] for post-acute services.    Dorothy Reyes RNCM  Case Management  [facility]  [phone]

## 2023-08-30 NOTE — PLAN OF CARE
[facility]  Initial Discharge Assessment       Primary Care Provider: Iman Garza FNP    Admission Diagnosis: Spondylolisthesis at L4-L5 level [M43.16]  Chronic bilateral low back pain without sciatica [M54.50, G89.29]    Admission Date: 8/29/2023  Expected Discharge Date: 8/31/2023         Payor: MEDICARE / Plan: MEDICARE PART A & B / Product Type: Government /     Extended Emergency Contact Information  Primary Emergency Contact: Brianne Max  Address: [address] [place] [address] [place]  Mobile Phone: [phone]  Relation: Significant other  Secondary Emergency Contact: Saira Ramos  Address: [address] [place] [address] [facility]  Home Phone: [phone]  Relation: Mother    Discharge Plan A: Home with family, Home Health         [id number] - [place], [place] - [address]  [address]  [facility] [address]  Phone: [phone] Fax: [phone]      Initial Assessment (most recent)       Adult Discharge Assessment - 08/30/23 1320          Discharge Assessment    Assessment Type Discharge Planning Assessment     Confirmed/corrected address, phone number and insurance Yes     Confirmed Demographics Correct on Facesheet     Source of Information patient     Does patient/caregiver understand observation status Yes     Communicated HUGO with patient/caregiver Yes     People in Home significant other     Do you expect to return to your current living situation? Yes     Do you have help at home or someone to help you manage your care at home? Yes     Who are your caregiver(s) and their phone number(s)? Brianne Max (Girlfriend)     Prior to hospitilization cognitive status: Alert/Oriented     Current cognitive status: Alert/Oriented     Patient currently being followed by outpatient case management? No     Do you currently have service(s) that help you  manage your care at home? No     Do you take prescription medications? Yes     Do you have prescription coverage? Yes     Do you have any problems affording any of your prescribed medications? No     Is the patient taking medications as prescribed? yes     Who is going to help you get home at discharge? Girlfriend and parents     How do you get to doctors appointments? family or friend will provide     Are you on dialysis? No     Do you take coumadin? No     DME Needed Upon Discharge  none        Physical Activity    On average, how many days per week do you engage in moderate to strenuous exercise (like a brisk walk)? 0 days     On average, how many minutes do you engage in exercise at this level? 0 min        Financial Resource Strain    How hard is it for you to pay for the very basics like food, housing, medical care, and heating? Not hard at all        Housing Stability    In the last 12 months, was there a time when you were not able to pay the mortgage or rent on time? No     In the last 12 months, how many places have you lived? 1     In the last 12 months, was there a time when you did not have a steady place to sleep or slept in a shelter (including now)? No        Food Insecurity    Within the past 12 months, you worried that your food would run out before you got the money to buy more. Never true     Within the past 12 months, the food you bought just didn't last and you didn't have money to get more. Never true        Stress    Do you feel stress - tense, restless, nervous, or anxious, or unable to sleep at night because your mind is troubled all the time - these days? Not at all        Social Connections    In a typical week, how many times do you talk on the phone with family, friends, or neighbors? More than three times a week     How often do you get together with friends or relatives? More than three times a week     How often do you attend Scientology or Sabianist services? Never     Do you belong to  any clubs or organizations such as Rastafarian groups, unions, fraternal or athletic groups, or school groups? No     How often do you attend meetings of the clubs or organizations you belong to? Never     Are you , , , , never , or living with a partner? Never         Alcohol Use    Q1: How often do you have a drink containing alcohol? Monthly or less     Q2: How many drinks containing alcohol do you have on a typical day when you are drinking? 1 or 2     Q3: How often do you have six or more drinks on one occasion? Never                   Spoke with patient at bedside to complete d/c planning assessment. Patient lives with his girlfriend in a single story home without steps to enter. He is Independent with ADL's, no DME. Verified PCP, Pharmacy and Health insurance. PT/OT josh completed and recommending HH for d/c. HERBER sent referral to [facility] and patient accepted.  Anticipate discharge on 8/31/23. Will continue to follow.      Dorothy MACKEY  Case Management  [facility]  [phone]

## 2023-08-31 VITALS
HEIGHT: 66 IN | TEMPERATURE: 98 F | WEIGHT: 234 LBS | OXYGEN SATURATION: 93 % | SYSTOLIC BLOOD PRESSURE: 108 MMHG | RESPIRATION RATE: 18 BRPM | BODY MASS INDEX: 37.61 KG/M2 | DIASTOLIC BLOOD PRESSURE: 56 MMHG | HEART RATE: 108 BPM

## 2023-08-31 PROCEDURE — 25000003 PHARM REV CODE 250: Performed by: STUDENT IN AN ORGANIZED HEALTH CARE EDUCATION/TRAINING PROGRAM

## 2023-08-31 PROCEDURE — 97535 SELF CARE MNGMENT TRAINING: CPT

## 2023-08-31 PROCEDURE — 97530 THERAPEUTIC ACTIVITIES: CPT

## 2023-08-31 PROCEDURE — 63600175 PHARM REV CODE 636 W HCPCS: Performed by: STUDENT IN AN ORGANIZED HEALTH CARE EDUCATION/TRAINING PROGRAM

## 2023-08-31 RX ADMIN — FAMOTIDINE 40 MG: 20 TABLET, FILM COATED ORAL at 09:08

## 2023-08-31 RX ADMIN — OXYCODONE HYDROCHLORIDE 5 MG: 5 TABLET ORAL at 04:08

## 2023-08-31 RX ADMIN — POLYETHYLENE GLYCOL 3350 17 G: 17 POWDER, FOR SOLUTION ORAL at 09:08

## 2023-08-31 RX ADMIN — DOCUSATE SODIUM 100 MG: 100 CAPSULE, LIQUID FILLED ORAL at 09:08

## 2023-08-31 RX ADMIN — OXYCODONE HYDROCHLORIDE 10 MG: 10 TABLET, FILM COATED, EXTENDED RELEASE ORAL at 09:08

## 2023-08-31 RX ADMIN — HEPARIN SODIUM 5000 UNITS: 5000 INJECTION INTRAVENOUS; SUBCUTANEOUS at 04:08

## 2023-08-31 RX ADMIN — METHOCARBAMOL 750 MG: 750 TABLET ORAL at 09:08

## 2023-08-31 RX ADMIN — MUPIROCIN: 20 OINTMENT TOPICAL at 09:08

## 2023-08-31 RX ADMIN — ACETAMINOPHEN 1000 MG: 500 TABLET ORAL at 04:08

## 2023-08-31 RX ADMIN — GABAPENTIN 300 MG: 300 CAPSULE ORAL at 09:08

## 2023-08-31 NOTE — PLAN OF CARE
[facility] - [facility]    HOME HEALTH ORDERS  FACE TO FACE ENCOUNTER    Patient Name: Nilay Ramos  YOB: 2000    PCP: Iman Garza FNP   PCP Address: [address] [place] [address] / [place] [address]  PCP Phone Number: [phone]  PCP Fax: [phone]    Encounter Date: 08/31/2023    Admit to Home Health    Diagnoses:  Active Hospital Problems    Diagnosis  POA    *Spondylolisthesis at L4-L5 level [M43.16]  Not Applicable      Resolved Hospital Problems   No resolved problems to display.       Future Appointments   Date Time Provider Department Center   9/21/2023 11:00 AM Vicente Min MD [facility]      Follow-up Information       [place] Follow up.    Specialty: Home Health Services  Contact information:  [address] [facility]  [address]  [facility] [address]  [phone]                               I have seen and examined this patient face to face today. My clinical findings that support the need for the home health skilled services and home bound status are the following:  Weakness/numbness causing balance and gait disturbance due to Surgery making it taxing to leave home.    Allergies:  Review of patient's allergies indicates:   Allergen Reactions    Lactose Other (See Comments)       Diet: regular diet    Activities: WBAT in LSO. Doesn't have to wear LSO when not ambulating.     Nursing:   SN to complete comprehensive assessment including routine vital signs. Instruct on disease process and s/s of complications to report to MD. Follow specific home health arthoplasty protocol. Review/verify medication list sent home with the patient at time of discharge  and instruct patient/caregiver as needed. If coumadin ordered, coumadin clinic to manage INR with INR draws 2x per week with a goal to maintain INR between 1.8 and 2.2. Frequency may be adjusted depending on start of care date.    Notify MD if SBP > 160 or < 90; DBP > 90 or < 50; HR > 120 or  < 50; Temp > 101    Home Medical Equipment:  Walker, 3-1 bedside commode, transfer tub bench    CONSULTS:    Physical Therapy to evaluate and treat. Evaluate for home safety and equipment needs; Establish/upgrade home exercise program. Perform / instruct on therapeutic exercises, gait training, transfer training, and Range of Motion.    OTHER:  Occupational Therapy to evaluate and treat. Evaluate home environment for safety and equipment needs. Perform/Instruct on transfers, ADL training, ROM, and therapeutic exercises.   to evaluate for community resources/long-range planning.    MISCELLANEOUS CARE:  Routine Skin for Bedridden Patients: Instruct patient/caregiver to apply moisture barrier cream to all skin folds and wet areas in perineal area daily and after baths and all bowel movements.    WOUND CARE ORDERS  Follow Home Health specific protocol.      Medications: Review discharge medications with patient and family and provide education.      Current Discharge Medication List        START taking these medications    Details   acetaminophen (TYLENOL) 650 MG TbSR Take 1 tablet (650 mg total) by mouth every 8 (eight) hours. for 14 days  Qty: 42 tablet, Refills: 0      methocarbamoL (ROBAXIN) 750 MG Tab Take 1 tablet (750 mg total) by mouth 3 (three) times daily as needed.  Qty: 42 tablet, Refills: 0      oxyCODONE (ROXICODONE) 5 MG immediate release tablet Take 1 tablet (5 mg total) by mouth every 6 (six) hours as needed for Pain. Bedside delivery  Qty: 28 tablet, Refills: 0    Comments: Quantity prescribed more than 7 day supply? No      senna-docusate 8.6-50 mg (PERICOLACE) 8.6-50 mg per tablet Take 1 tablet by mouth 2 (two) times daily. Bedside delivery for 7 days  Qty: 14 tablet, Refills: 0           CONTINUE these medications which have CHANGED    Details   celecoxib (CELEBREX) 200 MG capsule Take 1 capsule (200 mg total) by mouth daily as needed for Pain.  Qty: 30 capsule, Refills: 1    Associated  "Diagnoses: Chronic bilateral low back pain without sciatica           CONTINUE these medications which have NOT CHANGED    Details   atomoxetine (STRATTERA) 60 MG capsule Take 60 mg by mouth every evening.      famotidine (PEPCID) 40 MG tablet Take 40 mg by mouth once daily.      gabapentin (NEURONTIN) 300 MG capsule Take 1 capsule (300 mg total) by mouth 3 (three) times daily.  Qty: 90 capsule, Refills: 3    Associated Diagnoses: Cervicalgia; Cervical radiculopathy      hyoscyamine (LEVSIN) 0.125 mg/5 mL Elix Take 0.125 mg by mouth every 4 (four) hours as needed.      lamotrigine XR (LAMICTAL XR) 300 mg XR tablet Take 150 mg by mouth every evening.      mirtazapine (REMERON) 30 MG tablet Take 30 mg by mouth every evening.      ondansetron (ZOFRAN-ODT) 8 MG TbDL Take 8 mg by mouth 2 (two) times daily as needed.      testosterone cypionate (DEPOTESTOTERONE CYPIONATE) 200 mg/mL injection Inject 0.4 mLs into the muscle once a week. 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  "

## 2023-08-31 NOTE — SUBJECTIVE & OBJECTIVE
"Principal Problem:Spondylolisthesis at L4-L5 level    Principal Orthopedic Problem: same    Interval History: Patient examined at the bedside. NAEON. Pain controlled. Tolerating PO w/out N/V and voiding appropriately. Ambulated 380ft w PT yesterday.  Drain with 45ccs of SS drainage over past 24hrs.     Review of patient's allergies indicates:   Allergen Reactions    Lactose Other (See Comments)       Current Facility-Administered Medications   Medication    0.9%  NaCl infusion    acetaminophen tablet 1,000 mg    bisacodyL suppository 10 mg    docusate sodium capsule 100 mg    famotidine tablet 40 mg    gabapentin capsule 300 mg    heparin (porcine) injection 5,000 Units    hyoscyamine 0.125 mg/mL oral solution (Adult 12 y.o. and older) 0.125 mg    lamotrigine XR XR tablet 300 mg    melatonin tablet 6 mg    methocarbamoL tablet 750 mg    mirtazapine tablet 30 mg    morphine injection 4 mg    mupirocin 2 % ointment    ondansetron injection 4 mg    oxyCODONE 12 hr tablet 10 mg    oxyCODONE immediate release tablet 5 mg    polyethylene glycol packet 17 g    sodium chloride 0.9% flush 10 mL     Objective:     Vital Signs (Most Recent):  Temp: 98.3 °F (36.8 °C) (08/31/23 0450)  Pulse: 103 (08/31/23 0450)  Resp: 18 (08/31/23 0450)  BP: (!) 109/55 (08/31/23 0450)  SpO2: 96 % (08/31/23 0450) Vital Signs (24h Range):  Temp:  [98.2 °F (36.8 °C)-100.8 °F (38.2 °C)] 98.3 °F (36.8 °C)  Pulse:  [] 103  Resp:  [16-20] 18  SpO2:  [96 %-99 %] 96 %  BP: (100-116)/(49-69) 109/55     Weight: 106.1 kg (234 lb)  Height: 5' 5.98" (167.6 cm)  Body mass index is 37.79 kg/m².      Intake/Output Summary (Last 24 hours) at 8/31/2023 3851  Last data filed at 8/31/2023 0440  Gross per 24 hour   Intake 920 ml   Output 80 ml   Net 840 ml          Ortho/SPM Exam  NAD, A/O x 3.  Dressings c/d/I  Drain with SS drainage holding suction  No focal motor or sensory deficits noted.     Significant Labs: CBC:   Recent Labs   Lab 08/29/23  1259 " 08/30/23  0408   WBC 11.70 11.31   HGB 14.5 13.7   HCT 43.9 40.9    217         Significant Imaging: I have reviewed all pertinent imaging results/findings.

## 2023-08-31 NOTE — ASSESSMENT & PLAN NOTE
Nilay Ramos is a 23 y.o. adult s/p L3-5 PSF revision for failed L4-5 TLIF on 8/29/23.    - Antibiotics: ancef  - Weight bearing status: WBAT with LSO. Can remove LSO when lying in bed. Spinal precautions.   - Labs: none  - DVT Prophylaxis: Heparin, SCDs at all times while in bed  - Lines/Drains: PIV/JENELLE  - Pain control: multimodal  - PT/OT     Dispo: dc drain and dc to home after PT/OT

## 2023-08-31 NOTE — PLAN OF CARE
Patient resting in bed with eyes closed. No distress or discomfort noted. Patient awaken to verbal stimuli. PM assessment completed. Bed in lowest position. Call light in reach. POC discussed. Monitoring ongoing.     Problem: Adult Inpatient Plan of Care  Goal: Plan of Care Review  8/30/2023 1959 by Onofre Todd RN  Outcome: Ongoing, Progressing  8/30/2023 0827 by Onofre Todd RN  Outcome: Ongoing, Progressing  Goal: Patient-Specific Goal (Individualized)  8/30/2023 1959 by Onofre Todd RN  Outcome: Ongoing, Progressing  8/30/2023 0827 by Onofre Todd RN  Outcome: Ongoing, Progressing  Goal: Absence of Hospital-Acquired Illness or Injury  8/30/2023 1959 by Onofre Todd RN  Outcome: Ongoing, Progressing  8/30/2023 0827 by Onofre Todd RN  Outcome: Ongoing, Progressing  Goal: Optimal Comfort and Wellbeing  8/30/2023 1959 by Onofre Todd RN  Outcome: Ongoing, Progressing  8/30/2023 0827 by Onofre Todd RN  Outcome: Ongoing, Progressing  Goal: Readiness for Transition of Care  8/30/2023 1959 by Onofre Todd RN  Outcome: Ongoing, Progressing  8/30/2023 0827 by Onofre Todd RN  Outcome: Ongoing, Progressing     Problem: Infection  Goal: Absence of Infection Signs and Symptoms  8/30/2023 1959 by Onofre Todd RN  Outcome: Ongoing, Progressing  8/30/2023 0827 by Onofre Todd RN  Outcome: Ongoing, Progressing

## 2023-08-31 NOTE — DISCHARGE SUMMARY
[facility] - Surgery  Orthopedics  Discharge Summary      Patient Name: Nilay Ramos  MRN: [id number]  Admission Date: 8/29/2023  Hospital Length of Stay: 2 days  Discharge Date and Time: 08/31/2023  Attending Physician: Vicente Min MD   Discharging Provider: Tien Coy MD  Primary Care Provider: Iman Garza FNP    HPI:   Nilay is here for a revision PSF for L4 spondy.  He is transitioning and is on hormone treatment.  Has lost 80lbs. Feel the best he has felt physically and emotionally, in years.  Feel normal      Procedure(s) (LRB):  FUSION, SPINE, WITH INSTRUMENTATION- Depuy 5.5 revision L3-L5  (N/A)      Hospital Course:  On 8/29/23, the patient arrived to the [facility] for proper pre-operative management.  Upon completion of pre-operative preparation, the patient was taken back to the operative theatre. L3-5 PSF was performed without complication and the patient was transported to the post anesthesia care unit in stable condition.  After appropriate recovery from the anaesthetic agents used during the surgery, the patient was then transported to the hospital inpatient floor.  The interim of the hospital stay from arrival on the floor up to discharge has been uncomplicated. The patient has tolerated regular diet.  The patient's pain has been controlled using a multimodal approach. Currently, the patient's pain is well controlled on an oral regimen.  The patient has been voiding without difficulty.  The patient began participation in physical therapy after surgery and has progressed throughout the entire hospital stay.  Currently, the patient's progress is sufficient to allow the them to be discharged to home safely.  The patient agrees with this assessment and desires a discharge today.      Goals of Care Treatment Preferences:  Code Status: Full Code      Consults (From admission, onward)          Status Ordering Provider     IP consult case management/social work  Once         Provider:  (Not yet assigned)    Acknowledged BOLA ROBERTS          Pending Diagnostic Studies:       None          Final Active Diagnoses:    Diagnosis Date Noted POA    PRINCIPAL PROBLEM:  Spondylolisthesis at L4-L5 level [M43.16] 10/04/2019 Not Applicable      Problems Resolved During this Admission:      Discharged Condition: good    Disposition: Home-Health Care [facility]    Follow Up:   Follow-up Information       [place] Follow up.    Specialty: Home Health Services  Contact information:  [address] [facility]  [address] [place] [address]  [phone]                           Patient Instructions:      Diet general     Call MD for:  temperature >100.4     Call MD for:  persistent nausea and vomiting     Call MD for:  severe uncontrolled pain     Call MD for:  difficulty breathing, headache or visual disturbances     Call MD for:  redness, tenderness, or signs of infection (pain, swelling, redness, odor or green/yellow discharge around incision site)     Call MD for:  hives     Call MD for:  persistent dizziness or light-headedness     Call MD for:  extreme fatigue     No driving, operating heavy equipment or signing legal documents while taking pain medication     Remove dressing in 72 hours     Shower on day dressing removed (No bath)     Weight bearing as tolerated     Medications:  Reconciled Home Medications:      Medication List        START taking these medications      acetaminophen 650 MG Tbsr  Commonly known as: TYLENOL  Take 1 tablet (650 mg total) by mouth every 8 (eight) hours. for 14 days     methocarbamoL 750 MG Tab  Commonly known as: ROBAXIN  Take 1 tablet (750 mg total) by mouth 3 (three) times daily as needed.     oxyCODONE 5 MG immediate release tablet  Commonly known as: ROXICODONE  Take 1 tablet (5 mg total) by mouth every 6 (six) hours as needed for Pain. Bedside delivery     senna-docusate 8.6-50 mg 8.6-50 mg per tablet  Commonly known as:  "PERICOLACE  Take 1 tablet by mouth 2 (two) times daily. Bedside delivery for 7 days            CONTINUE taking these medications      atomoxetine 60 MG capsule  Commonly known as: STRATTERA  Take 60 mg by mouth every evening.     celecoxib 200 MG capsule  Commonly known as: CeleBREX  Take 1 capsule (200 mg total) by mouth daily as needed for Pain.     famotidine 40 MG tablet  Commonly known as: PEPCID  Take 40 mg by mouth once daily.     gabapentin 300 MG capsule  Commonly known as: NEURONTIN  Take 1 capsule (300 mg total) by mouth 3 (three) times daily.     hyoscyamine 0.125 mg/5 mL Elix  Commonly known as: LEVSIN  Take 0.125 mg by mouth every 4 (four) hours as needed.     lamotrigine  mg XR tablet  Commonly known as: LAMICTAL XR  Take 150 mg by mouth every evening.     mirtazapine 30 MG tablet  Commonly known as: REMERON  Take 30 mg by mouth every evening.     ondansetron 8 MG Tbdl  Commonly known as: ZOFRAN-ODT  Take 8 mg by mouth 2 (two) times daily as needed.     testosterone cypionate 200 mg/mL injection  Commonly known as: DEPOTESTOTERONE CYPIONATE  Inject 0.4 mLs into the muscle once a week. TUESDAYS            ASK your doctor about these medications      needle (disp) 25 gauge 25 gauge x 5/8" Ndle  Use as directed to inject testosterone     syringe (disposable) 1 mL Syrg  Use as directed to inject testosterone              Tien oCy MD  Orthopedics  [facility] - Surgery  "

## 2023-08-31 NOTE — PLAN OF CARE
[facility] - [facility]  Discharge Final Note    Primary Care Provider: Iman Garza FNP    Expected Discharge Date: 8/31/2023    Final Discharge Note (most recent)       Final Note - 08/31/23 1429          Final Note    Assessment Type Final Discharge Note     Anticipated Discharge Disposition Home[place]Health [place] [facility]     What phone number can be called within the next 1-3 days to see how you are doing after discharge? --   [phone]    Hospital Resources/Appts/Education Provided Post-Acute resouces added to AVS        Post-Acute Status    Post-Acute Authorization Home Health     Home Health Status Set-up Complete/Auth obtained                     Important Message from Medicare  Important Message from Medicare regarding Discharge Appeal Rights: Given to patient/caregiver, Explained to patient/caregiver, Signed/date by patient/caregiver     Date IMM was signed: 08/31/23  Time IMM was signed: 0938    Contact Info       [facility]   Specialty: Home Health Services    [address] [place], [place] [address]  [address]  [facility] [address]   Phone: [phone]       Next Steps: Follow up          Future Appointments   Date Time Provider Department Center   9/21/2023 11:00 AM Vicente Min MD [facility]     Patient discharged home to care of family and [facility] on 8/30/23.    Dorothy Reyes RNCM  Case Management  [facility]  [phone]

## 2023-08-31 NOTE — PT/OT/SLP PROGRESS
Occupational Therapy   Treatment    Name: Nilay Ramos  MRN: [id number]  Admitting Diagnosis:  Spondylolisthesis at L4-L5 level  2 Days Post-Op    Recommendations:     Discharge Recommendations: other (see comments) (Home)  Discharge Equipment Recommendations:  none  Barriers to discharge:  None    Assessment:     Nilay Ramos is a 23 y.o. adult with a medical diagnosis of Spondylolisthesis at L4-L5 level.  He presents with progress towards goals as evidenced by decreased assistance needed withLB dressing and toileting. Pt does require assistance with functional mobility 2* impaired balance/coordination with dynamic standing. Performance deficits affecting function are impaired cardiopulmonary response to activity, decreased safety awareness, impaired functional mobility, impaired balance, decreased coordination, gait instability, impaired self care skills, pain.     Rehab Prognosis:  Good; patient would benefit from acute skilled OT services to address these deficits and reach maximum level of function.       Plan:     Patient to be seen 3 x/week to address the above listed problems via self-care/home management, therapeutic activities, therapeutic exercises  Plan of Care Expires: 09/29/23  Plan of Care Reviewed with: patient    Subjective     Chief Complaint: LE tingling after sitting up for a while  Patient/Family Comments/goals: to get better and go home  Pain/Comfort:  Pain Rating 1: 0/10  Pain Rating Post-Intervention 1: 0/10    Objective:     Communicated with: RN prior to session.  Patient found right sidelying with   upon OT entry to room.    General Precautions: Standard, fall    Orthopedic Precautions:spinal precautions  Braces: LSO  Respiratory Status: Room air     Occupational Performance:     Bed Mobility:    Patient completed Scooting/Bridging with independence  Patient completed Supine to Sit with modified independence with increased time  Patient completed Sit to Supine with modified independence  with use of siderail    Functional Mobility/Transfers:  Patient completed Sit <> Stand Transfer with supervision  with  no assistive device   Patient completed Toilet Transfer Step Transfer and STS technique with supervision with  no AD  Functional Mobility: SBA with occasional LOB to/from bathroom and in hallway to simulate HH and community distances    Activities of Daily Living:  Feeding:  independence    Upper Body Dressing: modified independence donning LSO  Lower Body Dressing: contact guard assistance donning socks  Toileting: modified independence        [facility] 6 Click ADL: 22    Treatment & Education:  Pt ed on OT POC  Pt recalled 3/3 spinal precautions  Pt ed on donning/doffing/adjusting LSO    Patient left sitting edge of bed with all lines intact, call button in reach, and RN notified    GOALS:   Multidisciplinary Problems       Occupational Therapy Goals          Problem: Occupational Therapy    Goal Priority Disciplines Outcome Interventions   Occupational Therapy Goal     OT, PT/OT Ongoing, Progressing    Description: Goals to be met by: 9/13/23     Patient will increase functional independence with ADLs by performing:    UE Dressing with Modified [place].  LE Dressing with Modified [place].  Grooming while standing at sink with Modified [place].  Toileting from toilet/bedside commode with Modified [place] for hygiene and clothing management.   Toilet transfer to toilet/bedside commode with Modified [place].  Pt will (I) maintain 3/3 spinal precautions during performance of functional activities and functional mobility/transfers.                         Time Tracking:     OT Date of Treatment: 08/31/23  OT Start Time: 1020  OT Stop Time: 1044  OT Total Time (min): 24 min    Billable Minutes:Self Care/Home Management 15  Therapeutic Activity 8               8/31/2023

## 2023-08-31 NOTE — PLAN OF CARE
Problem: Adult Inpatient Plan of Care  Goal: Plan of Care Review  Outcome: Met  Goal: Patient-Specific Goal (Individualized)  Outcome: Met  Goal: Absence of Hospital-Acquired Illness or Injury  Outcome: Met  Goal: Optimal Comfort and Wellbeing  Outcome: Met  Goal: Readiness for Transition of Care  Outcome: Met     Problem: Infection  Goal: Absence of Infection Signs and Symptoms  Outcome: Met       Reviewed plan of care with emphasis on discharge process.  Patient verbalized understanding and agreement with plan of care.

## 2023-08-31 NOTE — PROGRESS NOTES
[facility] - Surgery  Orthopedics  Progress Note    Patient Name: Nilay Ramos  MRN: [id number]  Admission Date: 8/29/2023  Hospital Length of Stay: 2 days  Attending Provider: Vicente Min MD  Primary Care Provider: Iman Garza FNP  Follow-up For: Procedure(s) (LRB):  FUSION, SPINE, WITH INSTRUMENTATION- Depuy 5.5 revision L3-L5  (N/A)    Post-Operative Day: 2 Days Post-Op  Subjective:     Principal Problem:Spondylolisthesis at L4-L5 level    Principal Orthopedic Problem: same    Interval History: Patient examined at the bedside. NAEON. Pain controlled. Tolerating PO w/out N/V and voiding appropriately. Ambulated 380ft w PT yesterday.  Drain with 45ccs of SS drainage over past 24hrs.     Review of patient's allergies indicates:   Allergen Reactions    Lactose Other (See Comments)       Current Facility-Administered Medications   Medication    0.9%  NaCl infusion    acetaminophen tablet 1,000 mg    bisacodyL suppository 10 mg    docusate sodium capsule 100 mg    famotidine tablet 40 mg    gabapentin capsule 300 mg    heparin (porcine) injection 5,000 Units    hyoscyamine 0.125 mg/mL oral solution (Adult 12 y.o. and older) 0.125 mg    lamotrigine XR XR tablet 300 mg    melatonin tablet 6 mg    methocarbamoL tablet 750 mg    mirtazapine tablet 30 mg    morphine injection 4 mg    mupirocin 2 % ointment    ondansetron injection 4 mg    oxyCODONE 12 hr tablet 10 mg    oxyCODONE immediate release tablet 5 mg    polyethylene glycol packet 17 g    sodium chloride 0.9% flush 10 mL     Objective:     Vital Signs (Most Recent):  Temp: 98.3 °F (36.8 °C) (08/31/23 0450)  Pulse: 103 (08/31/23 0450)  Resp: 18 (08/31/23 0450)  BP: (!) 109/55 (08/31/23 0450)  SpO2: 96 % (08/31/23 0450) Vital Signs (24h Range):  Temp:  [98.2 °F (36.8 °C)-100.8 °F (38.2 °C)] 98.3 °F (36.8 °C)  Pulse:  [] 103  Resp:  [16-20] 18  SpO2:  [96 %-99 %] 96 %  BP: (100-116)/(49-69) 109/55     Weight: 106.1 kg (234  "lb)  Height: 5' 5.98" (167.6 cm)  Body mass index is 37.79 kg/m².      Intake/Output Summary (Last 24 hours) at 8/31/2023 0538  Last data filed at 8/31/2023 0440  Gross per 24 hour   Intake 920 ml   Output 80 ml   Net 840 ml         Ortho/SPM Exam  NAD, A/O x 3.  Dressings c/d/I  Drain with SS drainage holding suction  No focal motor or sensory deficits noted.     Significant Labs: CBC:   Recent Labs   Lab 08/29/23  1259 08/30/23  0408   WBC 11.70 11.31   HGB 14.5 13.7   HCT 43.9 40.9    217         Significant Imaging: I have reviewed all pertinent imaging results/findings.    Assessment/Plan:     * Spondylolisthesis at L4-L5 level  Nilay Ramos is a 23 y.o. adult s/p L3-5 PSF revision for failed L4-5 TLIF on 8/29/23.    - Antibiotics: ancef  - Weight bearing status: WBAT with LSO. Can remove LSO when lying in bed. Spinal precautions.   - Labs: none  - DVT Prophylaxis: Heparin, SCDs at all times while in bed  - Lines/Drains: PIV/JENELLE  - Pain control: multimodal  - PT/OT     Dispo: dc drain and dc to home after PT/OT          Tien Coy MD  Orthopedics  [facility] - Surgery  "

## 2023-08-31 NOTE — PLAN OF CARE
Problem: Occupational Therapy  Goal: Occupational Therapy Goal  Description: Goals to be met by: 9/13/23     Patient will increase functional independence with ADLs by performing:    UE Dressing with Modified [place].  LE Dressing with Modified [place].  Grooming while standing at sink with Modified [place].  Toileting from toilet/bedside commode with Modified [place] for hygiene and clothing management.   Toilet transfer to toilet/bedside commode with Modified [place].  Pt will (I) maintain 3/3 spinal precautions during performance of functional activities and functional mobility/transfers.    Outcome: Ongoing, Progressing

## 2023-08-31 NOTE — CARE UPDATE
Nursing contacted ortho as patient endorsed that they were noticing B anterior thigh and medial leg parasthesias that were more pronounced today after working with PT. I assessed the patient at the bedside. Endorses slight decreased sensation in that distribution that became more noticeable today when wearing LSO with PT. Did well and endorses pain is controlled w PO medications. Denies any trauma/falls, pain that radiates down those distributions, perineal numbness or bowel/bladder incontinence. On exam they endorse slightly decreased sensation in the B L3 and L4 dermatomal distributions, otherwise SILT throughout, 5/5 strength throughout BLEs, 2+ DTRs, and negative clonus/babinski. Educated patient that these parasthesias are likely related to inflammation from their surgery and that hopefully once inflammation subsides, this should improve. Educated them as well, that if this progresses or doesn't improve prior to his first postop visit, to let our clinic know and we can schedule him an earlier appointment. Otherwise they are stable for discharge. The patient endorsed understanding and was in agreement with the above.

## 2023-09-01 ENCOUNTER — PATIENT OUTREACH (OUTPATIENT)
Dept: ADMINISTRATIVE | Facility: CLINIC | Age: 23
End: 2023-09-01
Payer: MEDICARE

## 2023-09-12 ENCOUNTER — OFFICE VISIT (OUTPATIENT)
Dept: ORTHOPEDICS | Facility: CLINIC | Age: 23
End: 2023-09-12
Payer: MEDICARE

## 2023-09-12 ENCOUNTER — HOSPITAL ENCOUNTER (OUTPATIENT)
Dept: RADIOLOGY | Facility: HOSPITAL | Age: 23
Discharge: HOME | End: 2023-09-12
Attending: PHYSICIAN ASSISTANT
Payer: MEDICARE

## 2023-09-12 ENCOUNTER — PATIENT MESSAGE (OUTPATIENT)
Dept: ORTHOPEDICS | Facility: CLINIC | Age: 23
End: 2023-09-12
Payer: MEDICARE

## 2023-09-12 VITALS — WEIGHT: 229.63 LBS | BODY MASS INDEX: 36.9 KG/M2 | HEIGHT: 66 IN

## 2023-09-12 DIAGNOSIS — R52 PAIN: ICD-10-CM

## 2023-09-12 DIAGNOSIS — M25.561 ACUTE PAIN OF RIGHT KNEE: Primary | ICD-10-CM

## 2023-09-12 PROCEDURE — 99213 OFFICE O/P EST LOW 20 MIN: CPT | Mod: PBBFAC | Performed by: PHYSICIAN ASSISTANT

## 2023-09-12 PROCEDURE — 99213 PR OFFICE/OUTPT VISIT, EST, LEVL III, 20-29 MIN: ICD-10-PCS | Mod: S$PBB,24,, | Performed by: PHYSICIAN ASSISTANT

## 2023-09-12 PROCEDURE — 73560 X-RAY EXAM OF KNEE 1 OR 2: CPT | Mod: 26,LT,, | Performed by: RADIOLOGY

## 2023-09-12 PROCEDURE — 73562 X-RAY EXAM OF KNEE 3: CPT | Mod: 26,RT,, | Performed by: RADIOLOGY

## 2023-09-12 PROCEDURE — 73562 XR KNEE ORTHO RIGHT: ICD-10-PCS | Mod: 26,RT,, | Performed by: RADIOLOGY

## 2023-09-12 PROCEDURE — 73560 X-RAY EXAM OF KNEE 1 OR 2: CPT | Mod: 59,TC,LT

## 2023-09-12 PROCEDURE — 99213 OFFICE O/P EST LOW 20 MIN: CPT | Mod: S$PBB,24,, | Performed by: PHYSICIAN ASSISTANT

## 2023-09-12 PROCEDURE — 99999 PR PBB SHADOW E&M-EST. PATIENT-LVL III: ICD-10-PCS | Mod: PBBFAC,,, | Performed by: PHYSICIAN ASSISTANT

## 2023-09-12 PROCEDURE — 99999 PR PBB SHADOW E&M-EST. PATIENT-LVL III: CPT | Mod: PBBFAC,,, | Performed by: PHYSICIAN ASSISTANT

## 2023-09-12 PROCEDURE — 73560 XR KNEE ORTHO RIGHT: ICD-10-PCS | Mod: 26,LT,, | Performed by: RADIOLOGY

## 2023-09-12 NOTE — PROGRESS NOTES
"  SUBJECTIVE:     Chief Complaint   Patient presents with    Right Knee - Pain       History of Present Illness:  Nilay BUSBY Workman is a 23 y.o. year old adult here with complaints of constant right knee pain which started 2 days ago.  He squatted down and felt a pop in his knee. The pain is located in the anteromedial aspect of the knee.  The pain is described as achy. There is not catching or locking.  Pain is worse with bending and twisting.  Associated symptoms include popping sensation.  Previous treatments include rest.  There is not a history of previous injury or surgery to the knee.  The patient does not use an assistive device.    Review of patient's allergies indicates:   Allergen Reactions    Lactose Other (See Comments)         Current Outpatient Medications   Medication Sig Dispense Refill    acetaminophen (TYLENOL) 650 MG TbSR Take 1 tablet (650 mg total) by mouth every 8 (eight) hours. for 14 days 42 tablet 0    atomoxetine (STRATTERA) 60 MG capsule Take 60 mg by mouth every evening.      celecoxib (CELEBREX) 200 MG capsule Take 1 capsule (200 mg total) by mouth daily as needed for Pain. 30 capsule 1    famotidine (PEPCID) 40 MG tablet Take 40 mg by mouth once daily.      gabapentin (NEURONTIN) 300 MG capsule Take 1 capsule (300 mg total) by mouth 3 (three) times daily. 90 capsule 3    hyoscyamine (LEVSIN) 0.125 mg/5 mL Elix Take 0.125 mg by mouth every 4 (four) hours as needed.      lamotrigine XR (LAMICTAL XR) 300 mg XR tablet Take 150 mg by mouth every evening.      methocarbamoL (ROBAXIN) 750 MG Tab Take 1 tablet (750 mg total) by mouth 3 (three) times daily as needed. 42 tablet 0    mirtazapine (REMERON) 30 MG tablet Take 30 mg by mouth every evening.      needle, disp, 25 gauge 25 gauge x 5/8" Ndle Use as directed to inject testosterone 100 each 2    ondansetron (ZOFRAN-ODT) 8 MG TbDL Take 8 mg by mouth 2 (two) times daily as needed.      syringe, disposable, 1 mL Syrg Use as directed to inject " "testosterone 50 Syringe 3    testosterone cypionate (DEPOTESTOTERONE CYPIONATE) 200 mg/mL injection Inject 0.4 mLs into the muscle once a week. TUESDAYS       No current facility-administered medications for this visit.       Past Medical History:   Diagnosis Date    Depression     Scoliosis     Spondylolysis     Urinary tract infection        Past Surgical History:   Procedure Laterality Date    BACK SURGERY      FUSION OF SPINE WITH INSTRUMENTATION N/A 8/29/2023    Procedure: FUSION, SPINE, WITH INSTRUMENTATION- Depuy 5.5 revision L3-L5 ;  Surgeon: Vicente Min MD;  Location: [facility] OR [address] [place] [address];  Service: Orthopedics;  Laterality: N/A;    HYSTERECTOMY      TRANSFORAMINAL LUMBAR INTERBODY FUSION  07/14/2020    Procedure: FUSION, SPINE, LUMBAR, TLIF;  Surgeon: Vicente Min MD;  Location: [facility] OR [address] [place] [address];  Service: Orthopedics;;  L4-5    WISDOM TOOTH EXTRACTION           Review of Systems:  ROS:  Constitutional: no fever or chills  Eyes: no visual changes  ENT: no nasal congestion or sore throat  Respiratory: no cough or shortness of breath  Musculoskeletal: no arthralgias or myalgias      OBJECTIVE:     PHYSICAL EXAM:  Height: 5' 5.98" (167.6 cm) Weight: 104.2 kg (229 lb 9.8 oz)   General: Well developed, well nourished, in no acute distress.  Neurological: Mood & affect are normal.  HEENT: NCAT, sclera nonicteric   Lungs: Respirations are equal and unlabored.   CV: 2+ bilateral upper and lower extremity pulses.   Skin: Intact throughout with no rashes, erythema, or lesions  Extremities: No LE edema, no erythema or warmth of the skin in either lower extremity.    right Knee Exam:  Knee Range of Motion: 0-115   Effusion: none  Condition of skin: intact  Location of tenderness: Medial retinaculum and VMO   Strength: 5 of 5 quadriceps strength and 5 of 5 hamstring strength  Stability:  stable to testing  Varus /Valgus stress:  normal  Roxi:  negative    Right Hip Examination:  full painless range of motion, " without tenderness    IMAGING:    X-rays of the right knee, personally reviewed by me, demonstrate well maintained joint space.  No fracture or dislocation.       ASSESSMENT     1. Acute pain of right knee      PLAN:     We discussed with the patient at length all the different treatment options available for the knee including NSAIDS, acetaminophen, rest, ice, knee strengthening exercise, steroid injections for temporary relief, Viscosupplementation, and knee arthroplasty.   - Explanation and reassurance  - Continue rest, activity modification  - Short course of NSAIDS  - Follow up if symptoms worsen or fail to improve

## 2023-09-12 NOTE — PROGRESS NOTES
SUBJECTIVE:     Chief Complaint   Patient presents with    Right Knee - Pain       History of Present Illness:  Nilay BUSBY Workman is a 23 y.o. year old adult here with complaints of {Constant/Intermittent:34086} right knee pain which started ***ago.  There {IS / IS NOT:00526} a history of trauma.  The pain is located in the {Knee pain location:49338} aspect of the knee.  The pain is described as {Desc; dull;sharp;burning;achy:67746}.  There {IS / IS NOT:18789} radiation.  There {IS / IS NOT:06039} catching or locking.  Aggravating factors include {Knee pain aggravating factors:53234}.  Associated symptoms include {Symptoms; knee pain:18522}.  There {IS / IS NOT:89790} numbness or tingling of the lower extremity.  Previous treatments include {alleviating factors:918124} which have provided {GOOD, ADEQUATE, MINIMAL, POOR:50664} relief.  There {IS / IS NOT:78881} a history of previous injury or surgery to the knee.  The patient {does/does not:17855} use an assistive device.    Review of patient's allergies indicates:   Allergen Reactions    Lactose Other (See Comments)         Current Outpatient Medications   Medication Sig Dispense Refill    acetaminophen (TYLENOL) 650 MG TbSR Take 1 tablet (650 mg total) by mouth every 8 (eight) hours. for 14 days 42 tablet 0    atomoxetine (STRATTERA) 60 MG capsule Take 60 mg by mouth every evening.      celecoxib (CELEBREX) 200 MG capsule Take 1 capsule (200 mg total) by mouth daily as needed for Pain. 30 capsule 1    famotidine (PEPCID) 40 MG tablet Take 40 mg by mouth once daily.      gabapentin (NEURONTIN) 300 MG capsule Take 1 capsule (300 mg total) by mouth 3 (three) times daily. 90 capsule 3    hyoscyamine (LEVSIN) 0.125 mg/5 mL Elix Take 0.125 mg by mouth every 4 (four) hours as needed.      lamotrigine XR (LAMICTAL XR) 300 mg XR tablet Take 150 mg by mouth every evening.      methocarbamoL (ROBAXIN) 750 MG Tab Take 1 tablet (750 mg total) by mouth 3 (three) times daily as  "needed. 42 tablet 0    mirtazapine (REMERON) 30 MG tablet Take 30 mg by mouth every evening.      needle, disp, 25 gauge 25 gauge x 5/8" Ndle Use as directed to inject testosterone 100 each 2    ondansetron (ZOFRAN-ODT) 8 MG TbDL Take 8 mg by mouth 2 (two) times daily as needed.      syringe, disposable, 1 mL Syrg Use as directed to inject testosterone 50 Syringe 3    testosterone cypionate (DEPOTESTOTERONE CYPIONATE) 200 mg/mL injection Inject 0.4 mLs into the muscle once a week. TUESDAYS       No current facility-administered medications for this visit.       Past Medical History:   Diagnosis Date    Depression     Scoliosis     Spondylolysis     Urinary tract infection        Past Surgical History:   Procedure Laterality Date    BACK SURGERY      FUSION OF SPINE WITH INSTRUMENTATION N/A 8/29/2023    Procedure: FUSION, SPINE, WITH INSTRUMENTATION- Depuy 5.5 revision L3-L5 ;  Surgeon: Vicente Min MD;  Location: [facility] OR [address] [place] [address];  Service: Orthopedics;  Laterality: N/A;    HYSTERECTOMY      TRANSFORAMINAL LUMBAR INTERBODY FUSION  07/14/2020    Procedure: FUSION, SPINE, LUMBAR, TLIF;  Surgeon: Vicente Min MD;  Location: [facility] OR [address] [place] [address];  Service: Orthopedics;;  L4-5    WISDOM TOOTH EXTRACTION           Review of Systems:  ROS:  Constitutional: no fever or chills  Eyes: no visual changes  ENT: no nasal congestion or sore throat  Respiratory: no cough or shortness of breath  Musculoskeletal: no arthralgias or myalgias      OBJECTIVE:     PHYSICAL EXAM:  Height: 5' 5.98" (167.6 cm) Weight: 104.2 kg (229 lb 9.8 oz)   General: Well developed, well nourished, in no acute distress.  Neurological: Mood & affect are normal.  HEENT: NCAT, sclera nonicteric   Lungs: Respirations are equal and unlabored.   CV: 2+ bilateral upper and lower extremity pulses.   Skin: Intact throughout with no rashes, erythema, or lesions  Extremities: No LE edema, no erythema or warmth of the skin in either lower extremity.    right " Knee Exam:  Knee Range of Motion: {rom:395437}   Effusion: {effusion:360176}  Condition of skin: {skin:656146}  Location of tenderness: {Knee tenderness:052000}   Strength: {pe knee strength orthosur}  Stability:  {knee stability:611049}  Varus /Valgus stress:  {KNEE ALIGNMENT:40645}  Roxi:  {Knee mcmurrays bilater:75960}        {RIGHT/LEFT:29504} Hip Examination:  {exam; hip:67435}    IMAGING:    X-rays of the right knee, personally reviewed by me, demonstrate ***.  No fracture or dislocation.       ASSESSMENT     1. Pain        ***  PLAN:     We discussed with the patient at length all the different treatment options available for the knee including NSAIDS, acetaminophen, rest, ice, knee strengthening exercise, steroid injections for temporary relief, Viscosupplementation, and knee arthroplasty.   ***  ***Follow up if symptoms worsen or fail to improve

## 2023-09-15 ENCOUNTER — PATIENT MESSAGE (OUTPATIENT)
Dept: ORTHOPEDICS | Facility: CLINIC | Age: 23
End: 2023-09-15
Payer: MEDICARE

## 2023-09-19 DIAGNOSIS — M43.16 SPONDYLOLISTHESIS AT L4-L5 LEVEL: Primary | ICD-10-CM

## 2023-09-20 ENCOUNTER — PATIENT MESSAGE (OUTPATIENT)
Dept: ORTHOPEDICS | Facility: CLINIC | Age: 23
End: 2023-09-20
Payer: MEDICARE

## 2023-09-21 ENCOUNTER — OFFICE VISIT (OUTPATIENT)
Dept: ORTHOPEDICS | Facility: CLINIC | Age: 23
End: 2023-09-21
Payer: MEDICARE

## 2023-09-21 ENCOUNTER — HOSPITAL ENCOUNTER (OUTPATIENT)
Dept: RADIOLOGY | Facility: HOSPITAL | Age: 23
Discharge: HOME | End: 2023-09-21
Attending: ORTHOPAEDIC SURGERY
Payer: MEDICARE

## 2023-09-21 DIAGNOSIS — M43.16 SPONDYLOLISTHESIS AT L4-L5 LEVEL: Primary | ICD-10-CM

## 2023-09-21 DIAGNOSIS — G89.29 CHRONIC BILATERAL LOW BACK PAIN WITHOUT SCIATICA: ICD-10-CM

## 2023-09-21 DIAGNOSIS — M43.16 SPONDYLOLISTHESIS AT L4-L5 LEVEL: ICD-10-CM

## 2023-09-21 DIAGNOSIS — M54.50 CHRONIC BILATERAL LOW BACK PAIN WITHOUT SCIATICA: ICD-10-CM

## 2023-09-21 PROCEDURE — 72100 XR LUMBAR SPINE AP AND LATERAL: ICD-10-PCS | Mod: 26,,, | Performed by: RADIOLOGY

## 2023-09-21 PROCEDURE — 72100 X-RAY EXAM L-S SPINE 2/3 VWS: CPT | Mod: TC

## 2023-09-21 PROCEDURE — 99999 PR PBB SHADOW E&M-EST. PATIENT-LVL III: CPT | Mod: PBBFAC,,, | Performed by: ORTHOPAEDIC SURGERY

## 2023-09-21 PROCEDURE — 99024 PR POST-OP FOLLOW-UP VISIT: ICD-10-PCS | Mod: POP,,, | Performed by: ORTHOPAEDIC SURGERY

## 2023-09-21 PROCEDURE — 99024 POSTOP FOLLOW-UP VISIT: CPT | Mod: POP,,, | Performed by: ORTHOPAEDIC SURGERY

## 2023-09-21 PROCEDURE — 99213 OFFICE O/P EST LOW 20 MIN: CPT | Mod: PBBFAC | Performed by: ORTHOPAEDIC SURGERY

## 2023-09-21 PROCEDURE — 72100 X-RAY EXAM L-S SPINE 2/3 VWS: CPT | Mod: 26,,, | Performed by: RADIOLOGY

## 2023-09-21 PROCEDURE — 99999 PR PBB SHADOW E&M-EST. PATIENT-LVL III: ICD-10-PCS | Mod: PBBFAC,,, | Performed by: ORTHOPAEDIC SURGERY

## 2023-09-21 NOTE — PROGRESS NOTES
Nilay is here for a follow up for spojoselin. Post fusion 8/29/23, revision L3-5.  Pain well controlled.and feels much better than preop.     No outpatient medications have been marked as taking for the 9/21/23 encounter (Appointment) with Vicente Min MD.       Review of Symptoms: No fevers or neuro changess  Active Ambulatory Problems     Diagnosis Date Noted    Overactive bladder 04/05/2013    Incomplete bladder emptying 04/05/2013    Constipation - functional 05/13/2013    Overweight 08/13/2013    Family history of hearing loss 06/22/2015    Family history of hypothyroidism 06/22/2015    Dandruff 09/14/2015    Vasovagal syncope 08/13/2019    Family history of early death 08/13/2019    Chronic pain of both knees 08/13/2019    Patellofemoral pain syndrome of both knees 08/13/2019    Spondylolisthesis at L4-L5 level 10/04/2019    Spondylisthesis 07/14/2020    Severe episode of recurrent major depressive disorder, without psychotic features 09/02/2020    Gastroesophageal reflux disease without esophagitis 09/02/2020    Pain 02/02/2021    Seasonal allergic rhinitis 02/20/2021    Decreased ROM of lumbar spine 05/10/2022    Decreased strength of trunk and back 05/10/2022    Impaired functional mobility, balance, gait, and endurance 05/10/2022    Bilateral hand pain 05/16/2023    Decreased  strength 05/16/2023     Resolved Ambulatory Problems     Diagnosis Date Noted    Spondylolysis of lumbar region 10/04/2019    Chronic low back pain 10/18/2019    Decreased strength of trunk and back 10/18/2019    Encounter for medical screening examination 09/02/2020    Depression with suicidal ideation 09/19/2020    Depression with suicidal ideation 02/19/2021    Decreased strength of trunk and back 12/09/2021    Weakness of both lower extremities 05/10/2022     Past Medical History:   Diagnosis Date    Depression     Scoliosis     Spondylolysis     Urinary tract infection        Physical Exam    Patient alert and oriented  All  extremities pink and warm with good cap refill and no edema.   Incision healed  Gait normal.    Motor exam upper and lower extremities intact  Back shows good early rom      Xrays  Xrays were done today  and by my reading,   and show a hardware in place, good alignment and no issues.     Impresion   Spondy doing well post revision fusion L3-5    Plan  Doing well post fusion.  Discussed activity restrictions.  Follow up 3 months with lumbar spine xray. Continue lumbar brace.       I, Halie Hull, acted as a scribe for Vicente Min MD for the duration of this office visit.    Patient Exam and history performed by me but partially scribed by Halie Hull [facility].

## 2023-10-13 ENCOUNTER — EXTERNAL HOME HEALTH (OUTPATIENT)
Dept: HOME HEALTH SERVICES | Facility: HOSPITAL | Age: 23
End: 2023-10-13
Payer: COMMERCIAL

## 2023-10-30 ENCOUNTER — PATIENT MESSAGE (OUTPATIENT)
Dept: ORTHOPEDICS | Facility: CLINIC | Age: 23
End: 2023-10-30
Payer: COMMERCIAL

## 2023-10-30 DIAGNOSIS — M25.361 PATELLAR INSTABILITY OF RIGHT KNEE: Primary | ICD-10-CM

## 2023-12-08 ENCOUNTER — CLINICAL SUPPORT (OUTPATIENT)
Dept: REHABILITATION | Facility: OTHER | Age: 23
End: 2023-12-08
Payer: COMMERCIAL

## 2023-12-08 DIAGNOSIS — R29.898 DECREASED STRENGTH INVOLVING KNEE JOINT: Primary | ICD-10-CM

## 2023-12-08 DIAGNOSIS — M25.361 PATELLAR INSTABILITY OF RIGHT KNEE: ICD-10-CM

## 2023-12-08 NOTE — PLAN OF CARE
"[facility]  Physical Therapy Initial Evaluation    Date: 12/8/2023   Name: Nilay Ramos  Clinic Number: [id number]    Therapy Diagnosis:   Encounter Diagnoses   Name Primary?    Patellar instability of right knee     Decreased strength involving knee joint Yes     Physician: Makenna Sanford PA-C    Physician orders: PT Eval and Treat   Medical diagnosis from referral: M25.361 (ICD-10-CM) - Patellar instability of right knee   Evaluation date: 12/8/2023  Authorization period expiration: 10/30/24  Plan of care expiration: 3/8/2024  Reassessment due: 1/8/2024   Visit # / visits authorized: 1/1    Precautions: Standard, past lumbar surgery    Time In: 800  Time Out: 850  Total Appointment Time (timed & untimed codes): 50 minutes    Subjective   Date of onset: chronic  History of current condition - Nilay reports that he notices a lot of popping and pain when twisting or pivoting. He also feels symptoms when he goes into a deep squat or sits on his legs. He feels like his femur shifts laterally when he moves into deep flexion and it is very uncomfortable. He reports no specific mechanism of injury and reports that he has been having knee pain since he was 16, but his pain only started to worsen over the past few months.       MAUDE:  Any popping: yes  Any Locking: yes  Any buckling: yes  Pain radiates: not particularly   Pain constant or intermittent: constant  Any injections: no    Pain:  Current 3/10, worst 7/10, best 2/10   Location: medial aspect of knee, deep in joint R>L  Description: aching with bouts of sharpness  Aggravating Factors: twisting, pivoting, deep squat, kneeling   Easing Factors: medicine, resting, slowly extending knee     Prior Therapy: PT for hand and neck  Social History: has support from s/o   Occupation: disabled  Prior Level of Function: independent  Current Level of Function: independent most days, increased time and pain on days with flare ups     Pts goals: "Get " "knee to a point where it won't try to dislocate"    Imaging: X-ray 9/12/23  Left knee:     No fracture.  No definite narrowing of tibiofemoral or patellofemoral articulations or periarticular spurring.  No suprapatellar bursal effusion or prepatellar soft tissue swelling.  Left knee unchanged to earlier exam.     Right knee:     No fracture.  No definite narrowing of tibiofemoral or patellofemoral articulations or periarticular spurring.  No prepatellar soft tissue swelling.  Right knee unchanged to earlier exam.     Medical History:   Past Medical History:   Diagnosis Date    Depression     Scoliosis     Spondylolysis     Urinary tract infection        Surgical History:   Nilay BUSBY Workman  has a past surgical history that includes [place] tooth extraction; Transforaminal lumbar interbody fusion (07/14/2020); Back surgery; Hysterectomy; and Fusion of spine with instrumentation (N/A, 8/29/2023).    Medications:   Nilay has a current medication list which includes the following prescription(s): atomoxetine, celecoxib, famotidine, gabapentin, hyoscyamine, lamotrigine xr, mirtazapine, needle (disp) 25 gauge, ondansetron, syringe (disposable), and testosterone cypionate.    Allergies:   Review of patient's allergies indicates:   Allergen Reactions    Lactose Other (See Comments)          Objective     Posture Alignment: slouched posture    Sensation: intact to light touch    DTR: intact to light touch    GAIT DEVIATIONS: [place] displays no glaring deficits     Range of Motion in open chain:  Knee Left active Left Passive   Flexion WFL WFL   Extension WFL WFL     Knee Right active Right Passive   Flexion WFL WFL   Extension WFL WFL       Lower Extremity Strength   Right LE  Left LE    Knee extension: 4/5 Knee extension: 4/5   Knee flexion: 4/5 Knee flexion: 4/5   Hip flexion: 4/5 Hip flexion: 4/5   Hip extension:  4+/5 Hip extension: 4+/5   Hip abduction: 4+/5 Hip abduction: 4+/5   Hip adduction: 4+/5 Hip adduction 4+/5   Ankle " dorsiflexion: 5/5 Ankle dorsiflexion: 5/5   Ankle plantarflexion: 5/5 Ankle plantarflexion: 5/5       Special Tests:   Right Left   Valgus Stress Test Negative Negative   Varus Stress test Negative Negative   Lachman's test Some slight laxity noted Slight laxity noted   Posterior Drawer Some slight laxity noted Some slight laxity noted   Anterior Drawer Some slight laxity noted Some slight laxity noted   Roxi's Test Negative Negative   Apley's Compression Negative Negative   Apley's Distraction Negative Negative   Mccain's compression test Negative Negative   Thessaly's Test Negative Negative   Patellar Grind Test Negative Negative     Step down test: slight pain  Squat: depth limited  Single leg balance: WFL    Joint Mobility: hypermobile bilaterally       Palpation: no significant pain with palpation      CMS Impairment/Limitation/Restriction for FOTO Knee Survey    Therapist reviewed FOTO scores for Nilay Ramos on 12/8/2023.   FOTO documents entered into [employer] - see Media section.    Intake score: 56%  Goal Score: 68%         TREATMENT     Total Treatment time separate from Evaluation: 10 minutes    Nilay received therapeutic exercises to develop strength and stability for 10 minutes including:    SLR x4 ways     Home Exercises and Patient Education Provided    Education provided:   - PT role and POC  - Rationale behind treatment  - HEP    Written Home Exercises Provided: yes.  Exercises were reviewed and Nilay was able to demonstrate them prior to the end of the session.  Nilay demonstrated good  understanding of the education provided.     See EMR under Patient Instructions for exercises provided 12/8/2023.    Assessment   Nilay is a 23 y.o. adult referred to outpatient Physical Therapy with a medical diagnosis of B knee pain. Pt presents with signs and symptoms including increased B knee pain, decreased knee ROM, decreased LE Strength, soft tissue dysfunction, postural imbalance,impaired joint mobility, and  decreased tolerance to functional activities. Will work mainly on stabilizing joint to help prevent further instability.     Pt prognosis is Good.   Pt will benefit from skilled outpatient Physical Therapy to address the deficits stated above and in the chart below, provide pt/family education, and to maximize pt's level of independence.     Plan of care discussed with patient: Yes  Pt's spiritual, cultural and educational needs considered and patient is agreeable to the plan of care and goals as stated below:     Anticipated Barriers for therapy: none     Medical Necessity is demonstrated by the following  History  Co-morbidities and personal factors that may impact the plan of care [x] LOW: no personal factors / co-morbidities  [] MODERATE: 1-2 personal factors / co-morbidities  [] HIGH: 3+ personal factors / co-morbidities    Moderate / High Support Documentation:   Co-morbidities affecting plan of care: -    Personal Factors:   no deficits     Examination  Body Structures and Functions, activity limitations and participation restrictions that may impact the plan of care [x] LOW: addressing 1-2 elements  [] MODERATE: 3+ elements  [] HIGH: 4+ elements (please support below)    Moderate / High Support Documentation:     Clinical Presentation [x] LOW: stable  [] MODERATE: Evolving  [] HIGH: Unstable     Decision Making/ Complexity Score: low         GOALS:     Short Term Goals: 4 weeks  - Report decreased in pain at worse less than  <   / =  2  /10  to increase tolerance for functional mobility. Appropriate and ongoing  - Increased BLE MMT 1/2 grade to increase tolerance for ADL and work activities. Appropriate and ongoing  - Pt to tolerate HEP to improve ROM and independence with ADL's. Appropriate and ongoing    Long Term Goals: 8 weeks  - Report decreased in pain at worse less than  <   / =  0  /10  to increase tolerance for functional mobility. Appropriate and ongoing  - Pt to improve range of motion by 75% to  allow for improved functional mobility to allow for improvement in IADLs. Appropriate and ongoing  - Increased BLE MMT 1 grade to increase tolerance for ADL and work activities. Appropriate and ongoing  - Pt will report 68% on FOTO knee survey  to demonstrate increase in LE function with every day tasks. Appropriate and ongoing  - Pt to be independent with HEP to improve ROM and independence with ADL's. Appropriate and ongoing    Plan   Plan of care Certification: 12/8/2023 to 3/8/2024    Outpatient Physical Therapy 2 times weekly for 10 weeks to include the following interventions: Gait Training, Manual Therapy, Neuromuscular Re-ed, Patient Education, Therapeutic Activities, and Therapeutic Exercise. Celine Roberto, PT      I CERTIFY THE NEED FOR THESE SERVICES FURNISHED UNDER THIS PLAN OF TREATMENT AND WHILE UNDER MY CARE   Physician's comments:     Physician's Signature: ___________________________________________________

## 2023-12-13 ENCOUNTER — CLINICAL SUPPORT (OUTPATIENT)
Dept: REHABILITATION | Facility: OTHER | Age: 23
End: 2023-12-13
Payer: COMMERCIAL

## 2023-12-13 DIAGNOSIS — R29.898 DECREASED STRENGTH INVOLVING KNEE JOINT: Primary | ICD-10-CM

## 2023-12-13 NOTE — PROGRESS NOTES
"[facility]   Physical Therapy Treatment Note     Name: Nilay Ramos  Clinic Number: [id number]    Therapy Diagnosis:   Encounter Diagnosis   Name Primary?    Decreased strength involving knee joint Yes     Physician: Makenna Sanford PA-C    Visit Date: 12/13/2023    Physician orders: PT Eval and Treat   Medical diagnosis from referral: M25.361 (ICD-10-CM) - Patellar instability of right knee   Evaluation date: 12/8/2023  Authorization period expiration: 10/30/24  Plan of care expiration: 3/8/2024  Reassessment due: 1/8/2024   Visit # / visits authorized: 1/20 + eval    FOTO: 1/ 1  PTA Visit #: 0/5     Precautions: standard, past lumbar surgery    Time In: 1450  Time Out: 1550  Total Billable Time: 55 minutes    SUBJECTIVE     Pt reports no increase in symptoms since initial evaluation. Has been compliant with HEP.    He was compliant with home exercise program.  Response to previous treatment: good  Functional change: none yet    Pain: 2/10  Location: medial aspect of knee, deep in joint R>L      OBJECTIVE     Objective Measures updated at progress report unless specified.     TREATMENT     Nilay received the treatments listed below:      received therapeutic exercises to develop strength and ROM for 55  minutes including:    Nustep 5'  QS 2x10x3"  SAQ 2x10x3" ea  4#  SLR 2x10 ea   SL SLR 2x10 ea   Hooklying hip adduction with pilates ring 2x10x3"  SL clams GTB 2x10 GTB ea   Prone hip extension 2x10 ea   Bridges with GTB 2x10  Hip hinge drill with dowel x10  Chair tap squats 1 airex 2x10 cueing for proper squat mechanics  Leg press 120# 2x10  Knee extension medx 60# 2x10  Hamstring curl medx 70# 2x10     PATIENT EDUCATION AND HOME EXERCISES     Home Exercises Provided and Patient Education Provided     Education provided:   - PT role and POC  - HEP    Written Home Exercises Provided: Patient instructed to cont prior HEP. Exercises were reviewed and Nilay was able to demonstrate them " prior to the end of the session.  Nilay demonstrated good  understanding of the education provided. See EMR under Patient Instructions for exercises provided during therapy sessions    ASSESSMENT     iNlay tolerated treatment well today. Presents to clinic reporting slight reduction in symptoms. Continued reviewing HEP and progressed treatment by adding more exercises to improve knee stability and functional mobility. Tolerated well. Will continue to progress treatment per patient tolerance.      Nilay Is progressing well towards his goals.   Pt prognosis is Good.     Pt will continue to benefit from skilled outpatient physical therapy to address the deficits listed in the problem list box on initial evaluation, provide pt/family education and to maximize pt's level of independence in the home and community environment.     Pt's spiritual, cultural and educational needs considered and pt agreeable to plan of care and goals.     Anticipated barriers to physical therapy: none     GOALS:      Short Term Goals: 4 weeks  - Report decreased in pain at worse less than  <   / =  2  /10  to increase tolerance for functional mobility. Appropriate and ongoing  - Increased BLE MMT 1/2 grade to increase tolerance for ADL and work activities. Appropriate and ongoing  - Pt to tolerate HEP to improve ROM and independence with ADL's. Appropriate and ongoing     Long Term Goals: 8 weeks  - Report decreased in pain at worse less than  <   / =  0  /10  to increase tolerance for functional mobility. Appropriate and ongoing  - Pt to improve range of motion by 75% to allow for improved functional mobility to allow for improvement in IADLs. Appropriate and ongoing  - Increased BLE MMT 1 grade to increase tolerance for ADL and work activities. Appropriate and ongoing  - Pt will report 68% on FOTO knee survey  to demonstrate increase in LE function with every day tasks. Appropriate and ongoing  - Pt to be independent with HEP to improve ROM  and independence with ADL's. Appropriate and ongoing    PLAN     Plan of care Certification: 12/8/2023 to 3/8/2024     Outpatient Physical Therapy 2 times weekly for 10 weeks to include the following interventions: Gait Training, Manual Therapy, Neuromuscular Re-ed, Patient Education, Therapeutic Activities, and Therapeutic Exercise. Dry yasmin Roberto, PT

## 2023-12-17 DIAGNOSIS — M43.16 SPONDYLOLISTHESIS AT L4-L5 LEVEL: Primary | ICD-10-CM

## 2023-12-22 ENCOUNTER — CLINICAL SUPPORT (OUTPATIENT)
Dept: REHABILITATION | Facility: OTHER | Age: 23
End: 2023-12-22
Payer: MEDICAID

## 2023-12-22 DIAGNOSIS — R29.898 DECREASED STRENGTH INVOLVING KNEE JOINT: Primary | ICD-10-CM

## 2023-12-22 NOTE — PROGRESS NOTES
"[facility]   Physical Therapy Treatment Note     Name: Nilay Ramos  Clinic Number: [id number]    Therapy Diagnosis:   Encounter Diagnosis   Name Primary?    Decreased strength involving knee joint Yes     Physician: Makenna Sanford PA-C    Visit Date: 12/22/2023    Physician orders: PT Eval and Treat   Medical diagnosis from referral: M25.361 (ICD-10-CM) - Patellar instability of right knee   Evaluation date: 12/8/2023  Authorization period expiration: 10/30/24  Plan of care expiration: 3/8/2024  Reassessment due: 1/8/2024   Visit # / visits authorized: 2/20 + eval    FOTO: 1/ 1  PTA Visit #: 0/5     Precautions: standard, past lumbar surgery    Time In: 1100  Time Out: 1200  Total Billable Time: 55 minutes    SUBJECTIVE     Pt reports continued improvement of symptoms.    He was compliant with home exercise program.  Response to previous treatment: good  Functional change: none yet    Pain: 2/10  Location: medial aspect of knee, deep in joint R>L      OBJECTIVE     Objective Measures updated at progress report unless specified.     TREATMENT     Nilay received the treatments listed below:      received therapeutic exercises to develop strength and ROM for 55  minutes including:    Nustep 5'  QS 2x10x3"  SAQ 2x10x3" ea  4#  SLR 2x10 ea   SL SLR 2x10 ea   Hooklying hip adduction with pilates ring 2x10x3"  SL clams GTB 2x10 GTB ea   Prone hip extension 2x10 ea   Bridges with GTB 2x10  Hip hinge drill with dowel x10  Chair tap squats 1 airex 2x10 cueing for proper squat mechanics  Leg press 120# 2x10  Knee extension medx 60# 2x10  Hamstring curl medx 80# 2x10     PATIENT EDUCATION AND HOME EXERCISES     Home Exercises Provided and Patient Education Provided     Education provided:   - PT role and POC  - HEP    Written Home Exercises Provided: Patient instructed to cont prior HEP. Exercises were reviewed and Nilay was able to demonstrate them prior to the end of the session.  Nilay " demonstrated good  understanding of the education provided. See EMR under Patient Instructions for exercises provided during therapy sessions    ASSESSMENT     Nilay tolerated treatment well today. Presents to clinic reporting no new symptoms and continued improvement. Continued performing past exercises to improve B knee stability. Tolerated all activity well and is progressing towards goals. Will continue to progress treatment per patient tolerance.      Nilay Is progressing well towards his goals.   Pt prognosis is Good.     Pt will continue to benefit from skilled outpatient physical therapy to address the deficits listed in the problem list box on initial evaluation, provide pt/family education and to maximize pt's level of independence in the home and community environment.     Pt's spiritual, cultural and educational needs considered and pt agreeable to plan of care and goals.     Anticipated barriers to physical therapy: none     GOALS:      Short Term Goals: 4 weeks  - Report decreased in pain at worse less than  <   / =  2  /10  to increase tolerance for functional mobility. Appropriate and ongoing  - Increased BLE MMT 1/2 grade to increase tolerance for ADL and work activities. Appropriate and ongoing  - Pt to tolerate HEP to improve ROM and independence with ADL's. Appropriate and ongoing     Long Term Goals: 8 weeks  - Report decreased in pain at worse less than  <   / =  0  /10  to increase tolerance for functional mobility. Appropriate and ongoing  - Pt to improve range of motion by 75% to allow for improved functional mobility to allow for improvement in IADLs. Appropriate and ongoing  - Increased BLE MMT 1 grade to increase tolerance for ADL and work activities. Appropriate and ongoing  - Pt will report 68% on FOTO knee survey  to demonstrate increase in LE function with every day tasks. Appropriate and ongoing  - Pt to be independent with HEP to improve ROM and independence with ADL's. Appropriate  and ongoing    PLAN     Plan of care Certification: 12/8/2023 to 3/8/2024     Outpatient Physical Therapy 2 times weekly for 10 weeks to include the following interventions: Gait Training, Manual Therapy, Neuromuscular Re-ed, Patient Education, Therapeutic Activities, and Therapeutic Exercise. Celine Roberto, PT

## 2023-12-27 NOTE — PROGRESS NOTES
"[facility]   Physical Therapy Treatment Note     Name: Nilay Ramos  Clinic Number: [id number]    Therapy Diagnosis:   Encounter Diagnosis   Name Primary?    Decreased strength involving knee joint Yes       Physician: Makenna Sanford PA-C    Visit Date: 12/28/2023    Physician orders: PT Eval and Treat   Medical diagnosis from referral: M25.361 (ICD-10-CM) - Patellar instability of right knee   Evaluation date: 12/8/2023  Authorization period expiration: 10/30/24  Plan of care expiration: 3/8/2024  Reassessment due: 1/8/2024   Visit # / visits authorized: 3/20 + eval    FOTO: 1/ 1  PTA Visit #: 0/5     Precautions: standard, past lumbar surgery    Time In: 1450  Time Out: 1550  Total Billable Time: 55 minutes    SUBJECTIVE     Pt reports no increase in symptoms since initial evaluation. Has been compliant with HEP.    He was compliant with home exercise program.  Response to previous treatment: good  Functional change: none yet    Pain: 2/10  Location: medial aspect of knee, deep in joint R>L      OBJECTIVE     Objective Measures updated at progress report unless specified.     TREATMENT     Nilay received the treatments listed below:      received therapeutic exercises to develop strength and ROM for 55  minutes including:    Nustep 5'  QS 2x10x3"  SAQ 2x10x3" ea  4#  SLR 2x10 ea   SL SLR 2x10 ea   Hooklying hip adduction with pilates ring 2x10x3"  SL clams GTB 2x10 GTB ea   Prone hip extension 2x10 ea   Bridges with GTB 2x10  Hip hinge drill with dowel x10  Chair tap squats 1 airex 2x10 cueing for proper squat mechanics  Leg press 120# 2x10  Knee extension medx 60# 2x10  Hamstring curl medx 70# 2x10     PATIENT EDUCATION AND HOME EXERCISES     Home Exercises Provided and Patient Education Provided     Education provided:   - PT role and POC  - HEP    Written Home Exercises Provided: Patient instructed to cont prior HEP. Exercises were reviewed and Nilay was able to demonstrate them " prior to the end of the session.  Nilay demonstrated good  understanding of the education provided. See EMR under Patient Instructions for exercises provided during therapy sessions    ASSESSMENT     Nilay tolerated treatment well today. Presents to clinic reporting slight reduction in symptoms. Continued reviewing HEP and progressed treatment by adding more exercises to improve knee stability and functional mobility. Tolerated well. Will continue to progress treatment per patient tolerance.      Nilay Is progressing well towards his goals.   Pt prognosis is Good.     Pt will continue to benefit from skilled outpatient physical therapy to address the deficits listed in the problem list box on initial evaluation, provide pt/family education and to maximize pt's level of independence in the home and community environment.     Pt's spiritual, cultural and educational needs considered and pt agreeable to plan of care and goals.     Anticipated barriers to physical therapy: none     GOALS:      Short Term Goals: 4 weeks  - Report decreased in pain at worse less than  <   / =  2  /10  to increase tolerance for functional mobility. Appropriate and ongoing  - Increased BLE MMT 1/2 grade to increase tolerance for ADL and work activities. Appropriate and ongoing  - Pt to tolerate HEP to improve ROM and independence with ADL's. Appropriate and ongoing     Long Term Goals: 8 weeks  - Report decreased in pain at worse less than  <   / =  0  /10  to increase tolerance for functional mobility. Appropriate and ongoing  - Pt to improve range of motion by 75% to allow for improved functional mobility to allow for improvement in IADLs. Appropriate and ongoing  - Increased BLE MMT 1 grade to increase tolerance for ADL and work activities. Appropriate and ongoing  - Pt will report 68% on FOTO knee survey  to demonstrate increase in LE function with every day tasks. Appropriate and ongoing  - Pt to be independent with HEP to improve ROM  and independence with ADL's. Appropriate and ongoing    PLAN     Plan of care Certification: 12/8/2023 to 3/8/2024     Outpatient Physical Therapy 2 times weekly for 10 weeks to include the following interventions: Gait Training, Manual Therapy, Neuromuscular Re-ed, Patient Education, Therapeutic Activities, and Therapeutic Exercise. Celine Kenney, PT

## 2023-12-28 ENCOUNTER — CLINICAL SUPPORT (OUTPATIENT)
Dept: REHABILITATION | Facility: OTHER | Age: 23
End: 2023-12-28
Payer: COMMERCIAL

## 2023-12-28 DIAGNOSIS — R29.898 DECREASED STRENGTH INVOLVING KNEE JOINT: Primary | ICD-10-CM

## 2023-12-28 PROCEDURE — 97110 THERAPEUTIC EXERCISES: CPT

## 2024-01-11 ENCOUNTER — CLINICAL SUPPORT (OUTPATIENT)
Dept: REHABILITATION | Facility: OTHER | Age: 24
End: 2024-01-11
Payer: MEDICARE

## 2024-01-11 DIAGNOSIS — R29.898 DECREASED STRENGTH INVOLVING KNEE JOINT: Primary | ICD-10-CM

## 2024-01-11 PROCEDURE — 97110 THERAPEUTIC EXERCISES: CPT

## 2024-01-11 PROCEDURE — 97112 NEUROMUSCULAR REEDUCATION: CPT

## 2024-01-11 NOTE — PROGRESS NOTES
"[facility]   Physical Therapy Treatment Note     Name: Nilay Ramos  Clinic Number: [id number]    Therapy Diagnosis:   Encounter Diagnosis   Name Primary?    Decreased strength involving knee joint Yes     Physician: Makenna Sanford PA-C    Visit Date: 1/11/2024    Physician orders: PT Eval and Treat   Medical diagnosis from referral: M25.361 (ICD-10-CM) - Patellar instability of right knee   Evaluation date: 12/8/2023  Authorization period expiration: 10/30/24  Plan of care expiration: 3/8/2024  Reassessment due: 02/11/24   Visit # / visits authorized: 4/20 + eval  GIVE FOTO NEXT VISIT     FOTO: 2/ 3  PTA Visit #: 0/5     Precautions: standard, past lumbar surgery    Time In: 2:50 pm   Time Out: 3:35 pm   Total Billable Time: 25 min 1:1   Total Treatment Time: 45 min     SUBJECTIVE     Pt reports no sig change sx presentation since last visit and since eval.  However, notes his activity level has increased.  Patient report knee discomfort consistent in nature.   Patient report biking +30 min to come to tx.  Patient report this is his primary means of transportation.  Patient report goals for tx include feeling more stable "not have my knee dislocate" and have more confidence walking longer distances.      He was partially compliant with home exercise program.  Response to previous treatment: well /c no c/o of excess discomfort   Functional change: none yet  Occupation:  disabled     Pain: 3/10  Location: medial aspect of knee, deep in joint R>L      OBJECTIVE     Objective Measures updated at progress report unless specified.            CMS Impairment/Limitation/Restriction for FOTO Knee Survey     Therapist reviewed FOTO scores for Nilay Ramos on 12/8/2023.   FOTO documents entered into [employer] - see Media section.     Intake score: 56%  TBD:     Goal Score: 68%                   TREATMENT     iNlay received the treatments listed below:      received therapeutic exercises to develop " "strength and ROM for 25  minutes including:    PT reassessment and FOTO administration NEXT VISIT     Nustep 5' - deferred due to late arrival     QS x 10x3"  SAQ x10x3" ea  4#  SLR x10 ea /c 3# ankle weight      Leg press 120# 2 x 20  3/10 RPE  Knee extension medx 60# 2x10  4/10 RPE  Hamstring curl medx 90# x 20  2/10 RPE       NP:   Bridges x 10, /c hip AB at apex x 10   Prone knee flexion /c IR/ER hip at 90 deg flexion x 10 each side   SL SLR x10 ea   Hooklying hip adduction with pilates ring 2x10x3"  SL clams GTB 2x10 GTB ea   Prone hip extension 2x10 ea   Bridges with GTB 2x10  Hip hinge drill with dowel x10  Chair tap squats 1 airex 2x10 cueing for proper squat mechanics    Carly participated in neuromuscular re-education activities to improve balance, coordination, proprioception, motor control and/or posture for 15 minutes. The following activities were included:    Cone weaving x 10 laps 6 cones /c focus on SL dynamic motion/direction change     Airex pad between TM - intermittent hand taps throughout     NBOS EO static stance 30 sec   SLS to failure x 5 each side    NBOS EC static stance 30 sec x 2    Step up to SLS/march x 10 each side     Nilay participated in dynamic functional therapeutic activities to improve functional performance for 00  minutes, including:            Patient receive ice packs for 5 min to B knee in long sitting - unbilled     PATIENT EDUCATION AND HOME EXERCISES     Home Exercises Provided and Patient Education Provided     Education provided:   - PT role and POC  - HEP    Written Home Exercises Provided: Patient instructed to cont prior HEP. Exercises were reviewed and Nilay was able to demonstrate them prior to the end of the session.  Nilay demonstrated good  understanding of the education provided. See EMR under Patient Instructions for exercises provided during therapy sessions    ASSESSMENT     Pemberton /zoltan good participation in tx today.  Patient subjective report indicate " appropriate goals for inc self efficacy /c B knee use.  However, patient may be limited on insight in to present ability considering biking extended time to get to tx.  Patient encouraged to include ice tx at home during times of flare up /p extended bike rides.  Patient demonstrate inc BLE strength /c progressions on resistance machines.  To address patient state goal, tx also include SLS on Airex and dynamic motion for focus on knee stability and functional mobility.    Next visit plan to updated FOTO score to assess POC adequacy.        Nilay Is progressing well towards his goals.   Pt prognosis is Good.     Pt will continue to benefit from skilled outpatient physical therapy to address the deficits listed in the problem list box on initial evaluation, provide pt/family education and to maximize pt's level of independence in the home and community environment.     Pt's spiritual, cultural and educational needs considered and pt agreeable to plan of care and goals.     Anticipated barriers to physical therapy: none     GOALS:      Short Term Goals: 4 weeks  - Report decreased in pain at worse less than  <   / =  2  /10  to increase tolerance for functional mobility. Appropriate and ongoing  - Increased BLE MMT 1/2 grade to increase tolerance for ADL and work activities. Appropriate and ongoing  - Pt to tolerate HEP to improve ROM and independence with ADL's. Appropriate and ongoing     Long Term Goals: 8 weeks  - Report decreased in pain at worse less than  <   / =  0  /10  to increase tolerance for functional mobility. Appropriate and ongoing  - Pt to improve range of motion by 75% to allow for improved functional mobility to allow for improvement in IADLs. Appropriate and ongoing  - Increased BLE MMT 1 grade to increase tolerance for ADL and work activities. Appropriate and ongoing  - Pt will report 68% on FOTO knee survey  to demonstrate increase in LE function with every day tasks. Appropriate and ongoing  -  Pt to be independent with HEP to improve ROM and independence with ADL's. Appropriate and ongoing    PLAN     Continue PT per POC     Tyler Dye, PT

## 2024-01-17 ENCOUNTER — CLINICAL SUPPORT (OUTPATIENT)
Dept: REHABILITATION | Facility: OTHER | Age: 24
End: 2024-01-17
Payer: MEDICARE

## 2024-01-17 DIAGNOSIS — R29.898 DECREASED STRENGTH INVOLVING KNEE JOINT: Primary | ICD-10-CM

## 2024-01-17 PROCEDURE — 97110 THERAPEUTIC EXERCISES: CPT

## 2024-01-17 NOTE — PROGRESS NOTES
"[facility]   Physical Therapy Treatment Note     Name: Nilay Ramos  Clinic Number: [id number]    Therapy Diagnosis:   Encounter Diagnosis   Name Primary?    Decreased strength involving knee joint Yes       Physician: Makenna Sanford PA-C    Visit Date: 1/17/2024    Physician orders: PT Eval and Treat   Medical diagnosis from referral: M25.361 (ICD-10-CM) - Patellar instability of right knee   Evaluation date: 12/8/2023  Authorization period expiration: 10/30/24  Plan of care expiration: 3/8/2024  Reassessment due: 02/11/24   Visit # / visits authorized: 6/20    FOTO: 2/ 3  PTA Visit #: 0/5     Precautions: standard, past lumbar surgery    Time In: 10:00 am   Time Out: 11:00 am   Total Billable Time: 55 min 1:1 (TE ONLY)  Total Treatment Time: 60 min     SUBJECTIVE     Pt reports biked here this morning without significant increase in pain, only hurts to bike if in high gear. Continues to feel knee go out and has to pop it back into place    He was partially compliant with home exercise program.  Response to previous treatment: well /c no c/o of excess discomfort   Functional change: none yet    Occupation: disabled     Pain: 3/10  Location: medial aspect of knee, deep in joint R>L      OBJECTIVE     Objective Measures updated at progress report unless specified.         CMS Impairment/Limitation/Restriction for FOTO Knee Survey     Therapist reviewed FOTO scores for Nilay Ramos on 1/17/24.   FOTO documents entered into [employer] - see Media section.                        TREATMENT     Pemberton received the treatments listed below:      received therapeutic exercises to develop strength and ROM for 55  minutes including:    No warmup due to pt biking to treatment    QS x 10x3"  SAQ x15x3" ea  5#  SLR x10 ea /c 3# ankle weight  Abd SLR x12 ea   +VMO LAQ 3# bilaterally 2x10x3"  SL Leg press 70# 2 x 15 ea  3/10 RPE  Knee extension medx 60# 2x10  4/10 RPE  Hamstring curl medx 90# x 20  2/10 " "RPE   +Sidestepping GTB 2x10 steps   +Monster walks GTB 2x10 steps   Chair tap squats 1 airex 2x10 cueing for proper squat mechanics  SLS on airex 3x30 ea  Lateral step ups 2x10x3" ea  Heel raises 2x15      NP:   Bridges x 10, /c hip AB at apex x 10   Prone knee flexion /c IR/ER hip at 90 deg flexion x 10 each side   SL SLR x10 ea   Hooklying hip adduction with pilates ring 2x10x3"  SL clams GTB 2x10 GTB ea   Prone hip extension 2x10 ea   Bridges with GTB 2x10  Hip hinge drill with dowel x10  Cone weaving x 10 laps 6 cones /c focus on SL dynamic motion/direction change   Airex pad between TM - intermittent hand taps throughout     NBOS EO static stance 30 sec   SLS to failure x 5 each side    NBOS EC static stance 30 sec x 2    Step up to SLS/march x 10 each side     Nilay participated in dynamic functional therapeutic activities to improve functional performance for 00  minutes, including:        Patient receive ice packs for 5 min to B knee in long sitting - unbilled     PATIENT EDUCATION AND HOME EXERCISES     Home Exercises Provided and Patient Education Provided     Education provided:   - PT role and POC  - HEP    Written Home Exercises Provided: Patient instructed to cont prior HEP. Exercises were reviewed and Nilay was able to demonstrate them prior to the end of the session.  Nilay demonstrated good  understanding of the education provided. See EMR under Patient Instructions for exercises provided during therapy sessions    ASSESSMENT     Nilay presents today with continued complaints of knee pain and frequent subluxations. No improvement in FOTO score seen today. Progressed dynamic knee stability, strengthening, and balance exercises with good tolerance and no adverse effects. Continue to progress as tolerated.    Nilay Is progressing well towards his goals.   Pt prognosis is Good.     Pt will continue to benefit from skilled outpatient physical therapy to address the deficits listed in the problem list box on " initial evaluation, provide pt/family education and to maximize pt's level of independence in the home and community environment.     Pt's spiritual, cultural and educational needs considered and pt agreeable to plan of care and goals.     Anticipated barriers to physical therapy: none     GOALS:      Short Term Goals: 4 weeks  - Report decreased in pain at worse less than  <   / =  2  /10  to increase tolerance for functional mobility. Appropriate and ongoing  - Increased BLE MMT 1/2 grade to increase tolerance for ADL and work activities. Appropriate and ongoing  - Pt to tolerate HEP to improve ROM and independence with ADL's. Appropriate and ongoing     Long Term Goals: 8 weeks  - Report decreased in pain at worse less than  <   / =  0  /10  to increase tolerance for functional mobility. Appropriate and ongoing  - Pt to improve range of motion by 75% to allow for improved functional mobility to allow for improvement in IADLs. Appropriate and ongoing  - Increased BLE MMT 1 grade to increase tolerance for ADL and work activities. Appropriate and ongoing  - Pt will report 68% on FOTO knee survey  to demonstrate increase in LE function with every day tasks. Appropriate and ongoing  - Pt to be independent with HEP to improve ROM and independence with ADL's. Appropriate and ongoing    PLAN     Continue PT per RICARDO Griffin, PT   1/17/2024

## 2024-01-18 DIAGNOSIS — M54.2 CERVICALGIA: ICD-10-CM

## 2024-01-18 DIAGNOSIS — M54.12 CERVICAL RADICULOPATHY: ICD-10-CM

## 2024-01-19 RX ORDER — GABAPENTIN 300 MG/1
300 CAPSULE ORAL 3 TIMES DAILY
Qty: 90 CAPSULE | Refills: 3 | Status: SHIPPED | OUTPATIENT
Start: 2024-01-19 | End: 2024-01-19

## 2024-01-19 RX ORDER — GABAPENTIN 300 MG/1
300 CAPSULE ORAL 3 TIMES DAILY
Qty: 90 CAPSULE | Refills: 3 | Status: SHIPPED | OUTPATIENT
Start: 2024-01-19 | End: 2025-01-18

## 2024-01-23 ENCOUNTER — CLINICAL SUPPORT (OUTPATIENT)
Dept: REHABILITATION | Facility: OTHER | Age: 24
End: 2024-01-23
Payer: MEDICARE

## 2024-01-23 DIAGNOSIS — R29.898 DECREASED STRENGTH INVOLVING KNEE JOINT: Primary | ICD-10-CM

## 2024-01-23 PROCEDURE — 97110 THERAPEUTIC EXERCISES: CPT | Mod: CQ

## 2024-01-23 NOTE — PROGRESS NOTES
"[facility]   Physical Therapy Treatment Note     Name: Nilay Ramos  Clinic Number: [id number]    Therapy Diagnosis:   Encounter Diagnosis   Name Primary?    Decreased strength involving knee joint Yes         Physician: Makenna Sanford PA-C    Visit Date: 1/23/2024    Physician orders: PT Eval and Treat   Medical diagnosis from referral: M25.361 (ICD-10-CM) - Patellar instability of right knee   Evaluation date: 12/8/2023  Authorization period expiration: 10/30/24  Plan of care expiration: 3/8/2024  Reassessment due: 02/11/24   Visit # / visits authorized: 7/20    FOTO: 2/ 3  PTA Visit #: 0/5     Precautions: standard, past lumbar surgery    Time In: 2:00 pm   Time Out: 3:00 pm   Total Billable Time: 55 min 1:1 (TE ONLY)  Total Treatment Time: 60 min   SUBJECTIVE   Pt reports less pain in knee from starting Gabapentin again. Still feels like pops out of place with certain movements such as pivoting and kneeling.      He was partially compliant with home exercise program.  Response to previous treatment: well /c no c/o of excess discomfort   Functional change: none yet    Occupation: disabled     Pain: 3/10  Location: medial aspect of knee, deep in joint R>L    OBJECTIVE     Objective Measures updated at progress report unless specified.         CMS Impairment/Limitation/Restriction for FOTO Knee Survey     Therapist reviewed FOTO scores for Nilay Ramos on 1/17/24.   FOTO documents entered into [employer] - see Media section.                        TREATMENT     Nilay received the treatments listed below:      received therapeutic exercises to develop strength and ROM for 55 minutes including:    No warmup due to pt biking to treatment    QS x 10x3"  SAQ x15x3" ea  5#  SAQ>SLR x10 ea /c 3# ankle weight  Abd SLR x12 ea   VMO LAQ 3# bilaterally 2x10x3"  SL Leg press 70# 2 x 15 ea  3/10 RPE  Knee extension medx 62# 18 reps 4/10 RPE  Hamstring curl medx 90# x20  2/10 RPE   +Step up w/opposite " "knee drive 6in + airex  +Heel taps from 6in x10 (max cues for form and less weight when tapping)  Sidestepping GTB 2x10 steps   Monster walks GTB 2x10 steps   Chair tap squats 1 airex 2x10 cueing for proper squat mechanics  SLS on airex 2x30 ea  Lateral step ups 6in  2x10x3" ea  Heel raises 2x15      NP:   Bridges x 10, /c hip AB at apex x 10   Prone knee flexion /c IR/ER hip at 90 deg flexion x 10 each side   SL SLR x10 ea   Hooklying hip adduction with pilates ring 2x10x3"  SL clams GTB 2x10 GTB ea   Prone hip extension 2x10 ea   Bridges with GTB 2x10  Hip hinge drill with dowel x10  Cone weaving x 10 laps 6 cones /c focus on SL dynamic motion/direction change   Airex pad between TM - intermittent hand taps throughout     NBOS EO static stance 30 sec   SLS to failure x 5 each side    NBOS EC static stance 30 sec x 2    Step up to SLS/march x 10 each side     Nilay participated in dynamic functional therapeutic activities to improve functional performance for 00  minutes, including:        Patient receive ice packs for 5 min to B knee in long sitting - unbilled   PATIENT EDUCATION AND HOME EXERCISES     Home Exercises Provided and Patient Education Provided     Education provided:   - PT role and POC  - HEP    Written Home Exercises Provided: Patient instructed to cont prior HEP. Exercises were reviewed and Nilay was able to demonstrate them prior to the end of the session.  Nilay demonstrated good  understanding of the education provided. See EMR under Patient Instructions for exercises provided during therapy sessions    ASSESSMENT   Nilay presents today with minimal pain this visit. Continues to be challenged with current strengthening/stabilization exercises. Encouraged patient to perform HEP more consistently to obtain optimal therapeutic gains from PT.      Continue to progress as tolerated.    Nilay Is progressing well towards his goals.   Pt prognosis is Good.     Pt will continue to benefit from skilled " outpatient physical therapy to address the deficits listed in the problem list box on initial evaluation, provide pt/family education and to maximize pt's level of independence in the home and community environment.     Pt's spiritual, cultural and educational needs considered and pt agreeable to plan of care and goals.     Anticipated barriers to physical therapy: none     GOALS:      Short Term Goals: 4 weeks  - Report decreased in pain at worse less than  <   / =  2  /10  to increase tolerance for functional mobility. Appropriate and ongoing  - Increased BLE MMT 1/2 grade to increase tolerance for ADL and work activities. Appropriate and ongoing  - Pt to tolerate HEP to improve ROM and independence with ADL's. Appropriate and ongoing     Long Term Goals: 8 weeks  - Report decreased in pain at worse less than  <   / =  0  /10  to increase tolerance for functional mobility. Appropriate and ongoing  - Pt to improve range of motion by 75% to allow for improved functional mobility to allow for improvement in IADLs. Appropriate and ongoing  - Increased BLE MMT 1 grade to increase tolerance for ADL and work activities. Appropriate and ongoing  - Pt will report 68% on FOTO knee survey  to demonstrate increase in LE function with every day tasks. Appropriate and ongoing  - Pt to be independent with HEP to improve ROM and independence with ADL's. Appropriate and ongoing    PLAN     Continue PT per RICARDO Huff, PTA   1/23/2024

## 2024-02-04 ENCOUNTER — OFFICE VISIT (OUTPATIENT)
Dept: URGENT CARE | Facility: CLINIC | Age: 24
End: 2024-02-04
Payer: MEDICARE

## 2024-02-04 VITALS
TEMPERATURE: 98 F | OXYGEN SATURATION: 96 % | HEIGHT: 65 IN | WEIGHT: 229 LBS | SYSTOLIC BLOOD PRESSURE: 124 MMHG | RESPIRATION RATE: 16 BRPM | HEART RATE: 87 BPM | BODY MASS INDEX: 38.15 KG/M2 | DIASTOLIC BLOOD PRESSURE: 80 MMHG

## 2024-02-04 DIAGNOSIS — A54.9 GONORRHEA: ICD-10-CM

## 2024-02-04 DIAGNOSIS — Z11.3 SCREEN FOR STD (SEXUALLY TRANSMITTED DISEASE): Primary | ICD-10-CM

## 2024-02-04 LAB
BILIRUB UR QL STRIP: NEGATIVE
GLUCOSE UR QL STRIP: NEGATIVE
KETONES UR QL STRIP: NEGATIVE
LEUKOCYTE ESTERASE UR QL STRIP: POSITIVE
PH, POC UA: 7 (ref 5–8)
POC BLOOD, URINE: NEGATIVE
POC NITRATES, URINE: NEGATIVE
PROT UR QL STRIP: NEGATIVE
SP GR UR STRIP: 1.01 (ref 1–1.03)
UROBILINOGEN UR STRIP-ACNC: NORMAL (ref 0.1–1.1)

## 2024-02-04 PROCEDURE — 81003 URINALYSIS AUTO W/O SCOPE: CPT | Mod: QW,S$GLB,, | Performed by: FAMILY MEDICINE

## 2024-02-04 PROCEDURE — 87491 CHLMYD TRACH DNA AMP PROBE: CPT | Performed by: FAMILY MEDICINE

## 2024-02-04 PROCEDURE — 87086 URINE CULTURE/COLONY COUNT: CPT | Performed by: FAMILY MEDICINE

## 2024-02-04 PROCEDURE — 87389 HIV-1 AG W/HIV-1&-2 AB AG IA: CPT | Performed by: FAMILY MEDICINE

## 2024-02-04 PROCEDURE — 87186 SC STD MICRODIL/AGAR DIL: CPT | Performed by: FAMILY MEDICINE

## 2024-02-04 PROCEDURE — 81514 NFCT DS BV&VAGINITIS DNA ALG: CPT | Performed by: FAMILY MEDICINE

## 2024-02-04 PROCEDURE — 86592 SYPHILIS TEST NON-TREP QUAL: CPT | Performed by: FAMILY MEDICINE

## 2024-02-04 PROCEDURE — 99214 OFFICE O/P EST MOD 30 MIN: CPT | Mod: 25,S$GLB,, | Performed by: FAMILY MEDICINE

## 2024-02-04 PROCEDURE — 87077 CULTURE AEROBIC IDENTIFY: CPT | Performed by: FAMILY MEDICINE

## 2024-02-04 PROCEDURE — 87088 URINE BACTERIA CULTURE: CPT | Performed by: FAMILY MEDICINE

## 2024-02-04 PROCEDURE — 80074 ACUTE HEPATITIS PANEL: CPT | Performed by: FAMILY MEDICINE

## 2024-02-04 PROCEDURE — 36415 COLL VENOUS BLD VENIPUNCTURE: CPT | Performed by: FAMILY MEDICINE

## 2024-02-04 PROCEDURE — 96372 THER/PROPH/DIAG INJ SC/IM: CPT | Mod: S$GLB,,, | Performed by: FAMILY MEDICINE

## 2024-02-04 RX ORDER — CEFTRIAXONE 1 G/1
1 INJECTION, POWDER, FOR SOLUTION INTRAMUSCULAR; INTRAVENOUS
Status: COMPLETED | OUTPATIENT
Start: 2024-02-04 | End: 2024-02-04

## 2024-02-04 RX ORDER — CABOTEGRAVIR 600 MG/3ML
SUSPENSION,EXTENDED RELEASE VIAL (ML) INTRAMUSCULAR
COMMUNITY

## 2024-02-04 RX ORDER — METHOCARBAMOL 750 MG/1
1 TABLET, FILM COATED ORAL
COMMUNITY

## 2024-02-04 RX ORDER — OMEPRAZOLE 40 MG/1
40 CAPSULE, DELAYED RELEASE ORAL EVERY MORNING
COMMUNITY

## 2024-02-04 RX ADMIN — CEFTRIAXONE 1 G: 1 INJECTION, POWDER, FOR SOLUTION INTRAMUSCULAR; INTRAVENOUS at 03:02

## 2024-02-04 NOTE — PROGRESS NOTES
"Subjective:      Patient ID: Nilay BUSBY Workman is a 23 y.o. adult.    Vitals:  height is 5' 5" (1.651 m) and weight is 103.9 kg (229 lb). His oral temperature is 98.4 °F (36.9 °C). His blood pressure is 124/80 and his pulse is 87. His respiration is 16 and oxygen saturation is 96%.     Chief Complaint: Exposure to STD    Pt states he was exposed to an STD. Pt states his partner was diagnosed with Gonorrhea. Pt states his symptoms started a few weeks ago. Pt states he have some burning sensation while urinating. Pt states he haven't taken any medication for his symptoms.     Exposure to STD  This is a new problem. The current episode started 1 to 4 weeks ago. The problem occurs rarely. The problem has been unchanged. Associated symptoms include nausea and urinary symptoms. Pertinent negatives include no abdominal pain, anorexia, arthralgias, change in bowel habit, chest pain, chills, congestion, coughing, diaphoresis, fatigue, fever, headaches, joint swelling, myalgias, neck pain, numbness, rash, sore throat, swollen glands, vertigo, visual change, vomiting or weakness. He has tried nothing for the symptoms.       Constitution: Negative for chills, sweating, fatigue and fever.   HENT:  Negative for congestion and sore throat.    Neck: Negative for neck pain.   Cardiovascular:  Negative for chest pain.   Respiratory:  Negative for cough.    Gastrointestinal:  Positive for nausea. Negative for abdominal pain and vomiting.   Musculoskeletal:  Negative for joint pain, joint swelling and muscle ache.   Skin:  Negative for rash.   Neurological:  Negative for history of vertigo, headaches and numbness.      Objective:     Physical Exam   Constitutional: He is oriented to person, place, and time.   HENT:   Head: Normocephalic.   Ears:   Right Ear: External ear normal.   Left Ear: External ear normal.   Nose: Nose normal.   Mouth/Throat: Mucous membranes are moist.   Eyes: Conjunctivae are normal.   Cardiovascular: Normal rate. "   Pulmonary/Chest: Effort normal.   Musculoskeletal: Normal range of motion.         General: Normal range of motion.   Neurological: He is alert and oriented to person, place, and time.   Skin: Skin is dry.   Psychiatric: His behavior is normal.       Assessment:     1. Screen for STD (sexually transmitted disease)    2. Gonorrhea      Results for orders placed or performed in visit on 02/04/24   POCT Urinalysis, Dipstick, Automated, W/O Scope   Result Value Ref Range    POC Blood, Urine Negative Negative    POC Bilirubin, Urine Negative Negative    POC Urobilinogen, Urine normal 0.1 - 1.1    POC Ketones, Urine Negative Negative    POC Protein, Urine Negative Negative    POC Nitrates, Urine Negative Negative    POC Glucose, Urine Negative Negative    pH, UA 7.0 5 - 8    POC Specific Gravity, Urine 1.015 1.003 - 1.029    POC Leukocytes, Urine Positive (A) Negative       Plan:       Screen for STD (sexually transmitted disease)  -     POCT Urinalysis, Dipstick, Automated, W/O Scope  -     C. trachomatis/N. gonorrhoeae by AMP DNA [facility]; Vagina  -     VAGINOSIS SCREEN BY DNA PROBE  -     HIV 1/2 Ag/Ab (4th Gen)  -     RPR  -     HEPATITIS PANEL, ACUTE  -     cefTRIAXone injection 1 g  -     CULTURE, URINE    Gonorrhea  -     cefTRIAXone injection 1 g  -     CULTURE, URINE        Trans and polyamorous, exposure via one of these partners  Still has a vagina organ, did get hysterectomy  Complaints Pain with urination and itching  UA has WBCs, so will send for cutlure given symptoms. [place]aley due to gonorrhea. Will treat as above. Has f/u with pcp on Friday but wanted to start testing today so results would be ready by his pcp visit Friday. I did advise however given exposure and symptoms to treat for gonorrhea since the rocephin would also cover uti sx. Pt agreed.

## 2024-02-05 LAB
HAV IGM SERPL QL IA: NORMAL
HBV CORE IGM SERPL QL IA: NORMAL
HBV SURFACE AG SERPL QL IA: NORMAL
HCV AB SERPL QL IA: NORMAL
HIV 1+2 AB+HIV1 P24 AG SERPL QL IA: NORMAL

## 2024-02-06 LAB
BACTERIAL VAGINOSIS DNA: NEGATIVE
CANDIDA GLABRATA DNA: NEGATIVE
CANDIDA KRUSEI DNA: NEGATIVE
CANDIDA RRNA VAG QL PROBE: NEGATIVE
RPR SER QL: NORMAL
T VAGINALIS RRNA GENITAL QL PROBE: NEGATIVE

## 2024-02-07 LAB — BACTERIA UR CULT: ABNORMAL

## 2024-02-08 ENCOUNTER — TELEPHONE (OUTPATIENT)
Dept: URGENT CARE | Facility: CLINIC | Age: 24
End: 2024-02-08
Payer: MEDICARE

## 2024-02-08 LAB
C TRACH DNA SPEC QL NAA+PROBE: NOT DETECTED
N GONORRHOEA DNA SPEC QL NAA+PROBE: NOT DETECTED

## 2024-02-09 ENCOUNTER — TELEPHONE (OUTPATIENT)
Dept: URGENT CARE | Facility: CLINIC | Age: 24
End: 2024-02-09
Payer: MEDICARE

## 2024-02-09 RX ORDER — NITROFURANTOIN 25; 75 MG/1; MG/1
100 CAPSULE ORAL 2 TIMES DAILY
Qty: 10 CAPSULE | Refills: 0 | Status: SHIPPED | OUTPATIENT
Start: 2024-02-09 | End: 2024-02-14

## 2024-02-09 NOTE — TELEPHONE ENCOUNTER
No answer. Left message on answering machine  I see she has viewed her results on the portal. She did have a UTI and I will send antibiotic to the pharmacy for her.

## 2024-02-09 NOTE — TELEPHONE ENCOUNTER
Attempted to call for f/u and positive urine culture for E coli.  Patient has not been treated for UTI.  Left a VM to call back.

## 2024-02-12 ENCOUNTER — CLINICAL SUPPORT (OUTPATIENT)
Dept: REHABILITATION | Facility: OTHER | Age: 24
End: 2024-02-12
Payer: MEDICARE

## 2024-02-12 DIAGNOSIS — R29.898 DECREASED STRENGTH INVOLVING KNEE JOINT: Primary | ICD-10-CM

## 2024-02-12 PROCEDURE — 97110 THERAPEUTIC EXERCISES: CPT | Mod: CQ

## 2024-02-12 NOTE — PROGRESS NOTES
[facility]   Physical Therapy Treatment Note     Name: Nilay Ramos  Clinic Number: [id number]    Therapy Diagnosis:   Encounter Diagnosis   Name Primary?    Decreased strength involving knee joint Yes         Physician: Makenna Sanford PA-C    Visit Date: 2/12/2024    Physician orders: PT Eval and Treat   Medical diagnosis from referral: M25.361 (ICD-10-CM) - Patellar instability of right knee   Evaluation date: 12/8/2023  Authorization period expiration: 10/30/24  Plan of care expiration: 3/8/2024  Reassessment due: 02/11/24   Visit # / visits authorized: 8/20    FOTO: 2/ 3  PTA Visit #: 1/5     Precautions: standard, past lumbar surgery    Time In: 10:54 am   Time Out: 1154 am   Total Billable Time: 60 min 1:1 (TE ONLY)  Total Treatment Time: 60min     SUBJECTIVE     Pt reports She is in pain. It's been about a 7/10. Her R knee dislocated last night and her friend was able to help her get it back in. The pain has been so bad she could not leave the house. She has been doing a lot more lately like moving. She does the exercises as much as she can in terms of her HEP. She does the quad sets and the SLR's. Pt has an electrical bike but she has had to manually use it lately and that has been increasing the pain.     He was partially compliant with home exercise program.  Response to previous treatment: well /c no c/o of excess discomfort   Functional change: none yet    Occupation: disabled     Pain: 5/10  Location: medial aspect of knee, deep in joint R>L      OBJECTIVE     Objective Measures updated at progress report unless specified.         CMS Impairment/Limitation/Restriction for FOTO Knee Survey     Therapist reviewed FOTO scores for Nilay Ramos on 1/17/24.   FOTO documents entered into [employer] - see Media section.                        TREATMENT     Pemberton received the treatments listed below:      received therapeutic exercises to develop strength and ROM for 55  minutes  "including:    Recumbent bike x 5'    QS x 20x3"  SAQ x20x5" ea  5#  +Supine VMO ball squeeze with SAQ x 10  SLR 2 x10 ea /c 3# ankle weight  Abd SLR 2# x 10 ea  SLR add 2# x 10 ea  +VMO LAQ 3# bilaterally 2x10x3"  +Bridge c/ hip add 2 x 10   SL Leg press 76# 2 x 15 ea  3/10 RPE  Knee extension medx 60# 2x10  4/10 RPE  Hamstring curl medx 94# x 20  2/10 RPE   Sidestepping GTB 3 laps   Monster walks GTB 3 laps   Chair tap squats 1 airex 2x10 + GTB   SLS on airex 3x30 ea  Lateral step ups 2x10x3" ea  Heel raises 2x15      NP:   Bridges x 10, /c hip AB at apex x 10   Prone knee flexion /c IR/ER hip at 90 deg flexion x 10 each side   SL SLR x10 ea   Hooklying hip adduction with pilates ring 2x10x3"  SL clams GTB 2x10 GTB ea   Prone hip extension 2x10 ea   Bridges with GTB 2x10  Hip hinge drill with dowel x10  Cone weaving x 10 laps 6 cones /c focus on SL dynamic motion/direction change   Airex pad between TM - intermittent hand taps throughout     NBOS EO static stance 30 sec   SLS to failure x 5 each side    NBOS EC static stance 30 sec x 2    Step up to SLS/march x 10 each side     Nilay participated in dynamic functional therapeutic activities to improve functional performance for 00  minutes, including:        Patient receive ice packs for 5 min to B knee in long sitting - unbilled     PATIENT EDUCATION AND HOME EXERCISES     Home Exercises Provided and Patient Education Provided     Education provided:   - PT role and POC  - HEP    Written Home Exercises Provided: Patient instructed to cont prior HEP. Exercises were reviewed and Pemberton was able to demonstrate them prior to the end of the session.  Pemberton demonstrated good  understanding of the education provided. See EMR under Patient Instructions for exercises provided during therapy sessions    ASSESSMENT     Nilay presents today with continued complaints of knee pain and recent subluxation. Discussed with pt the importance of attending therapy and performing HEP " regularly to optimize therapeutic outcomes. Pt demos weakness in quads and gluts. Fasciculations present with quad exercises.     Pemberton Is progressing well towards his goals.   Pt prognosis is Good.     Pt will continue to benefit from skilled outpatient physical therapy to address the deficits listed in the problem list box on initial evaluation, provide pt/family education and to maximize pt's level of independence in the home and community environment.     Pt's spiritual, cultural and educational needs considered and pt agreeable to plan of care and goals.     Anticipated barriers to physical therapy: none     GOALS:      Short Term Goals: 4 weeks  - Report decreased in pain at worse less than  <   / =  2  /10  to increase tolerance for functional mobility. Appropriate and ongoing  - Increased BLE MMT 1/2 grade to increase tolerance for ADL and work activities. Appropriate and ongoing  - Pt to tolerate HEP to improve ROM and independence with ADL's. Appropriate and ongoing     Long Term Goals: 8 weeks  - Report decreased in pain at worse less than  <   / =  0  /10  to increase tolerance for functional mobility. Appropriate and ongoing  - Pt to improve range of motion by 75% to allow for improved functional mobility to allow for improvement in IADLs. Appropriate and ongoing  - Increased BLE MMT 1 grade to increase tolerance for ADL and work activities. Appropriate and ongoing  - Pt will report 68% on FOTO knee survey  to demonstrate increase in LE function with every day tasks. Appropriate and ongoing  - Pt to be independent with HEP to improve ROM and independence with ADL's. Appropriate and ongoing    PLAN     Continue PT per RICARDO Liu, PTA   2/12/2024

## 2024-02-15 ENCOUNTER — PATIENT MESSAGE (OUTPATIENT)
Dept: REHABILITATION | Facility: OTHER | Age: 24
End: 2024-02-15
Payer: MEDICARE

## 2024-06-19 ENCOUNTER — HOSPITAL ENCOUNTER (EMERGENCY)
Facility: HOSPITAL | Age: 24
Discharge: HOME | End: 2024-06-20
Attending: EMERGENCY MEDICINE
Payer: MEDICARE

## 2024-06-19 DIAGNOSIS — M79.609 PARESTHESIA AND PAIN OF LEFT EXTREMITY: Primary | ICD-10-CM

## 2024-06-19 DIAGNOSIS — R20.2 PARESTHESIA AND PAIN OF LEFT EXTREMITY: Primary | ICD-10-CM

## 2024-06-19 PROCEDURE — 99285 EMERGENCY DEPT VISIT HI MDM: CPT

## 2024-06-20 VITALS
BODY MASS INDEX: 38.12 KG/M2 | SYSTOLIC BLOOD PRESSURE: 110 MMHG | OXYGEN SATURATION: 98 % | WEIGHT: 229.06 LBS | DIASTOLIC BLOOD PRESSURE: 74 MMHG | RESPIRATION RATE: 18 BRPM | TEMPERATURE: 98 F | HEART RATE: 66 BPM

## 2024-06-20 PROCEDURE — 63600175 PHARM REV CODE 636 W HCPCS

## 2024-06-20 PROCEDURE — 25000003 PHARM REV CODE 250

## 2024-06-20 PROCEDURE — 96372 THER/PROPH/DIAG INJ SC/IM: CPT

## 2024-06-20 RX ORDER — GABAPENTIN 300 MG/1
300 CAPSULE ORAL
Status: COMPLETED | OUTPATIENT
Start: 2024-06-20 | End: 2024-06-20

## 2024-06-20 RX ORDER — KETOROLAC TROMETHAMINE 30 MG/ML
15 INJECTION, SOLUTION INTRAMUSCULAR; INTRAVENOUS
Status: COMPLETED | OUTPATIENT
Start: 2024-06-20 | End: 2024-06-20

## 2024-06-20 RX ORDER — ACETAMINOPHEN 500 MG
1000 TABLET ORAL
Status: COMPLETED | OUTPATIENT
Start: 2024-06-20 | End: 2024-06-20

## 2024-06-20 RX ORDER — LIDOCAINE 50 MG/G
1 PATCH TOPICAL
Status: DISCONTINUED | OUTPATIENT
Start: 2024-06-20 | End: 2024-06-20 | Stop reason: HOSPADM

## 2024-06-20 RX ADMIN — GABAPENTIN 300 MG: 300 CAPSULE ORAL at 12:06

## 2024-06-20 RX ADMIN — ACETAMINOPHEN 1000 MG: 500 TABLET ORAL at 12:06

## 2024-06-20 RX ADMIN — LIDOCAINE 5% 1 PATCH: 700 PATCH TOPICAL at 12:06

## 2024-06-20 RX ADMIN — KETOROLAC TROMETHAMINE 15 MG: 30 INJECTION, SOLUTION INTRAMUSCULAR; INTRAVENOUS at 12:06

## 2024-06-20 NOTE — ED TRIAGE NOTES
Nilay BUSBY Richard, a 24 y.o. adult presents to the ED w/ complaint of L thigh pain, Pt ambulatory in triage. Denies any recent trauma.     Triage note:  Chief Complaint   Patient presents with    Leg Pain     Pt arrives via EMS c/o left thigh pain. Pt ambulatory.     Review of patient's allergies indicates:   Allergen Reactions    Lactose Other (See Comments)     Past Medical History:   Diagnosis Date    Depression     Scoliosis     Spondylolysis     Urinary tract infection

## 2024-06-20 NOTE — PROVIDER PROGRESS NOTES - EMERGENCY DEPT.
Emergency Department Interval Progress Note    Nilay Ramos   24 y.o. adult   [id number]      6/19/2024       History     This patient was signed out to me by PIYUSH Huang at shift change.  The patient presented with intermittent numbness to the left thigh for months.  It began after undergoing revision of L3-L5 spinal fusion in August 2023.  He also complained of intermittent pain to the left thigh with worsening over the past few days.  He had decreased sensation to the anterior left thigh on exam.  He had no motor deficits to the extremity.  X-rays of the lumbar spine showed abnormal lucencies adjacent to hardware screws.    MRI of the lumbar spine was pending when I took over his care.    I reassessed the patient and he reaffirmed the above.  He has an initial appointment with an orthopedic spine surgeon next month.          Physical Examination     No distress.       Labs     None     Imaging     Imaging Results              MRI Lumbar Spine Without Contrast (Final result)  Result time 06/20/24 02:06:26      Final result by Ace Chaparro MD (06/20/24 02:06:26)                   Impression:      Operative changes in the lower lumbar spine with transitional lumbosacral anatomy as discussed above.  Pseudoarticulation of the left S1 transverse process with the sacrum is consistent with an anatomic variant that can be associated with lower back pain.    Evaluation of the L5-S1 neural foramina limited by susceptibility artifact.  Please note that suspected perihardware lucency identified on same day lumbar spine radiographs could be better evaluated with CT when clinically warranted.    Similar anterolisthesis of L5 with respect to S1 likely related to bilateral L5 pars defects.    Additional findings discussed in the body of the report.      Electronically signed by: Ace Chaparro MD  Date:    06/20/2024  Time:    02:06               Narrative:    EXAMINATION:  MRI LUMBAR SPINE WITHOUT  CONTRAST    CLINICAL HISTORY:  Low back pain, progressive neurologic deficit;    TECHNIQUE:  Multiplanar, multisequence MR images were acquired from the thoracolumbar junction to the sacrum without the administration of contrast.    COMPARISON:  Lumbar spine radiograph, 06/20/2024.  Lumbar spine MRI, 09/30/2019.    FINDINGS:  Alignment: Approximately 1 cm anterolisthesis of L5 with respect to S1, likely related to pars defects at L5.  Otherwise, grossly normal sagittal alignment.  Mild straightening of the normal lumbar lordosis.    Vertebrae: Six lumbar type vertebral bodies with probable transitional lumbosacral vertebral body at S1, and pseudoarticulation of the left S1 transverse process with the sacrum.  This is an anatomic variant which can be associated with lower back pain.  No acute fracture.  Evaluation of marrow signal in the lower lumbar spine is limited by susceptibility artifact from spine hardware.  There are postoperative changes of posterior instrumented fusion at L4-S1.  Disc spacer at L5-S1.  Please note that evaluation of perihardware lucency could be better evaluated with CT if clinically warranted.    Discs: Mild disc space height loss and disc desiccation at L3-L4.  Disc spacer at L5-S1.    Cord: Normal.  Conus terminates at L1-L2    Degenerative findings:    T12-L1: No significant disc bulge.  No significant central canal stenosis or neural foraminal narrowing.    L1-L2: No significant disc bulge.  No significant central canal stenosis or neural foraminal narrowing.    L2-L3: No significant disc bulge.  No significant central canal stenosis or neural foraminal narrowing.    L3-L4: Loss of normal disc space height and disc desiccation.  Small posterior disc bulge extending approximately 3 mm into the central canal.  No significant central canal stenosis.  Question minimal bilateral neural foraminal narrowing.    L4-L5: Small posterior disc bulge.  No significant central canal stenosis.   Suspect minimal left neural foraminal narrowing.    L5-S1: Anterolisthesis of L5 with respect to S1 related to bilateral pars defects.  Uncovering of the L5-S1 disc with disc spacer in place.  Evaluation of the neural foramina limited by susceptibility artifact.  No significant central canal stenosis.    Paraspinal muscles & soft tissues: Mild soft tissue edema and/or scarring in the posterior paraspinal soft tissues.                                       X-Ray Lumbar Spine Ap And Lateral (Final result)  Result time 06/20/24 00:46:30      Final result by Ace Chaparro MD (06/20/24 00:46:30)                   Impression:      No acute compression fracture deformity identified in the lumbar spine.    New perihardware lucency involving the L4 screws, more pronounced to the right of midline.  Suggest correlation for hardware loosening.  Consider spine surgery follow-up and/or further evaluation with CT as clinically warranted.    Operative changes in the lower lumbar spine with transitional lumbosacral anatomy as discussed above.      Electronically signed by: Ace Chaparro MD  Date:    06/20/2024  Time:    00:46               Narrative:    EXAMINATION:  XR LUMBAR SPINE AP AND LATERAL    CLINICAL HISTORY:  left thight pain;    TECHNIQUE:  AP, lateral and spot images were performed of the lumbar spine.    COMPARISON:  09/21/2023.    FINDINGS:  Six lumbar type vertebral bodies with probable transitional lumbosacral vertebral body at S1, and pseudoarticulation of the left S1 transverse process with the sacrum.  Similar sagittal curvature and alignment when compared with recent prior study, noting persistent mild anterolisthesis of L5 with respect to S1.  Probable bilateral pars defects at L5.  Vertebral body heights are relatively well maintained.  Similar operative changes of posterior instrumented fusion at L4-S1.  However, there is suspected new perihardware lucency involving the L4 screws, more pronounced to  the right of midline.  Disc spacer at L5-S1.  No acute displaced fracture identified.                                        ED Course     The patient received the following medications:  Medications   LIDOcaine 5 % patch 1 patch (1 patch Transdermal Patch Applied 6/20/24 0025)   acetaminophen tablet 1,000 mg (1,000 mg Oral Given 6/20/24 0024)   ketorolac injection 15 mg (15 mg Intramuscular Given 6/20/24 0025)   gabapentin capsule 300 mg (300 mg Oral Given 6/20/24 0024)       ED Course as of 06/20/24 0457   Thu Jun 20, 2024   0050 X-Ray Lumbar Spine Ap And Lateral  Impression:     No acute compression fracture deformity identified in the lumbar spine.     New perihardware lucency involving the L4 screws, more pronounced to the right of midline.  Suggest correlation for hardware loosening.  Consider spine surgery follow-up and/or further evaluation with CT as clinically warranted.     Operative changes in the lower lumbar spine with transitional lumbosacral anatomy as discussed above. [KB]   0051 Signed out to Dr. Ledbetter pending MRI [KB]      ED Course User Index  [KB] Melisa Martinez PA-C        Medical Decision Making     No MRI findings to explain the patient's symptoms. I instructed him to contact his spine surgeon to attempt moving his upcoming appointment to a closer date.          Diagnoses       ICD-10-CM ICD-9-CM   1. Paresthesia and pain of left extremity  M79.609 729.5    R20.2 782.0         Dispostion      ED Disposition Condition    Discharge Stable            ED Prescriptions    None         Follow-up Information       Follow up With Specialties Details Why Contact Info    Iman Garza, FNP Family Medicine  Schedule a close in-person or virtual ER follow-up visit with your primary care provider. [address]  [facility] [address]  [phone]      ER   Return to the ER if your condition worsens or you have any other concerns that you feel need immediate attention.

## 2024-06-20 NOTE — DISCHARGE INSTRUCTIONS
Contact your orthopedic spine specialist to discuss moving your upcoming appointment to a closer date.      We know that you have many choices and are honored that you chose us. We hope that we met or exceeded your expectations and goals for this visit and will keep the [facility] family in mind for your future needs and those of your family and friends.     - Dr. Ledbetter

## 2024-06-20 NOTE — ED PROVIDER NOTES
Encounter Date: 6/19/2024       History     Chief Complaint   Patient presents with    Leg Pain     Pt arrives via EMS c/o left thigh pain. Pt ambulatory.     24-year-old transgender male with history of spinal fusion and chronic low back pain presents to the ED regarding left anterior thigh pain and numbness.  Patient states he had spinal fusion revision L3-L5 in August 2023.  Since then he has had intermittent anterior left thigh pain and numbness.  Over the past few weeks it has gotten significantly worse and became unbearable today.  States it feels like a stabbing knife with deep pressure and cannot feel light touch to the top of his thigh.  He does admit to back pain though feels like his normal chronic back pain.  He has been taking NSAIDs and gabapentin with no relief.  He also took leftover narcotics from his surgery with no relief.  Denies urinary/bowel dysfunction, fever, weakness, or other complaints at this time.  No personal history of IV drug use or cancer.  Denies trauma or injury.    The history is provided by the patient and medical records.     Review of patient's allergies indicates:   Allergen Reactions    Lactose Other (See Comments)     Past Medical History:   Diagnosis Date    Depression     Scoliosis     Spondylolysis     Urinary tract infection      Past Surgical History:   Procedure Laterality Date    BACK SURGERY      FUSION OF SPINE WITH INSTRUMENTATION N/A 8/29/2023    Procedure: FUSION, SPINE, WITH INSTRUMENTATION- Depuy 5.5 revision L3-L5 ;  Surgeon: Vicente Min MD;  Location: [facility] OR [address] [place] [address];  Service: Orthopedics;  Laterality: N/A;    HYSTERECTOMY      TRANSFORAMINAL LUMBAR INTERBODY FUSION  07/14/2020    Procedure: FUSION, SPINE, LUMBAR, TLIF;  Surgeon: Vicente Min MD;  Location: [facility] OR [address] [place] [address];  Service: Orthopedics;;  L4-5    WISDOM TOOTH EXTRACTION       Family History   Problem Relation Name Age of Onset    Breast cancer Maternal Aunt      Ovarian cancer Maternal  Aunt      Congenital heart disease Maternal Uncle      Heart disease Maternal Grandfather      Colon cancer Neg Hx       Social History     Tobacco Use    Smoking status: Never    Smokeless tobacco: Never    Tobacco comments:     vapes with CBD   Substance Use Topics    Alcohol use: No    Drug use: Yes     Frequency: 7.0 times per week     Types: Marijuana     Review of Systems  See HPI  Physical Exam     Initial Vitals [06/19/24 2301]   BP Pulse Resp Temp SpO2   128/70 80 16 98.1 °F (36.7 °C) 100 %      MAP       --         Physical Exam    Vitals reviewed.  Constitutional: He appears well-developed and well-nourished. He is not diaphoretic. He is Obese . No distress.   HENT:   Head: Normocephalic and atraumatic.   Neck: Neck supple.   Cardiovascular:  Normal rate.           Pulmonary/Chest: No respiratory distress.   Musculoskeletal:      Cervical back: Neck supple.      Lumbar back: No swelling, tenderness or bony tenderness. Normal range of motion.      Comments: No L-spine or paraspinal muscle tenderness.  No step-offs     Neurological: He is alert and oriented to person, place, and time. He has normal strength. A sensory deficit is present.   Light touch not intact to anterior thigh.  5/5 strength and normal ROM of back and lower extremities   Skin:   No rashes or skin changes   Psychiatric: He has a normal mood and affect.         ED Course   Procedures  Labs Reviewed - No data to display       Imaging Results              MRI Lumbar Spine Without Contrast (Final result)  Result time 06/20/24 02:06:26      Final result by Ace Chaparro MD (06/20/24 02:06:26)                   Impression:      Operative changes in the lower lumbar spine with transitional lumbosacral anatomy as discussed above.  Pseudoarticulation of the left S1 transverse process with the sacrum is consistent with an anatomic variant that can be associated with lower back pain.    Evaluation of the L5-S1 neural foramina limited by  susceptibility artifact.  Please note that suspected perihardware lucency identified on same day lumbar spine radiographs could be better evaluated with CT when clinically warranted.    Similar anterolisthesis of L5 with respect to S1 likely related to bilateral L5 pars defects.    Additional findings discussed in the body of the report.      Electronically signed by: Ace Chaparro MD  Date:    06/20/2024  Time:    02:06               Narrative:    EXAMINATION:  MRI LUMBAR SPINE WITHOUT CONTRAST    CLINICAL HISTORY:  Low back pain, progressive neurologic deficit;    TECHNIQUE:  Multiplanar, multisequence MR images were acquired from the thoracolumbar junction to the sacrum without the administration of contrast.    COMPARISON:  Lumbar spine radiograph, 06/20/2024.  Lumbar spine MRI, 09/30/2019.    FINDINGS:  Alignment: Approximately 1 cm anterolisthesis of L5 with respect to S1, likely related to pars defects at L5.  Otherwise, grossly normal sagittal alignment.  Mild straightening of the normal lumbar lordosis.    Vertebrae: Six lumbar type vertebral bodies with probable transitional lumbosacral vertebral body at S1, and pseudoarticulation of the left S1 transverse process with the sacrum.  This is an anatomic variant which can be associated with lower back pain.  No acute fracture.  Evaluation of marrow signal in the lower lumbar spine is limited by susceptibility artifact from spine hardware.  There are postoperative changes of posterior instrumented fusion at L4-S1.  Disc spacer at L5-S1.  Please note that evaluation of perihardware lucency could be better evaluated with CT if clinically warranted.    Discs: Mild disc space height loss and disc desiccation at L3-L4.  Disc spacer at L5-S1.    Cord: Normal.  Conus terminates at L1-L2    Degenerative findings:    T12-L1: No significant disc bulge.  No significant central canal stenosis or neural foraminal narrowing.    L1-L2: No significant disc bulge.  No  significant central canal stenosis or neural foraminal narrowing.    L2-L3: No significant disc bulge.  No significant central canal stenosis or neural foraminal narrowing.    L3-L4: Loss of normal disc space height and disc desiccation.  Small posterior disc bulge extending approximately 3 mm into the central canal.  No significant central canal stenosis.  Question minimal bilateral neural foraminal narrowing.    L4-L5: Small posterior disc bulge.  No significant central canal stenosis.  Suspect minimal left neural foraminal narrowing.    L5-S1: Anterolisthesis of L5 with respect to S1 related to bilateral pars defects.  Uncovering of the L5-S1 disc with disc spacer in place.  Evaluation of the neural foramina limited by susceptibility artifact.  No significant central canal stenosis.    Paraspinal muscles & soft tissues: Mild soft tissue edema and/or scarring in the posterior paraspinal soft tissues.                                       X-Ray Lumbar Spine Ap And Lateral (Final result)  Result time 06/20/24 00:46:30      Final result by Ace Chaparro MD (06/20/24 00:46:30)                   Impression:      No acute compression fracture deformity identified in the lumbar spine.    New perihardware lucency involving the L4 screws, more pronounced to the right of midline.  Suggest correlation for hardware loosening.  Consider spine surgery follow-up and/or further evaluation with CT as clinically warranted.    Operative changes in the lower lumbar spine with transitional lumbosacral anatomy as discussed above.      Electronically signed by: Ace Chaparro MD  Date:    06/20/2024  Time:    00:46               Narrative:    EXAMINATION:  XR LUMBAR SPINE AP AND LATERAL    CLINICAL HISTORY:  left thight pain;    TECHNIQUE:  AP, lateral and spot images were performed of the lumbar spine.    COMPARISON:  09/21/2023.    FINDINGS:  Six lumbar type vertebral bodies with probable transitional lumbosacral vertebral body  at S1, and pseudoarticulation of the left S1 transverse process with the sacrum.  Similar sagittal curvature and alignment when compared with recent prior study, noting persistent mild anterolisthesis of L5 with respect to S1.  Probable bilateral pars defects at L5.  Vertebral body heights are relatively well maintained.  Similar operative changes of posterior instrumented fusion at L4-S1.  However, there is suspected new perihardware lucency involving the L4 screws, more pronounced to the right of midline.  Disc spacer at L5-S1.  No acute displaced fracture identified.                                       Medications   acetaminophen tablet 1,000 mg (1,000 mg Oral Given 6/20/24 0024)   ketorolac injection 15 mg (15 mg Intramuscular Given 6/20/24 0025)   gabapentin capsule 300 mg (300 mg Oral Given 6/20/24 0024)     Medical Decision Making  Amount and/or Complexity of Data Reviewed  Radiology: ordered. Decision-making details documented in ED Course.    Risk  OTC drugs.  Prescription drug management.         APC / Resident Notes:   Emergent evaluation of 24-year-old presenting with left anterior thigh pain and numbness.  Vitals WNL.  Clinically well-appearing, in no acute distress.  See physical exam findings above. Will obtain x-ray to assess for any hardware abnormality. Will also obtain MRI given new persistent sensory deficit.     My differential diagnoses include but are not limited to:   Hardware malalignment, fracture, spinal stenosis, DDD, lower suspicion for diskitis given afebrile with no history of IV drug use, consider cauda equina syndrome though unlikely given urinary/bowel function intact and no saddle anesthesia    See ED course.  I have reviewed the patient's records and discussed with my supervising physician.        ED Course as of 06/20/24 1505   Thu Jun 20, 2024   0050 X-Ray Lumbar Spine Ap And Lateral  Impression:     No acute compression fracture deformity identified in the lumbar spine.      New perihardware lucency involving the L4 screws, more pronounced to the right of midline.  Suggest correlation for hardware loosening.  Consider spine surgery follow-up and/or further evaluation with CT as clinically warranted.     Operative changes in the lower lumbar spine with transitional lumbosacral anatomy as discussed above. [KB]   0053 Signed out to Dr. Ledbetter pending MRI [KB]      ED Course User Index  [KB] Melisa Martinez PA-C                           Clinical Impression:  Final diagnoses:  [M79.609, R20.2] Paresthesia and pain of left extremity (Primary)          ED Disposition Condition    Discharge Stable          ED Prescriptions    None       Follow-up Information       Follow up With Specialties Details Why Contact Info    Iman Garza, FNP Family Medicine  Schedule a close in-person or virtual ER follow-up visit with your primary care provider. [address]  [facility] [address]  [phone]      ER   Return to the ER if your condition worsens or you have any other concerns that you feel need immediate attention.              Melisa Martinez PA-C  06/20/24 7531

## 2024-09-06 ENCOUNTER — ON-DEMAND VIRTUAL (OUTPATIENT)
Dept: URGENT CARE | Facility: CLINIC | Age: 24
End: 2024-09-06
Payer: MEDICARE

## 2024-09-06 DIAGNOSIS — M54.50 LOW BACK PAIN, UNSPECIFIED BACK PAIN LATERALITY, UNSPECIFIED CHRONICITY, UNSPECIFIED WHETHER SCIATICA PRESENT: Primary | ICD-10-CM

## 2024-09-06 DIAGNOSIS — R05.9 COUGH, UNSPECIFIED TYPE: ICD-10-CM

## 2024-09-06 DIAGNOSIS — J02.9 SORE THROAT: Primary | ICD-10-CM

## 2024-09-06 DIAGNOSIS — R09.81 NASAL SINUS CONGESTION: ICD-10-CM

## 2024-09-07 ENCOUNTER — OFFICE VISIT (OUTPATIENT)
Dept: URGENT CARE | Facility: CLINIC | Age: 24
End: 2024-09-07
Payer: MEDICARE

## 2024-09-07 VITALS
BODY MASS INDEX: 38.16 KG/M2 | SYSTOLIC BLOOD PRESSURE: 109 MMHG | HEART RATE: 69 BPM | WEIGHT: 229.06 LBS | TEMPERATURE: 99 F | RESPIRATION RATE: 18 BRPM | DIASTOLIC BLOOD PRESSURE: 78 MMHG | HEIGHT: 65 IN | OXYGEN SATURATION: 98 %

## 2024-09-07 DIAGNOSIS — B34.9 ACUTE VIRAL SYNDROME: Primary | ICD-10-CM

## 2024-09-07 DIAGNOSIS — J02.9 SORE THROAT: ICD-10-CM

## 2024-09-07 LAB
CTP QC/QA: YES
CTP QC/QA: YES
MOLECULAR STREP A: NEGATIVE
SARS-COV-2 AG RESP QL IA.RAPID: NEGATIVE

## 2024-09-07 PROCEDURE — 87651 STREP A DNA AMP PROBE: CPT | Mod: QW,S$GLB,,

## 2024-09-07 PROCEDURE — 99213 OFFICE O/P EST LOW 20 MIN: CPT | Mod: S$GLB,,,

## 2024-09-07 PROCEDURE — 87811 SARS-COV-2 COVID19 W/OPTIC: CPT | Mod: QW,S$GLB,,

## 2024-09-07 RX ORDER — DEXTROAMPHETAMINE SACCHARATE, AMPHETAMINE ASPARTATE MONOHYDRATE, DEXTROAMPHETAMINE SULFATE AND AMPHETAMINE SULFATE 2.5; 2.5; 2.5; 2.5 MG/1; MG/1; MG/1; MG/1
CAPSULE, EXTENDED RELEASE ORAL
COMMUNITY
Start: 2024-07-08

## 2024-09-07 RX ORDER — IPRATROPIUM BROMIDE 21 UG/1
2 SPRAY, METERED NASAL 3 TIMES DAILY PRN
Qty: 30 ML | Refills: 0 | Status: SHIPPED | OUTPATIENT
Start: 2024-09-07

## 2024-09-07 RX ORDER — BENZONATATE 200 MG/1
200 CAPSULE ORAL 3 TIMES DAILY PRN
Qty: 30 CAPSULE | Refills: 0 | Status: SHIPPED | OUTPATIENT
Start: 2024-09-07 | End: 2024-09-17

## 2024-09-07 NOTE — PATIENT INSTRUCTIONS
- Rest.    - Drink plenty of fluids. Increasing your fluid intake will help loosen up mucous.  - Viral upper respiratory infections typically run their course in 10-14 days.      - You can take over-the-counter claritin, zyrtec, allegra, OR xyzal as directed. These are antihistamines that can help with runny nose, nasal congestion, sneezing, and helps to dry up post-nasal drip, which usually causes sore throat and cough.     - You can take Delsym to help with cough.      - If you do NOT have high blood pressure, you may use a decongestant form (D)  of this medication (ie. Claritin- D, zyrtec-D, allegra-D) or if you do not take the D form, you can take sudafed (pseudoephedrine) over the counter, which is a decongestant. Do NOT take two decongestant (D) medications at the same time (such as mucinex-D and claritin-D or plain sudafed and claritin D). Dextromethorphan (DM) is a cough suppressant over the counter (ie. mucinex DM, robitussin, delsym; dayquil/nyquil has DM as well.)     - You can use Flonase (fluticasone) nasal spray as directed for sinus congestion and postnasal drip. This is a steroid nasal spray that works locally over time to decrease the inflammation in your nose/sinuses and help with allergic symptoms. This is not an quick- relief spray like afrin, but it works well if used daily.  Discontinue if you develop nose bleed  - Use nasal saline prior to Flonase.  - Use Ocean Spray Nasal Saline 1-3 puffs each nostril every 2-3 hours then blow out onto tissue. This is to irrigate the nasal passage way to clear the sinus openings. Use until sinus problem resolved.     - A Neti Pot with sterile saline can help break up nasal congestion and give relief.      - Chloraseptic throat spray can help numb the throat.      - Warm salt water gargles can help with sore throat.  - Warm tea with honey can help with sore throat and cough. Honey is a natural cough suppressant.     - Acetaminophen (tylenol) or Ibuprofen  (advil,motrin) as directed as needed for fever/pain. Avoid tylenol if you have a history of liver disease. Do not take ibuprofen if you have a history of GI bleeding, kidney disease, or if you take blood thinners.      - You must understand that you have received an Urgent Care treatment only and that you may be released before all of your medical problems are known or treated.   - You, the patient, will arrange for follow up care as instructed.   - If your condition worsens or fails to improve we recommend that you receive another evaluation at the ER immediately or contact your PCP to discuss your concerns or return here.   - Follow up with your PCP or specialty clinic as directed in the next 1-2 weeks if not improved or as needed.  You can call [phone] to schedule an appointment with the appropriate provider.    If your symptoms do not improve or worsen, go to the emergency room immediately.

## 2024-09-07 NOTE — PROGRESS NOTES
"Subjective:      Patient ID: Nilay BUSBY Workman is a 24 y.o. adult.    Vitals:  height is 5' 5" (1.651 m) and weight is 103.9 kg (229 lb 0.9 oz). His oral temperature is 98.5 °F (36.9 °C). His blood pressure is 109/78 and his pulse is 69. His respiration is 18 and oxygen saturation is 98%.     Chief Complaint: Cough    Pt presents cough (productive), sore throat (painful swallowing), and headaches. Sx began 2 days ago. Tx sx with tylenol, increased fluids without relief. 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 SOB described as feeling phlegm caught in throat when trying to cough, no SOB at rest. No chest pain, nausea, vomiting, diarrhea, recorded fever. +Sweats and fatigue.     Cough  Associated symptoms include headaches and a sore throat. Pertinent negatives include no ear congestion, ear pain, nasal congestion or rhinorrhea.     HENT:  Positive for sore throat. Negative for ear pain.    Respiratory:  Positive for cough.    Neurological:  Positive for headaches.      Objective:     Physical Exam   Constitutional: He is oriented to person, place, and time. He appears well-developed. He is cooperative.  Non-toxic appearance. He does not appear ill. No distress.   HENT:   Head: Normocephalic and atraumatic.   Ears:   Right Ear: Hearing, tympanic membrane, external ear and ear canal normal.   Left Ear: Hearing, tympanic membrane, external ear and ear canal normal.   Nose: Nose normal. No mucosal edema, rhinorrhea or nasal deformity. No epistaxis. Right sinus exhibits no maxillary sinus tenderness and no frontal sinus tenderness. Left sinus exhibits no maxillary sinus tenderness and no frontal sinus tenderness.   Mouth/Throat: Uvula is midline, oropharynx is clear and moist and mucous membranes are normal. No trismus in the jaw. Normal dentition. No uvula swelling. No oropharyngeal exudate, posterior oropharyngeal edema or posterior oropharyngeal erythema.   Eyes: Conjunctivae " and lids are normal. No scleral icterus.   Neck: Trachea normal and phonation normal. Neck supple. No edema present. No erythema present. No neck rigidity present.   Cardiovascular: Normal rate, regular rhythm, normal heart sounds and normal pulses.   Pulmonary/Chest: Effort normal and breath sounds normal. No respiratory distress. He has no decreased breath sounds. He has no rhonchi.   Abdominal: Normal appearance.   Musculoskeletal: Normal range of motion.         General: No deformity. Normal range of motion.   Neurological: He is alert and oriented to person, place, and time. He exhibits normal muscle tone. Coordination normal.   Skin: Skin is warm, dry, intact, not diaphoretic and not pale.   Psychiatric: His speech is normal and behavior is normal. Judgment and thought content normal.   Nursing note and vitals reviewed.    Results for orders placed or performed in visit on 09/07/24   SARS Coronavirus 2 Antigen, POCT Manual Read   Result Value Ref Range    SARS Coronavirus 2 Antigen Negative Negative     Acceptable Yes    POCT Strep A, Molecular   Result Value Ref Range    Molecular Strep A, POC Negative Negative     Acceptable Yes        Assessment:     1. Acute viral syndrome    2. Sore throat        Plan:       Acute viral syndrome  -     benzonatate (TESSALON) 200 MG capsule; Take 1 capsule (200 mg total) by mouth 3 (three) times daily as needed for Cough.  Dispense: 30 capsule; Refill: 0  -     ipratropium (ATROVENT) 21 mcg (0.03 %) nasal spray; 2 sprays by Each Nostril route 3 (three) times daily as needed for Rhinitis.  Dispense: 30 mL; Refill: 0    Sore throat  -     SARS Coronavirus 2 Antigen, POCT Manual Read  -     POCT Strep A, Molecular

## 2024-09-07 NOTE — PROGRESS NOTES
Subjective:      Patient ID: Nilay Ramos is a 24 y.o. adult.    Vitals:  vitals were not taken for this visit.     Chief Complaint: URI      Visit Type: TELE AUDIOVISUAL    Present with the patient at the time of consultation: TELEMED PRESENT WITH PATIENT: None        Past Medical History:   Diagnosis Date    Depression     Scoliosis     Spondylolysis     Urinary tract infection      Past Surgical History:   Procedure Laterality Date    BACK SURGERY      FUSION OF SPINE WITH INSTRUMENTATION N/A 2023    Procedure: FUSION, SPINE, WITH INSTRUMENTATION- Depuy 5.5 revision L3-L5 ;  Surgeon: Vicente Min MD;  Location: [facility] OR [address] [place] [address];  Service: Orthopedics;  Laterality: N/A;    HYSTERECTOMY      TRANSFORAMINAL LUMBAR INTERBODY FUSION  2020    Procedure: FUSION, SPINE, LUMBAR, TLIF;  Surgeon: Vicente Min MD;  Location: [facility] OR [address] [place] [address];  Service: Orthopedics;;  L4-5    WISDOM TOOTH EXTRACTION       Review of patient's allergies indicates:   Allergen Reactions    Lactose Other (See Comments)     Current Outpatient Medications on File Prior to Visit   Medication Sig Dispense Refill    APRETUDE 600 mg/3 mL (200 mg/mL) SpSR IM injection SMARTSI Syringe(s) IM      atomoxetine (STRATTERA) 60 MG capsule Take 60 mg by mouth every evening.      celecoxib (CELEBREX) 200 MG capsule Take 1 capsule (200 mg total) by mouth daily as needed for Pain. 30 capsule 1    famotidine (PEPCID) 40 MG tablet Take 40 mg by mouth once daily.      gabapentin (NEURONTIN) 300 MG capsule Take 1 capsule (300 mg total) by mouth 3 (three) times daily. 90 capsule 3    hyoscyamine (LEVSIN) 0.125 mg/5 mL Elix Take 0.125 mg by mouth every 4 (four) hours as needed.      lamotrigine XR (LAMICTAL XR) 300 mg XR tablet Take 150 mg by mouth every evening.      methocarbamoL (ROBAXIN) 750 MG Tab Take 1 tablet by mouth as needed.      mirtazapine (REMERON) 30 MG tablet Take 30 mg by mouth every evening.      needle, disp, 25 gauge 25  "gauge x 5/8" Ndle Use as directed to inject testosterone 100 each 2    omeprazole (PRILOSEC) 40 MG capsule Take 40 mg by mouth every morning.      ondansetron (ZOFRAN-ODT) 8 MG TbDL Take 8 mg by mouth 2 (two) times daily as needed.      syringe, disposable, 1 mL Syrg Use as directed to inject testosterone 50 Syringe 3    testosterone cypionate (DEPOTESTOTERONE CYPIONATE) 200 mg/mL injection Inject 0.4 mLs into the muscle once a week. TUESDAYS       No current facility-administered medications on file prior to visit.     Family History   Problem Relation Name Age of Onset    Breast cancer Maternal Aunt      Ovarian cancer Maternal Aunt      Congenital heart disease Maternal Uncle      Heart disease Maternal Grandfather      Colon cancer Neg Hx             Ohs Peq Odvv Intake    9/6/2024  7:58 PM CDT - Filed by Patient   What is your current physical address in the event of a medical emergency?    Are you able to take your vital signs? Yes   Systolic Blood Pressure: 120   Diastolic Blood Pressure: 80   Weight: 225   Height: 66   Pulse: 76   Temperature:    Respiration rate:    Pulse Oxygen: 98   Please attach any relevant images or files          23 yo male with c/o congestion, sore throat, headaches for one day. He states he was exposed to a virus at school. He denies fever he states worst symptoms sore throat and fatigue. He states skin more warm to touch than usual but has not been able to take temperature. He states slight cough.         Constitution: Negative.   HENT:  Positive for congestion and sore throat.    Cardiovascular: Negative.    Respiratory: Negative.     Gastrointestinal: Negative.    Endocrine: negative.   Genitourinary: Negative.  Negative for frequency and urgency.   Musculoskeletal:  Positive for muscle ache.   Skin: Negative.    Allergic/Immunologic: Negative.    Neurological:  Positive for headaches.   Hematologic/Lymphatic: Negative.    Psychiatric/Behavioral: Negative.          Objective: "   The physical exam was conducted virtually.  LOCATION OF PATIENT home  Physical Exam   Constitutional: He is oriented to person, place, and time. He appears well-developed.   HENT:   Head: Normocephalic and atraumatic.   Ears:   Right Ear: Hearing, tympanic membrane and external ear normal.   Left Ear: Hearing, tympanic membrane and external ear normal.   Nose: Nose normal.   Mouth/Throat: Uvula is midline, oropharynx is clear and moist and mucous membranes are normal.   Eyes: Conjunctivae and EOM are normal. Pupils are equal, round, and reactive to light.   Neck: Neck supple.   Cardiovascular: Normal rate.   Pulmonary/Chest: Effort normal and breath sounds normal.   Musculoskeletal: Normal range of motion.         General: Normal range of motion.   Neurological: He is alert and oriented to person, place, and time.   Skin: Skin is warm.   Psychiatric: His behavior is normal. Thought content normal.   Nursing note and vitals reviewed.      Assessment:     1. Sore throat    2. Cough, unspecified type    3. Nasal sinus congestion        Plan:     -Below are suggestions for symptomatic relief:              -Tylenol every 4 hours OR ibuprofen every 6 hours as needed for pain/fever.              -Salt water gargles to soothe throat pain.              -Chloroseptic spray also helps to numb throat pain.              -Nasal saline spray reduces inflammation and dryness.              -Warm face compresses to help with facial sinus pain/pressure.              -Vicks vapor rub at night.              -Flonase OTC or Nasacort OTC for nasal congestion.              -Simple foods like chicken noodle soup.              -Delsym helps with coughing at night              -Zyrtec/Claritin during the day & Benadryl at night may help with allergies.     If you DO NOT have Hypertension or any history of palpitations, it is ok to take over the counter Sudafed or Mucinex D or Allegra-D or Claritin-D or Zyrtec-D.  If you do take one of the  above, it is ok to combine that with plain over the counter Mucinex or Allegra or Claritin or Zyrtec. If, for example, you are taking Zyrtec -D, you can combine that with Mucinex, but not Mucinex-D.  If you are taking Mucinex-D, you can combine that with plain Allegra or Claritin or Zyrtec.   If you DO have Hypertension or palpitations, it is safe to take Coricidin HBP for relief of sinus symptoms.     Please follow up with your Primary care provider within 2-5 days if your signs and symptoms have not resolved or worsen.      If your condition worsens or fails to improve we recommend that you receive another evaluation at the emergency room immediately or contact your primary medical clinic to discuss your concerns.   You must understand that you have received an Urgent Care treatment only and that you may be released before all of your medical problems are known or treated. You, the patient, will arrange for follow up care as instructed.      RED FLAGS/WARNING SYMPTOMS DISCUSSED WITH PATIENT THAT WOULD WARRANT EMERGENT MEDICAL ATTENTION. PATIENT VERBALIZED UNDERSTANDING.     Sore throat    Cough, unspecified type    Nasal sinus congestion

## 2024-10-01 ENCOUNTER — HOSPITAL ENCOUNTER (EMERGENCY)
Facility: OTHER | Age: 24
Discharge: HOME | End: 2024-10-01
Attending: EMERGENCY MEDICINE
Payer: MEDICARE

## 2024-10-01 ENCOUNTER — NURSE TRIAGE (OUTPATIENT)
Dept: ADMINISTRATIVE | Facility: CLINIC | Age: 24
End: 2024-10-01
Payer: MEDICARE

## 2024-10-01 VITALS
TEMPERATURE: 98 F | WEIGHT: 225 LBS | RESPIRATION RATE: 19 BRPM | HEART RATE: 63 BPM | HEIGHT: 66 IN | BODY MASS INDEX: 36.16 KG/M2 | DIASTOLIC BLOOD PRESSURE: 77 MMHG | SYSTOLIC BLOOD PRESSURE: 123 MMHG | OXYGEN SATURATION: 99 %

## 2024-10-01 DIAGNOSIS — M25.512 ACUTE PAIN OF LEFT SHOULDER: Primary | ICD-10-CM

## 2024-10-01 LAB
B-HCG UR QL: NEGATIVE
CTP QC/QA: YES

## 2024-10-01 PROCEDURE — 25000003 PHARM REV CODE 250

## 2024-10-01 PROCEDURE — 99284 EMERGENCY DEPT VISIT MOD MDM: CPT | Mod: 25

## 2024-10-01 PROCEDURE — 96372 THER/PROPH/DIAG INJ SC/IM: CPT

## 2024-10-01 PROCEDURE — 63600175 PHARM REV CODE 636 W HCPCS

## 2024-10-01 PROCEDURE — 81025 URINE PREGNANCY TEST: CPT

## 2024-10-01 RX ORDER — KETOROLAC TROMETHAMINE 30 MG/ML
15 INJECTION, SOLUTION INTRAMUSCULAR; INTRAVENOUS
Status: COMPLETED | OUTPATIENT
Start: 2024-10-01 | End: 2024-10-01

## 2024-10-01 RX ORDER — METHOCARBAMOL 500 MG/1
500 TABLET, FILM COATED ORAL 3 TIMES DAILY PRN
Qty: 21 TABLET | Refills: 0 | Status: SHIPPED | OUTPATIENT
Start: 2024-10-01 | End: 2024-10-08

## 2024-10-01 RX ORDER — LIDOCAINE 50 MG/G
1 PATCH TOPICAL
Status: DISCONTINUED | OUTPATIENT
Start: 2024-10-01 | End: 2024-10-01 | Stop reason: HOSPADM

## 2024-10-01 RX ORDER — DICLOFENAC SODIUM 10 MG/G
2 GEL TOPICAL 2 TIMES DAILY PRN
Qty: 100 G | Refills: 0 | Status: SHIPPED | OUTPATIENT
Start: 2024-10-01 | End: 2024-10-06

## 2024-10-01 RX ORDER — ORPHENADRINE CITRATE 100 MG/1
100 TABLET, EXTENDED RELEASE ORAL
Status: COMPLETED | OUTPATIENT
Start: 2024-10-01 | End: 2024-10-01

## 2024-10-01 RX ORDER — LIDOCAINE 50 MG/G
1 PATCH TOPICAL DAILY
Qty: 5 PATCH | Refills: 0 | Status: SHIPPED | OUTPATIENT
Start: 2024-10-01 | End: 2024-10-06

## 2024-10-01 RX ADMIN — ORPHENADRINE CITRATE 100 MG: 100 TABLET, EXTENDED RELEASE ORAL at 10:10

## 2024-10-01 RX ADMIN — LIDOCAINE 1 PATCH: 50 PATCH CUTANEOUS at 10:10

## 2024-10-01 RX ADMIN — KETOROLAC TROMETHAMINE 15 MG: 30 INJECTION, SOLUTION INTRAMUSCULAR; INTRAVENOUS at 10:10

## 2024-10-01 NOTE — ED NOTES
Bed: 17 REC  Expected date:   Expected time:   Means of arrival: Personal Transportation  Comments:  
Calm

## 2024-10-01 NOTE — DISCHARGE INSTRUCTIONS
Continue Tylenol and Robaxin as needed for posterior shoulder pain. You can apply lidocaine patches or Voltaren gel to your areas of pain. Follow up with your pain mgmt doctor for persistent or worsening pain.

## 2024-10-01 NOTE — TELEPHONE ENCOUNTER
C/O severe excruiating upper back and shoulder pain.    Reason for Disposition   [1] SEVERE back pain (e.g., excruciating, unable to do any normal activities) AND [2] not improved 2 hours after pain medicine    Additional Information   Negative: Passed out (e.g., fainted, lost consciousness, blacked out and was not responding)   Negative: Shock suspected (e.g., cold/pale/clammy skin, too weak to stand, low BP, rapid pulse)   Negative: Sounds like a life-threatening emergency to the triager   Negative: Major injury to the back (e.g., MVA, fall > 10 feet or 3 meters, penetrating injury, etc.)   Negative: Followed a tailbone injury   Negative: [1] Pain in the upper back over the ribs (rib cage) AND [2] radiates (travels, goes) into chest   Negative: [1] Pain in the upper back over the ribs (rib cage) AND [2] worsened by coughing (or clearly increases with breathing)   Negative: Back pain during pregnancy   Negative: Pain mainly in flank (i.e., in the side, over the lower ribs or just below the ribs)   Negative: [1] SEVERE back pain (e.g., excruciating) AND [2] sudden onset AND [3] age > 60 years   Negative: [1] Unable to urinate (or only a few drops) > 4 hours AND [2] bladder feels very full (e.g., palpable bladder or strong urge to urinate)   Negative: [1] Loss of bladder or bowel control (urine or bowel incontinence; wetting self, leaking stool) AND [2] new-onset   Negative: Numbness in groin or rectal area (i.e., loss of sensation)   Negative: [1] SEVERE abdominal pain AND [2] present > 1 hour   Negative: [1] Abdominal pain AND [2] age > 60 years   Negative: Weakness of a leg or foot (e.g., unable to bear weight, dragging foot)   Negative: Unable to walk   Negative: Patient sounds very sick or weak to the triager    Protocols used: Back Pain-A-

## 2024-11-26 DIAGNOSIS — M54.6 THORACIC SPINE PAIN: Primary | ICD-10-CM

## 2024-11-29 ENCOUNTER — HOSPITAL ENCOUNTER (OUTPATIENT)
Dept: RADIOLOGY | Facility: OTHER | Age: 24
Discharge: HOME | End: 2024-11-29
Attending: STUDENT IN AN ORGANIZED HEALTH CARE EDUCATION/TRAINING PROGRAM
Payer: MEDICARE

## 2024-11-29 DIAGNOSIS — M54.6 THORACIC SPINE PAIN: ICD-10-CM

## 2024-11-29 PROCEDURE — 72146 MRI CHEST SPINE W/O DYE: CPT | Mod: 26,,, | Performed by: RADIOLOGY

## 2024-11-29 PROCEDURE — 72146 MRI CHEST SPINE W/O DYE: CPT | Mod: TC

## 2025-06-01 ENCOUNTER — OFFICE VISIT (OUTPATIENT)
Dept: URGENT CARE | Facility: CLINIC | Age: 25
End: 2025-06-01
Payer: MEDICARE

## 2025-06-01 VITALS
HEIGHT: 66 IN | WEIGHT: 235 LBS | TEMPERATURE: 98 F | BODY MASS INDEX: 37.77 KG/M2 | RESPIRATION RATE: 20 BRPM | OXYGEN SATURATION: 98 % | SYSTOLIC BLOOD PRESSURE: 118 MMHG | DIASTOLIC BLOOD PRESSURE: 71 MMHG | HEART RATE: 77 BPM

## 2025-06-01 DIAGNOSIS — R09.81 NASAL CONGESTION: ICD-10-CM

## 2025-06-01 DIAGNOSIS — R09.82 POST-NASAL DRIP: ICD-10-CM

## 2025-06-01 DIAGNOSIS — J06.9 VIRAL URI WITH COUGH: Primary | ICD-10-CM

## 2025-06-01 DIAGNOSIS — J02.9 SORE THROAT: ICD-10-CM

## 2025-06-01 PROBLEM — F31.9 BIPOLAR DISORDER: Status: ACTIVE | Noted: 2021-09-01

## 2025-06-01 LAB
CTP QC/QA: YES
CTP QC/QA: YES
MOLECULAR STREP A: NEGATIVE
SARS-COV+SARS-COV-2 AG RESP QL IA.RAPID: NEGATIVE

## 2025-06-01 PROCEDURE — 87811 SARS-COV-2 COVID19 W/OPTIC: CPT | Mod: QW,S$GLB,, | Performed by: NURSE PRACTITIONER

## 2025-06-01 PROCEDURE — 87651 STREP A DNA AMP PROBE: CPT | Mod: QW,S$GLB,, | Performed by: NURSE PRACTITIONER

## 2025-06-01 PROCEDURE — 99213 OFFICE O/P EST LOW 20 MIN: CPT | Mod: S$GLB,,, | Performed by: NURSE PRACTITIONER

## 2025-06-01 RX ORDER — LIDOCAINE 50 MG/G
1 PATCH TOPICAL
COMMUNITY
Start: 2025-02-21

## 2025-06-01 RX ORDER — BENZONATATE 100 MG/1
100 CAPSULE ORAL 3 TIMES DAILY PRN
Qty: 30 CAPSULE | Refills: 0 | Status: SHIPPED | OUTPATIENT
Start: 2025-06-01 | End: 2025-06-11

## 2025-06-01 RX ORDER — CYCLOBENZAPRINE HCL 10 MG
10 TABLET ORAL EVERY 12 HOURS PRN
COMMUNITY
Start: 2025-05-06

## 2025-06-01 RX ORDER — FLUTICASONE PROPIONATE 50 MCG
2 SPRAY, SUSPENSION (ML) NASAL DAILY PRN
Qty: 15.8 ML | Refills: 0 | Status: SHIPPED | OUTPATIENT
Start: 2025-06-01

## 2025-06-01 RX ORDER — LORATADINE 10 MG/1
10 TABLET ORAL DAILY PRN
Qty: 30 TABLET | Refills: 0 | Status: SHIPPED | OUTPATIENT
Start: 2025-06-01

## 2025-06-01 RX ORDER — CLONIDINE HYDROCHLORIDE 0.1 MG/1
0.1 TABLET ORAL
COMMUNITY
Start: 2025-05-07

## 2025-08-20 ENCOUNTER — OFFICE VISIT (OUTPATIENT)
Dept: URGENT CARE | Facility: CLINIC | Age: 25
End: 2025-08-20
Payer: MEDICARE

## 2025-08-20 VITALS
BODY MASS INDEX: 40.04 KG/M2 | TEMPERATURE: 98 F | WEIGHT: 249.13 LBS | RESPIRATION RATE: 17 BRPM | DIASTOLIC BLOOD PRESSURE: 71 MMHG | SYSTOLIC BLOOD PRESSURE: 109 MMHG | OXYGEN SATURATION: 98 % | HEIGHT: 66 IN | HEART RATE: 75 BPM

## 2025-08-20 DIAGNOSIS — U07.1 COVID: Primary | ICD-10-CM

## 2025-08-20 DIAGNOSIS — R05.9 COUGH, UNSPECIFIED TYPE: ICD-10-CM

## 2025-08-20 LAB
CTP QC/QA: YES
SARS-COV+SARS-COV-2 AG RESP QL IA.RAPID: POSITIVE

## 2025-08-20 PROCEDURE — 87811 SARS-COV-2 COVID19 W/OPTIC: CPT | Mod: QW,S$GLB,,

## 2025-08-20 PROCEDURE — 99214 OFFICE O/P EST MOD 30 MIN: CPT | Mod: S$GLB,,,

## 2025-08-20 RX ORDER — DEXTROAMPHETAMINE SACCHARATE, AMPHETAMINE ASPARTATE, DEXTROAMPHETAMINE SULFATE AND AMPHETAMINE SULFATE 2.5; 2.5; 2.5; 2.5 MG/1; MG/1; MG/1; MG/1
1 TABLET ORAL
COMMUNITY
Start: 2025-07-24

## 2025-08-20 RX ORDER — GABAPENTIN 400 MG/1
400 CAPSULE ORAL EVERY 8 HOURS
COMMUNITY
Start: 2025-08-11

## 2025-08-20 RX ORDER — DEXTROAMPHETAMINE SACCHARATE, AMPHETAMINE ASPARTATE, DEXTROAMPHETAMINE SULFATE AND AMPHETAMINE SULFATE 1.25; 1.25; 1.25; 1.25 MG/1; MG/1; MG/1; MG/1
TABLET ORAL
COMMUNITY
Start: 2024-05-15

## 2025-08-20 RX ORDER — MELOXICAM 7.5 MG/1
7.5 TABLET ORAL
COMMUNITY
Start: 2025-08-11

## 2025-08-20 RX ORDER — LAMOTRIGINE 150 MG/1
TABLET ORAL
COMMUNITY

## 2025-08-20 RX ORDER — DEXTROAMPHETAMINE SACCHARATE, AMPHETAMINE ASPARTATE MONOHYDRATE, DEXTROAMPHETAMINE SULFATE AND AMPHETAMINE SULFATE 5; 5; 5; 5 MG/1; MG/1; MG/1; MG/1
CAPSULE, EXTENDED RELEASE ORAL
COMMUNITY
Start: 2025-04-17

## (undated) DEVICE — DECANTER FLUID TRNSF WHITE 9IN

## (undated) DEVICE — BLADE MILL BONE MEDIUM

## (undated) DEVICE — Device

## (undated) DEVICE — DIFFUSER

## (undated) DEVICE — KIT SPINAL PATIENT CARE JACK

## (undated) DEVICE — BLANKET LOWER BODY 55.9X40.2IN

## (undated) DEVICE — DRAPE STERI-DRAPE 1000 17X11IN

## (undated) DEVICE — ELECTRODE REM PLYHSV RETURN 9

## (undated) DEVICE — DRESSING MEPILEX BORDER 4 X 4

## (undated) DEVICE — SCREW BONE SPINAL 5.5 6 X 50MM
Type: IMPLANTABLE DEVICE | Site: SPINE LUMBAR | Status: NON-FUNCTIONAL
Removed: 2023-08-29

## (undated) DEVICE — DRESSING AQUACEL SACRAL 9 X 9

## (undated) DEVICE — DRAPE STERI INSTRUMENT 1018

## (undated) DEVICE — DRESSING AQUACEL AG 3.5X10IN

## (undated) DEVICE — SOL NACL 0.45% 1000ML BG

## (undated) DEVICE — SUT VICRYL+ 1 CT1 18IN

## (undated) DEVICE — SYS PRINEO SKIN CLOSURE

## (undated) DEVICE — DURAPREP SURG SCRUB 26ML

## (undated) DEVICE — DRAPE C-ARM ELAS CLIP 42X120IN

## (undated) DEVICE — SOL IRR NACL .9% 3000ML

## (undated) DEVICE — SCREW BONE SPINAL 5.5 7 X 50MM
Type: IMPLANTABLE DEVICE | Site: SPINE LUMBAR | Status: NON-FUNCTIONAL
Removed: 2023-08-29

## (undated) DEVICE — KIT EVACUATOR 3-SPRING 1/8 DRN

## (undated) DEVICE — BUR BONE CUT MICRO TPS 3X3.8MM

## (undated) DEVICE — KIT IRR SUCTION HND PIECE

## (undated) DEVICE — DRESSING AQUACEL FOAM 3 X 3

## (undated) DEVICE — SUCTION FRAZIER TIP SURG 12FR

## (undated) DEVICE — MARKER SKIN STND TIP BLUE BARR

## (undated) DEVICE — TRAY NEURO OMC

## (undated) DEVICE — GOWN POLY REINF BRTH SLV XL

## (undated) DEVICE — DRESSING AQUACEL FOAM 5 X 5

## (undated) DEVICE — DRAPE C-ARMOR EQUIPMENT COVER

## (undated) DEVICE — DRESSING AQUACEL FOAM 4 X 12

## (undated) DEVICE — TRAY CATH FOL SIL URIMTR 16FR

## (undated) DEVICE — TRAY FOLEY 16FR INFECTION CONT

## (undated) DEVICE — DRAPE C ARM 42 X 120 10/BX

## (undated) DEVICE — DRAPE LAP T SHT W/ INSTR PAD

## (undated) DEVICE — SEE MEDLINE ITEM 157150

## (undated) DEVICE — SEE MEDLINE ITEM 156905

## (undated) DEVICE — BOVIE SUCTION

## (undated) DEVICE — DRAPE TOP 53X102IN

## (undated) DEVICE — TAP 8MM SPINAL POWER

## (undated) DEVICE — DRESSING TRANS 4X4 TEGADERM

## (undated) DEVICE — TAP 6MM SPINAL POWER

## (undated) DEVICE — NDL ECLIPSE SAFETY 18GX1-1/2IN

## (undated) DEVICE — SEALER AQUAMANTYS 2.3 BIPOLAR

## (undated) DEVICE — KIT SURGIFLO HEMOSTATIC MATRIX

## (undated) DEVICE — TAP EXPEDIUM 5.5X7MM 30-70MM

## (undated) DEVICE — DRESSING ABSRBNT ISLAND 3.6X8

## (undated) DEVICE — DRESSING TRANS 8X12 TEGADERM

## (undated) DEVICE — DRESSING AQUACEL FOAM NON 4X4

## (undated) DEVICE — SEE MEDLINE ITEM 157148

## (undated) DEVICE — SET DECANTER MEDICHOICE

## (undated) DEVICE — GOWN POLY REINF BRTH SLV LG